# Patient Record
Sex: FEMALE | Race: WHITE | NOT HISPANIC OR LATINO
[De-identification: names, ages, dates, MRNs, and addresses within clinical notes are randomized per-mention and may not be internally consistent; named-entity substitution may affect disease eponyms.]

---
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
## 2018-01-08 LAB — SURGICAL PATHOLOGY STUDY: SIGNIFICANT CHANGE UP

## 2018-01-19 ENCOUNTER — APPOINTMENT (OUTPATIENT)
Dept: DERMATOLOGY | Facility: CLINIC | Age: 18
End: 2018-01-19

## 2018-01-26 ENCOUNTER — APPOINTMENT (OUTPATIENT)
Dept: PLASTIC SURGERY | Facility: CLINIC | Age: 18
End: 2018-01-26
Payer: COMMERCIAL

## 2018-01-26 PROCEDURE — 99024 POSTOP FOLLOW-UP VISIT: CPT

## 2018-01-29 ENCOUNTER — TRANSCRIPTION ENCOUNTER (OUTPATIENT)
Age: 18
End: 2018-01-29

## 2018-02-09 ENCOUNTER — APPOINTMENT (OUTPATIENT)
Dept: PLASTIC SURGERY | Facility: CLINIC | Age: 18
End: 2018-02-09
Payer: COMMERCIAL

## 2018-02-09 ENCOUNTER — RESULT REVIEW (OUTPATIENT)
Age: 18
End: 2018-02-09

## 2018-02-09 PROCEDURE — 13101 CMPLX RPR TRUNK 2.6-7.5 CM: CPT | Mod: 78,59

## 2018-02-09 PROCEDURE — 21935 RESECT BACK TUM < 5 CM: CPT | Mod: 78

## 2018-02-13 ENCOUNTER — OUTPATIENT (OUTPATIENT)
Dept: OUTPATIENT SERVICES | Facility: HOSPITAL | Age: 18
LOS: 1 days | End: 2018-02-13
Payer: COMMERCIAL

## 2018-02-13 DIAGNOSIS — Z78.9 OTHER SPECIFIED HEALTH STATUS: Chronic | ICD-10-CM

## 2018-02-13 DIAGNOSIS — D36.10 BENIGN NEOPLASM OF PERIPHERAL NERVES AND AUTONOMIC NERVOUS SYSTEM, UNSPECIFIED: ICD-10-CM

## 2018-02-13 PROCEDURE — 88341 IMHCHEM/IMCYTCHM EA ADD ANTB: CPT

## 2018-02-13 PROCEDURE — 88305 TISSUE EXAM BY PATHOLOGIST: CPT

## 2018-02-16 ENCOUNTER — APPOINTMENT (OUTPATIENT)
Dept: PLASTIC SURGERY | Facility: CLINIC | Age: 18
End: 2018-02-16
Payer: COMMERCIAL

## 2018-02-16 PROCEDURE — 99024 POSTOP FOLLOW-UP VISIT: CPT

## 2018-02-21 LAB
SURGICAL PATHOLOGY STUDY: SIGNIFICANT CHANGE UP
SURGICAL PATHOLOGY STUDY: SIGNIFICANT CHANGE UP

## 2018-03-16 ENCOUNTER — APPOINTMENT (OUTPATIENT)
Dept: PLASTIC SURGERY | Facility: CLINIC | Age: 18
End: 2018-03-16
Payer: COMMERCIAL

## 2018-03-16 PROCEDURE — 99213 OFFICE O/P EST LOW 20 MIN: CPT | Mod: 24

## 2018-09-07 ENCOUNTER — APPOINTMENT (OUTPATIENT)
Dept: DERMATOLOGY | Facility: CLINIC | Age: 18
End: 2018-09-07
Payer: COMMERCIAL

## 2018-09-07 VITALS — WEIGHT: 118 LBS | DIASTOLIC BLOOD PRESSURE: 66 MMHG | SYSTOLIC BLOOD PRESSURE: 104 MMHG

## 2018-09-07 DIAGNOSIS — Z92.3 PERSONAL HISTORY OF IRRADIATION: ICD-10-CM

## 2018-09-07 PROCEDURE — 99214 OFFICE O/P EST MOD 30 MIN: CPT | Mod: GC

## 2018-09-17 ENCOUNTER — APPOINTMENT (OUTPATIENT)
Dept: MRI IMAGING | Facility: IMAGING CENTER | Age: 18
End: 2018-09-17
Payer: COMMERCIAL

## 2018-09-17 ENCOUNTER — OUTPATIENT (OUTPATIENT)
Dept: OUTPATIENT SERVICES | Facility: HOSPITAL | Age: 18
LOS: 1 days | End: 2018-09-17
Payer: COMMERCIAL

## 2018-09-17 DIAGNOSIS — Z78.9 OTHER SPECIFIED HEALTH STATUS: Chronic | ICD-10-CM

## 2018-09-17 DIAGNOSIS — C72.30 MALIGNANT NEOPLASM OF UNSPECIFIED OPTIC NERVE: ICD-10-CM

## 2018-09-17 PROCEDURE — 70553 MRI BRAIN STEM W/O & W/DYE: CPT

## 2018-09-17 PROCEDURE — 70543 MRI ORBT/FAC/NCK W/O &W/DYE: CPT

## 2018-09-17 PROCEDURE — 70543 MRI ORBT/FAC/NCK W/O &W/DYE: CPT | Mod: 26

## 2018-09-17 PROCEDURE — 82565 ASSAY OF CREATININE: CPT

## 2018-09-17 PROCEDURE — 70553 MRI BRAIN STEM W/O & W/DYE: CPT | Mod: 26

## 2018-09-17 PROCEDURE — A9585: CPT

## 2018-09-25 ENCOUNTER — OUTPATIENT (OUTPATIENT)
Dept: OUTPATIENT SERVICES | Facility: HOSPITAL | Age: 18
LOS: 1 days | End: 2018-09-25
Payer: COMMERCIAL

## 2018-09-25 ENCOUNTER — APPOINTMENT (OUTPATIENT)
Dept: MRI IMAGING | Facility: IMAGING CENTER | Age: 18
End: 2018-09-25
Payer: COMMERCIAL

## 2018-09-25 DIAGNOSIS — Q85.01 NEUROFIBROMATOSIS, TYPE 1: ICD-10-CM

## 2018-09-25 DIAGNOSIS — Z78.9 OTHER SPECIFIED HEALTH STATUS: Chronic | ICD-10-CM

## 2018-09-25 PROCEDURE — 74183 MRI ABD W/O CNTR FLWD CNTR: CPT

## 2018-09-25 PROCEDURE — 82565 ASSAY OF CREATININE: CPT

## 2018-09-25 PROCEDURE — 72197 MRI PELVIS W/O & W/DYE: CPT

## 2018-09-25 PROCEDURE — 74183 MRI ABD W/O CNTR FLWD CNTR: CPT | Mod: 26

## 2018-09-25 PROCEDURE — 72197 MRI PELVIS W/O & W/DYE: CPT | Mod: 26

## 2018-09-25 PROCEDURE — A9585: CPT

## 2018-09-26 ENCOUNTER — APPOINTMENT (OUTPATIENT)
Dept: PEDIATRIC NEUROLOGY | Facility: CLINIC | Age: 18
End: 2018-09-26
Payer: COMMERCIAL

## 2018-09-26 VITALS — SYSTOLIC BLOOD PRESSURE: 89 MMHG | DIASTOLIC BLOOD PRESSURE: 62 MMHG | HEART RATE: 84 BPM | WEIGHT: 114 LBS

## 2018-09-26 DIAGNOSIS — Z86.39 PERSONAL HISTORY OF OTHER ENDOCRINE, NUTRITIONAL AND METABOLIC DISEASE: ICD-10-CM

## 2018-09-26 PROCEDURE — 99214 OFFICE O/P EST MOD 30 MIN: CPT

## 2018-10-01 ENCOUNTER — RESULT REVIEW (OUTPATIENT)
Age: 18
End: 2018-10-01

## 2018-10-03 ENCOUNTER — RESULT REVIEW (OUTPATIENT)
Age: 18
End: 2018-10-03

## 2018-11-09 ENCOUNTER — APPOINTMENT (OUTPATIENT)
Dept: MRI IMAGING | Facility: IMAGING CENTER | Age: 18
End: 2018-11-09
Payer: COMMERCIAL

## 2018-11-09 ENCOUNTER — OUTPATIENT (OUTPATIENT)
Dept: OUTPATIENT SERVICES | Facility: HOSPITAL | Age: 18
LOS: 1 days | End: 2018-11-09
Payer: COMMERCIAL

## 2018-11-09 DIAGNOSIS — Z78.9 OTHER SPECIFIED HEALTH STATUS: Chronic | ICD-10-CM

## 2018-11-09 DIAGNOSIS — Q85.00 NEUROFIBROMATOSIS, UNSPECIFIED: ICD-10-CM

## 2018-11-09 PROCEDURE — A9585: CPT

## 2018-11-09 PROCEDURE — 72157 MRI CHEST SPINE W/O & W/DYE: CPT | Mod: 26

## 2018-11-09 PROCEDURE — 82565 ASSAY OF CREATININE: CPT

## 2018-11-09 PROCEDURE — 72158 MRI LUMBAR SPINE W/O & W/DYE: CPT | Mod: 26

## 2018-11-09 PROCEDURE — 72157 MRI CHEST SPINE W/O & W/DYE: CPT

## 2018-11-09 PROCEDURE — 72158 MRI LUMBAR SPINE W/O & W/DYE: CPT

## 2018-11-20 ENCOUNTER — APPOINTMENT (OUTPATIENT)
Dept: PEDIATRIC HEMATOLOGY/ONCOLOGY | Facility: CLINIC | Age: 18
End: 2018-11-20

## 2018-12-03 ENCOUNTER — OUTPATIENT (OUTPATIENT)
Dept: OUTPATIENT SERVICES | Age: 18
LOS: 1 days | End: 2018-12-03

## 2018-12-03 ENCOUNTER — APPOINTMENT (OUTPATIENT)
Dept: MRI IMAGING | Facility: HOSPITAL | Age: 18
End: 2018-12-03
Payer: COMMERCIAL

## 2018-12-03 DIAGNOSIS — Z78.9 OTHER SPECIFIED HEALTH STATUS: Chronic | ICD-10-CM

## 2018-12-03 DIAGNOSIS — D36.10 BENIGN NEOPLASM OF PERIPHERAL NERVES AND AUTONOMIC NERVOUS SYSTEM, UNSPECIFIED: ICD-10-CM

## 2018-12-03 PROCEDURE — 72157 MRI CHEST SPINE W/O & W/DYE: CPT | Mod: 26

## 2018-12-07 ENCOUNTER — APPOINTMENT (OUTPATIENT)
Dept: DERMATOLOGY | Facility: CLINIC | Age: 18
End: 2018-12-07
Payer: COMMERCIAL

## 2018-12-07 VITALS
BODY MASS INDEX: 19.88 KG/M2 | SYSTOLIC BLOOD PRESSURE: 109 MMHG | DIASTOLIC BLOOD PRESSURE: 71 MMHG | WEIGHT: 108 LBS | HEIGHT: 62 IN

## 2018-12-07 PROCEDURE — 99213 OFFICE O/P EST LOW 20 MIN: CPT | Mod: GC

## 2018-12-17 ENCOUNTER — MOBILE ON CALL (OUTPATIENT)
Age: 18
End: 2018-12-17

## 2018-12-28 ENCOUNTER — APPOINTMENT (OUTPATIENT)
Dept: HEPATOLOGY | Facility: CLINIC | Age: 18
End: 2018-12-28
Payer: COMMERCIAL

## 2018-12-28 VITALS
HEIGHT: 62 IN | HEART RATE: 55 BPM | DIASTOLIC BLOOD PRESSURE: 42 MMHG | OXYGEN SATURATION: 97 % | RESPIRATION RATE: 14 BRPM | SYSTOLIC BLOOD PRESSURE: 96 MMHG | BODY MASS INDEX: 19.88 KG/M2 | WEIGHT: 108 LBS

## 2018-12-28 PROCEDURE — 99204 OFFICE O/P NEW MOD 45 MIN: CPT

## 2019-01-02 LAB
AFP-TM SERPL-MCNC: 1.8 NG/ML
ALBUMIN SERPL ELPH-MCNC: 5 G/DL
ALP BLD-CCNC: 112 U/L
ALT SERPL-CCNC: 23 U/L
ANION GAP SERPL CALC-SCNC: 14 MMOL/L
AST SERPL-CCNC: 21 U/L
BASOPHILS # BLD AUTO: 0.02 K/UL
BASOPHILS NFR BLD AUTO: 0.3 %
BILIRUB SERPL-MCNC: 0.8 MG/DL
BUN SERPL-MCNC: 23 MG/DL
CALCIUM SERPL-MCNC: 9.7 MG/DL
CANCER AG19-9 SERPL-ACNC: 8.2 U/ML
CERULOPLASMIN SERPL-MCNC: 22 MG/DL
CHLORIDE SERPL-SCNC: 104 MMOL/L
CO2 SERPL-SCNC: 23 MMOL/L
CREAT SERPL-MCNC: 0.95 MG/DL
DEPRECATED KAPPA LC FREE/LAMBDA SER: 1.25 RATIO
EOSINOPHIL # BLD AUTO: 0.03 K/UL
EOSINOPHIL NFR BLD AUTO: 0.5 %
FERRITIN SERPL-MCNC: 92 NG/ML
GLUCOSE SERPL-MCNC: 80 MG/DL
HBV SURFACE AB SER QL: NONREACTIVE
HBV SURFACE AG SER QL: NONREACTIVE
HCT VFR BLD CALC: 40.8 %
HCV AB SER QL: NONREACTIVE
HCV S/CO RATIO: 0.17 S/CO
HEPATITIS A IGG ANTIBODY: REACTIVE
HGB BLD-MCNC: 13.8 G/DL
IGA SER QL IEP: 252 MG/DL
IGG SER QL IEP: 1169 MG/DL
IGM SER QL IEP: 56 MG/DL
IMM GRANULOCYTES NFR BLD AUTO: 0.2 %
INR PPP: 1.13 RATIO
IRON SATN MFR SERPL: 37 %
IRON SERPL-MCNC: 106 UG/DL
KAPPA LC CSF-MCNC: 1.58 MG/DL
KAPPA LC SERPL-MCNC: 1.98 MG/DL
LYMPHOCYTES # BLD AUTO: 1.49 K/UL
LYMPHOCYTES NFR BLD AUTO: 24.8 %
MAN DIFF?: NORMAL
MCHC RBC-ENTMCNC: 32.2 PG
MCHC RBC-ENTMCNC: 33.8 GM/DL
MCV RBC AUTO: 95.3 FL
MONOCYTES # BLD AUTO: 0.45 K/UL
MONOCYTES NFR BLD AUTO: 7.5 %
NEUTROPHILS # BLD AUTO: 4 K/UL
NEUTROPHILS NFR BLD AUTO: 66.7 %
PLATELET # BLD AUTO: 263 K/UL
POTASSIUM SERPL-SCNC: 4.2 MMOL/L
PROT SERPL-MCNC: 7.7 G/DL
PT BLD: 12.7 SEC
RBC # BLD: 4.28 M/UL
RBC # FLD: 12.6 %
SMOOTH MUSCLE AB SER QL IF: NORMAL
SODIUM SERPL-SCNC: 141 MMOL/L
T4 FREE SERPL-MCNC: 1.7 NG/DL
TIBC SERPL-MCNC: 285 UG/DL
TSH SERPL-ACNC: 1.35 UIU/ML
UIBC SERPL-MCNC: 179 UG/DL
WBC # FLD AUTO: 6 K/UL

## 2019-01-03 LAB
A1AT PHENOTYP SERPL-IMP: NORMAL BANDS
A1AT SERPL-MCNC: 118 MG/DL
ANA PAT FLD IF-IMP: ABNORMAL
ANA SER IF-ACNC: ABNORMAL

## 2019-01-10 ENCOUNTER — MOBILE ON CALL (OUTPATIENT)
Age: 19
End: 2019-01-10

## 2019-01-22 ENCOUNTER — FORM ENCOUNTER (OUTPATIENT)
Age: 19
End: 2019-01-22

## 2019-01-23 ENCOUNTER — OUTPATIENT (OUTPATIENT)
Dept: OUTPATIENT SERVICES | Facility: HOSPITAL | Age: 19
LOS: 1 days | End: 2019-01-23
Payer: COMMERCIAL

## 2019-01-23 ENCOUNTER — APPOINTMENT (OUTPATIENT)
Dept: MRI IMAGING | Facility: IMAGING CENTER | Age: 19
End: 2019-01-23
Payer: COMMERCIAL

## 2019-01-23 DIAGNOSIS — Z78.9 OTHER SPECIFIED HEALTH STATUS: Chronic | ICD-10-CM

## 2019-01-23 DIAGNOSIS — K76.9 LIVER DISEASE, UNSPECIFIED: ICD-10-CM

## 2019-01-23 PROCEDURE — 74183 MRI ABD W/O CNTR FLWD CNTR: CPT

## 2019-01-23 PROCEDURE — 74183 MRI ABD W/O CNTR FLWD CNTR: CPT | Mod: 26

## 2019-02-08 ENCOUNTER — OTHER (OUTPATIENT)
Age: 19
End: 2019-02-08

## 2019-02-11 ENCOUNTER — OTHER (OUTPATIENT)
Age: 19
End: 2019-02-11

## 2019-02-12 ENCOUNTER — OTHER (OUTPATIENT)
Age: 19
End: 2019-02-12

## 2019-02-21 ENCOUNTER — APPOINTMENT (OUTPATIENT)
Dept: INTERVENTIONAL RADIOLOGY/VASCULAR | Facility: CLINIC | Age: 19
End: 2019-02-21

## 2019-02-21 ENCOUNTER — FORM ENCOUNTER (OUTPATIENT)
Age: 19
End: 2019-02-21

## 2019-02-22 ENCOUNTER — OUTPATIENT (OUTPATIENT)
Dept: OUTPATIENT SERVICES | Facility: HOSPITAL | Age: 19
LOS: 1 days | End: 2019-02-22
Payer: COMMERCIAL

## 2019-02-22 ENCOUNTER — APPOINTMENT (OUTPATIENT)
Dept: INTERVENTIONAL RADIOLOGY/VASCULAR | Facility: CLINIC | Age: 19
End: 2019-02-22
Payer: COMMERCIAL

## 2019-02-22 ENCOUNTER — APPOINTMENT (OUTPATIENT)
Dept: ULTRASOUND IMAGING | Facility: CLINIC | Age: 19
End: 2019-02-22
Payer: COMMERCIAL

## 2019-02-22 VITALS
HEIGHT: 62 IN | WEIGHT: 108 LBS | OXYGEN SATURATION: 99 % | RESPIRATION RATE: 16 BRPM | SYSTOLIC BLOOD PRESSURE: 96 MMHG | BODY MASS INDEX: 19.88 KG/M2 | HEART RATE: 97 BPM | TEMPERATURE: 98 F | DIASTOLIC BLOOD PRESSURE: 64 MMHG

## 2019-02-22 DIAGNOSIS — Z78.9 OTHER SPECIFIED HEALTH STATUS: Chronic | ICD-10-CM

## 2019-02-22 DIAGNOSIS — C47.9 MALIGNANT NEOPLASM OF PERIPHERAL NERVES AND AUTONOMIC NERVOUS SYSTEM, UNSPECIFIED: ICD-10-CM

## 2019-02-22 DIAGNOSIS — K76.9 LIVER DISEASE, UNSPECIFIED: ICD-10-CM

## 2019-02-22 DIAGNOSIS — Q85.01 NEUROFIBROMATOSIS, TYPE 1: ICD-10-CM

## 2019-02-22 DIAGNOSIS — Z78.9 OTHER SPECIFIED HEALTH STATUS: ICD-10-CM

## 2019-02-22 PROCEDURE — 76705 ECHO EXAM OF ABDOMEN: CPT | Mod: 26

## 2019-02-22 PROCEDURE — 76705 ECHO EXAM OF ABDOMEN: CPT

## 2019-02-22 PROCEDURE — 99243 OFF/OP CNSLTJ NEW/EST LOW 30: CPT

## 2019-02-22 RX ORDER — CHROMIUM 200 MCG
1000 TABLET ORAL
Refills: 0 | Status: DISCONTINUED | COMMUNITY
End: 2019-02-22

## 2019-02-22 RX ORDER — FEXOFENADINE HCL 60 MG
CAPSULE ORAL
Refills: 0 | Status: DISCONTINUED | COMMUNITY
End: 2019-02-22

## 2019-02-26 LAB
ALBUMIN SERPL ELPH-MCNC: 4.6 G/DL
ALP BLD-CCNC: 104 U/L
ALT SERPL-CCNC: 18 U/L
ANION GAP SERPL CALC-SCNC: 13 MMOL/L
AST SERPL-CCNC: 21 U/L
BASOPHILS # BLD AUTO: 0.05 K/UL
BASOPHILS NFR BLD AUTO: 0.5 %
BILIRUB SERPL-MCNC: 0.4 MG/DL
BUN SERPL-MCNC: 23 MG/DL
CALCIUM SERPL-MCNC: 9.4 MG/DL
CHLORIDE SERPL-SCNC: 105 MMOL/L
CO2 SERPL-SCNC: 23 MMOL/L
CREAT SERPL-MCNC: 0.96 MG/DL
EOSINOPHIL # BLD AUTO: 0.06 K/UL
EOSINOPHIL NFR BLD AUTO: 0.6 %
GLUCOSE SERPL-MCNC: 74 MG/DL
HCT VFR BLD CALC: 39.6 %
HGB BLD-MCNC: 13.2 G/DL
IMM GRANULOCYTES NFR BLD AUTO: 0.3 %
INR PPP: 1.15 RATIO
LYMPHOCYTES # BLD AUTO: 1.32 K/UL
LYMPHOCYTES NFR BLD AUTO: 13.4 %
MAN DIFF?: NORMAL
MCHC RBC-ENTMCNC: 32.7 PG
MCHC RBC-ENTMCNC: 33.3 GM/DL
MCV RBC AUTO: 98 FL
MONOCYTES # BLD AUTO: 0.9 K/UL
MONOCYTES NFR BLD AUTO: 9.2 %
NEUTROPHILS # BLD AUTO: 7.46 K/UL
NEUTROPHILS NFR BLD AUTO: 76 %
PLATELET # BLD AUTO: 251 K/UL
POTASSIUM SERPL-SCNC: 3.9 MMOL/L
PROT SERPL-MCNC: 7.7 G/DL
PT BLD: 13 SEC
RBC # BLD: 4.04 M/UL
RBC # FLD: 12.1 %
SODIUM SERPL-SCNC: 141 MMOL/L
WBC # FLD AUTO: 9.82 K/UL

## 2019-02-28 PROBLEM — Z78.9 DOES NOT USE ILLICIT DRUGS: Status: ACTIVE | Noted: 2019-02-28

## 2019-03-01 ENCOUNTER — TRANSCRIPTION ENCOUNTER (OUTPATIENT)
Age: 19
End: 2019-03-01

## 2019-03-01 NOTE — ASSESSMENT
[Other: _____] : [unfilled] [FreeTextEntry1] : Ms. 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 Her MRI from 1/23/19 demonstrates liver with normal morphology. There are 3 focal lesions including a 1.8 cm T1 hypointense, T2 mildly hyperintense lesion in the caudate lobe, which demonstrates arterial enhancement, equilibrates to background liver on delayed phase imaging, with suggestion of internal fat, a 1.0 cm T1 hypointense, T2 mildly hyperintense lesion in segment 5/6 demonstrates arterial enhancement, venous washout, and equilibrates to background liver on the late phase imaging, and a left hepatic dome lesion measures 1.2 x 1, previously 1.6 x 1.3. \par \par I reviewed her imaging and discussed the findings with Ms. Leung and her mother.  There are 2 indeterminate lesions that I could biopsy.  I would like to have her get an u/s of the liver to determine if the more superficial lesion is accessible to biopsy.  I have given her a rx and arranged for her to have the u/s performed today at a nearby imaging facility.  I discussed targeted liver biopsy procedure, including the risks, benefits, alternatives, and expected post procedure course.  I have given her a rx for lab work to be done today too.  She will have a T+S on the day of the procedure. \par \par I have provided the patient the opportunity to ask questions and have answered them to their satisfaction.  They are encouraged to contact our office with any further questions, concerns, or issues.  I will be in touch with her after I review her ultrasound. \par

## 2019-03-01 NOTE — HISTORY OF PRESENT ILLNESS
[FreeTextEntry1] : This is a 20 y/o female with pmhx of neurofibromatosis type 1 with multiple neurofibromas who presents today for consultation for liver biopsy.  She was diagnosed with NF1 at age 1 and was followed closely with serial imaging.  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  She has continued to follow with the pediatric team at [facility] and was referred to hepatology after MRI in 9/2018 demonstrated multiple hepatic lesions.  \par \par She was evaluated by Dr. Lucas and follow up MRI demonstrated 2 lesions of indeterminate nature. Her case was discussed at 2/4/19 Tumor Board and given her prior history of malignancy,  it was recommended she have a biopsy of the right lobe lesion.  She presents today to IR to discuss targeted liver biopsy. Reports unintentional weight loss of 18 lbs since September, despite having a good appetite.  Denies abdominal pain, fever, chills, change in skin color, or melena.

## 2019-03-01 NOTE — REVIEW OF SYSTEMS
[Recent Weight Loss (___ Lbs)] : recent [unfilled] ~Ulb weight loss [Negative] : Heme/Lymph [As Noted in HPI] : as noted in HPI [Fever] : no fever [Chills] : no chills [Feeling Poorly] : not feeling poorly [Feeling Tired] : not feeling tired

## 2019-03-01 NOTE — PHYSICAL EXAM
[Alert] : alert [No Acute Distress] : no acute distress [Well Developed] : well developed [Normal Voice/Communication] : normal voice communication [Normal Sclera/Conjunctiva] : normal sclera/conjunctiva [No Respiratory Distress] : no respiratory distress [Normal Rate and Effort] : normal respiratory rhythm and effort [No Accessory Muscle Use] : no accessory muscle use [Normal Gait] : normal gait [Oriented x3] : oriented to person, place, and time [Normal Insight/Judgement] : insight and judgment were intact [Normal Affect] : the affect was normal [Normal Mood] : the mood was normal [Recent Memory Normal] : recent memory was not impaired [Remote Memory Normal] : remote memory was not impaired [Restricted in physically strenuous activity but ambulatory and able to carry out work of a light or sedentary nature] : Restricted in physically strenuous activity but ambulatory and able to carry out work of a light or sedentary nature, e.g., light house work, office work [de-identified] : thin

## 2019-03-01 NOTE — DATA REVIEWED
[FreeTextEntry1] : \par \par EXAM: MR ABDOMEN WAW IC \par \par \par PROCEDURE DATE: 01/23/2019 \par \par \par \par INTERPRETATION: CLINICAL INFORMATION: Follow-up indeterminate hepatic \par lesions. 18-year-old female with neurofibromatosis type I. \par \par COMPARISON: MR abdomen 9/25/2018, 4/27/2016. \par \par PROCEDURE: \par MRI of the abdomen was performed with and without intravenous contrast. \par 10 cc of ProHance administered, 0 cc discarded. \par MRCP was performed. \par \par \par FINDINGS: \par \par LOWER CHEST: Within normal limits. \par \par LIVER: Normal morphology. \par \par 3 focal lesions identified as follows: \par \par A 1.8 cm T1 hypointense, T2 mildly hyperintense lesion in the caudate lobe \par (10:18) demonstrates arterial enhancement, equilibrates to background liver \par on delayed phase imaging, with suggestion of internal fat. This is not \par significantly changed from 9/25/2018. \par \par A 1.0 cm T1 hypointense, T2 mildly hyperintense lesion in segment 5/6 \par (10:23) demonstrates arterial enhancement, venous washout, and equilibrates \par to background liver on the late phase imaging. This is not significantly \par changed from 9/25/2018. \par \par Previously noted lesion in the left hepatic dome is less conspicuous than \par the other lesions but is slightly T2 hyperintense. This measures 1.2 x 1.1 \par cm (4, 8), compared with prior measurement of 1.6 x 1.3. \par \par BILE DUCTS: Normal caliber. \par GALLBLADDER: Within normal limits. \par SPLEEN: Within normal limits. \par PANCREAS: Within normal limits. \par ADRENALS: Right adrenalectomy. The left adrenal gland is within normal \par limits. \par KIDNEYS/URETERS: Right nephrectomy. The left kidney is within normal limits. \par \par VISUALIZED PORTIONS: \par \par BOWEL: No bowel obstruction. \par PERITONEUM: No ascites. \par VESSELS: Within normal limits. \par RETROPERITONEUM: Unchanged multiple neurofibromas as follows: \par A 3.5 x 3.0 cm left para-aortic neurofibroma (16:47). \par A 2.2 x 2.0 cm neurofibroma associated with the left paraspinal muscle \par (16:52). \par A 1.3 x 1.1 cm left perineural neurofibroma adjacent to the left psoas \par muscle (16:50). \par A right perineural neurofibroma measuring 2.5 x 1.1 cm (16:11) . \par \par ABDOMINAL WALL: Several neurofibromas associated with the right lateral \par abdominal wall and on the skin are also unchanged. \par \par \par IMPRESSION: \par Unchanged hepatic lesions as above, favoring adenomas. Follow-up MRI in 6 \par months is recommended. \par \par Unchanged multiple neurofibromas. \par \par \par \par \par \par \par KEHINDE SANTIAGO M.D., RADIOLOGY FELLOW \par This document has been electronically signed. \par GREYSON RICKS M.D., ATTENDING RADIOLOGIST

## 2019-03-01 NOTE — CONSULT LETTER
[Dear  ___] : Dear  [unfilled], [Courtesy Letter:] : I had the pleasure of seeing your patient, [unfilled], in my office today. [Please see my note below.] : Please see my note below. [Sincerely,] : Sincerely, [FreeTextEntry3] : Basil Skinner MD

## 2019-03-04 ENCOUNTER — APPOINTMENT (OUTPATIENT)
Dept: MRI IMAGING | Facility: HOSPITAL | Age: 19
End: 2019-03-04
Payer: COMMERCIAL

## 2019-03-07 ENCOUNTER — APPOINTMENT (OUTPATIENT)
Dept: MRI IMAGING | Facility: HOSPITAL | Age: 19
End: 2019-03-07
Payer: COMMERCIAL

## 2019-03-07 ENCOUNTER — OUTPATIENT (OUTPATIENT)
Dept: OUTPATIENT SERVICES | Age: 19
LOS: 1 days | End: 2019-03-07

## 2019-03-07 DIAGNOSIS — Z78.9 OTHER SPECIFIED HEALTH STATUS: Chronic | ICD-10-CM

## 2019-03-07 DIAGNOSIS — Q85.00 NEUROFIBROMATOSIS, UNSPECIFIED: ICD-10-CM

## 2019-03-08 PROCEDURE — 72157 MRI CHEST SPINE W/O & W/DYE: CPT | Mod: 26

## 2019-03-13 ENCOUNTER — OUTPATIENT (OUTPATIENT)
Dept: OUTPATIENT SERVICES | Facility: HOSPITAL | Age: 19
LOS: 1 days | End: 2019-03-13
Payer: COMMERCIAL

## 2019-03-13 ENCOUNTER — RESULT REVIEW (OUTPATIENT)
Age: 19
End: 2019-03-13

## 2019-03-13 ENCOUNTER — APPOINTMENT (OUTPATIENT)
Dept: CT IMAGING | Facility: HOSPITAL | Age: 19
End: 2019-03-13

## 2019-03-13 VITALS
HEART RATE: 88 BPM | WEIGHT: 108.03 LBS | TEMPERATURE: 98 F | HEIGHT: 62 IN | SYSTOLIC BLOOD PRESSURE: 118 MMHG | OXYGEN SATURATION: 100 % | RESPIRATION RATE: 14 BRPM | DIASTOLIC BLOOD PRESSURE: 76 MMHG

## 2019-03-13 VITALS — OXYGEN SATURATION: 100 %

## 2019-03-13 DIAGNOSIS — Z78.9 OTHER SPECIFIED HEALTH STATUS: Chronic | ICD-10-CM

## 2019-03-13 DIAGNOSIS — C47.9 MALIGNANT NEOPLASM OF PERIPHERAL NERVES AND AUTONOMIC NERVOUS SYSTEM, UNSPECIFIED: ICD-10-CM

## 2019-03-13 LAB
BLD GP AB SCN SERPL QL: NEGATIVE — SIGNIFICANT CHANGE UP
RH IG SCN BLD-IMP: POSITIVE — SIGNIFICANT CHANGE UP
RH IG SCN BLD-IMP: POSITIVE — SIGNIFICANT CHANGE UP

## 2019-03-13 PROCEDURE — 88313 SPECIAL STAINS GROUP 2: CPT | Mod: 26

## 2019-03-13 PROCEDURE — 77012 CT SCAN FOR NEEDLE BIOPSY: CPT | Mod: 26

## 2019-03-13 PROCEDURE — 47000 NEEDLE BIOPSY OF LIVER PERQ: CPT

## 2019-03-13 PROCEDURE — 88305 TISSUE EXAM BY PATHOLOGIST: CPT

## 2019-03-13 PROCEDURE — 86901 BLOOD TYPING SEROLOGIC RH(D): CPT

## 2019-03-13 PROCEDURE — 88313 SPECIAL STAINS GROUP 2: CPT

## 2019-03-13 PROCEDURE — 88173 CYTOPATH EVAL FNA REPORT: CPT

## 2019-03-13 PROCEDURE — 88173 CYTOPATH EVAL FNA REPORT: CPT | Mod: 26

## 2019-03-13 PROCEDURE — 88307 TISSUE EXAM BY PATHOLOGIST: CPT

## 2019-03-13 PROCEDURE — 88307 TISSUE EXAM BY PATHOLOGIST: CPT | Mod: 26

## 2019-03-13 PROCEDURE — 88305 TISSUE EXAM BY PATHOLOGIST: CPT | Mod: 26

## 2019-03-13 PROCEDURE — 88172 CYTP DX EVAL FNA 1ST EA SITE: CPT

## 2019-03-13 PROCEDURE — 86900 BLOOD TYPING SEROLOGIC ABO: CPT

## 2019-03-13 PROCEDURE — 86850 RBC ANTIBODY SCREEN: CPT

## 2019-03-13 PROCEDURE — 77012 CT SCAN FOR NEEDLE BIOPSY: CPT

## 2019-03-13 NOTE — PRE-ANESTHESIA EVALUATION ADULT - NSANTHOSAYNRD_GEN_A_CORE
No. GENIA screening performed.  STOP BANG Legend: 0-2 = LOW Risk; 3-4 = INTERMEDIATE Risk; 5-8 = HIGH Risk
No. GENIA screening performed.  STOP BANG Legend: 0-2 = LOW Risk; 3-4 = INTERMEDIATE Risk; 5-8 = HIGH Risk

## 2019-03-15 ENCOUNTER — EMERGENCY (EMERGENCY)
Age: 19
LOS: 1 days | Discharge: HOME | End: 2019-03-15
Attending: PEDIATRICS | Admitting: STUDENT IN AN ORGANIZED HEALTH CARE EDUCATION/TRAINING PROGRAM
Payer: COMMERCIAL

## 2019-03-15 ENCOUNTER — APPOINTMENT (OUTPATIENT)
Dept: DERMATOLOGY | Facility: CLINIC | Age: 19
End: 2019-03-15
Payer: COMMERCIAL

## 2019-03-15 VITALS
RESPIRATION RATE: 16 BRPM | OXYGEN SATURATION: 100 % | SYSTOLIC BLOOD PRESSURE: 104 MMHG | HEART RATE: 89 BPM | TEMPERATURE: 98 F | DIASTOLIC BLOOD PRESSURE: 57 MMHG

## 2019-03-15 VITALS — DIASTOLIC BLOOD PRESSURE: 62 MMHG | SYSTOLIC BLOOD PRESSURE: 96 MMHG | WEIGHT: 106 LBS

## 2019-03-15 DIAGNOSIS — D22.9 MELANOCYTIC NEVI, UNSPECIFIED: ICD-10-CM

## 2019-03-15 DIAGNOSIS — R51 HEADACHE: ICD-10-CM

## 2019-03-15 DIAGNOSIS — Z78.9 OTHER SPECIFIED HEALTH STATUS: Chronic | ICD-10-CM

## 2019-03-15 LAB
ALBUMIN SERPL ELPH-MCNC: 4.5 G/DL — SIGNIFICANT CHANGE UP (ref 3.3–5)
ALP SERPL-CCNC: 105 U/L — SIGNIFICANT CHANGE UP (ref 40–120)
ALT FLD-CCNC: 21 U/L — SIGNIFICANT CHANGE UP (ref 4–33)
ANION GAP SERPL CALC-SCNC: 14 MMO/L — SIGNIFICANT CHANGE UP (ref 7–14)
ANISOCYTOSIS BLD QL: SLIGHT — SIGNIFICANT CHANGE UP
AST SERPL-CCNC: 22 U/L — SIGNIFICANT CHANGE UP (ref 4–32)
BASE EXCESS BLDV CALC-SCNC: 2.5 MMOL/L — SIGNIFICANT CHANGE UP
BASOPHILS # BLD AUTO: 0.02 K/UL — SIGNIFICANT CHANGE UP (ref 0–0.2)
BASOPHILS NFR BLD AUTO: 0.4 % — SIGNIFICANT CHANGE UP (ref 0–2)
BASOPHILS NFR SPEC: 0.9 % — SIGNIFICANT CHANGE UP (ref 0–2)
BILIRUB SERPL-MCNC: 0.5 MG/DL — SIGNIFICANT CHANGE UP (ref 0.2–1.2)
BLASTS # FLD: 0 % — SIGNIFICANT CHANGE UP (ref 0–0)
BLOOD GAS VENOUS - CREATININE: 0.95 MG/DL — SIGNIFICANT CHANGE UP (ref 0.5–1.3)
BUN SERPL-MCNC: 18 MG/DL — SIGNIFICANT CHANGE UP (ref 7–23)
CALCIUM SERPL-MCNC: 9.7 MG/DL — SIGNIFICANT CHANGE UP (ref 8.4–10.5)
CHLORIDE BLDV-SCNC: 107 MMOL/L — SIGNIFICANT CHANGE UP (ref 96–108)
CHLORIDE SERPL-SCNC: 104 MMOL/L — SIGNIFICANT CHANGE UP (ref 98–107)
CO2 SERPL-SCNC: 20 MMOL/L — LOW (ref 22–31)
CREAT SERPL-MCNC: 0.95 MG/DL — SIGNIFICANT CHANGE UP (ref 0.5–1.3)
EOSINOPHIL # BLD AUTO: 0.02 K/UL — SIGNIFICANT CHANGE UP (ref 0–0.5)
EOSINOPHIL NFR BLD AUTO: 0.4 % — SIGNIFICANT CHANGE UP (ref 0–6)
EOSINOPHIL NFR FLD: 0 % — SIGNIFICANT CHANGE UP (ref 0–6)
GAS PNL BLDV: 130 MMOL/L — LOW (ref 136–146)
GLUCOSE BLDV-MCNC: 85 — SIGNIFICANT CHANGE UP (ref 70–99)
GLUCOSE SERPL-MCNC: 78 MG/DL — SIGNIFICANT CHANGE UP (ref 70–99)
HCO3 BLDV-SCNC: 25 MMOL/L — SIGNIFICANT CHANGE UP (ref 20–27)
HCT VFR BLD CALC: 41.4 % — SIGNIFICANT CHANGE UP (ref 34.5–45)
HCT VFR BLDV CALC: 43.3 % — SIGNIFICANT CHANGE UP (ref 34.5–45)
HGB BLD-MCNC: 13.5 G/DL — SIGNIFICANT CHANGE UP (ref 11.5–15.5)
HGB BLDV-MCNC: 14.1 G/DL — SIGNIFICANT CHANGE UP (ref 11.5–15.5)
IMM GRANULOCYTES NFR BLD AUTO: 0.4 % — SIGNIFICANT CHANGE UP (ref 0–1.5)
LACTATE BLDV-MCNC: 1.3 MMOL/L — SIGNIFICANT CHANGE UP (ref 0.5–2)
LYMPHOCYTES # BLD AUTO: 0.37 K/UL — LOW (ref 1–3.3)
LYMPHOCYTES # BLD AUTO: 7 % — LOW (ref 13–44)
LYMPHOCYTES NFR SPEC AUTO: 2.6 % — LOW (ref 13–44)
MACROCYTES BLD QL: SLIGHT — SIGNIFICANT CHANGE UP
MCHC RBC-ENTMCNC: 32.3 PG — SIGNIFICANT CHANGE UP (ref 27–34)
MCHC RBC-ENTMCNC: 32.6 % — SIGNIFICANT CHANGE UP (ref 32–36)
MCV RBC AUTO: 99 FL — SIGNIFICANT CHANGE UP (ref 80–100)
METAMYELOCYTES # FLD: 0 % — SIGNIFICANT CHANGE UP (ref 0–1)
MONOCYTES # BLD AUTO: 0.73 K/UL — SIGNIFICANT CHANGE UP (ref 0–0.9)
MONOCYTES NFR BLD AUTO: 13.7 % — SIGNIFICANT CHANGE UP (ref 2–14)
MONOCYTES NFR BLD: 4.3 % — SIGNIFICANT CHANGE UP (ref 2–9)
MYELOCYTES NFR BLD: 0 % — SIGNIFICANT CHANGE UP (ref 0–0)
NEUTROPHIL AB SER-ACNC: 90.5 % — HIGH (ref 43–77)
NEUTROPHILS # BLD AUTO: 4.15 K/UL — SIGNIFICANT CHANGE UP (ref 1.8–7.4)
NEUTROPHILS NFR BLD AUTO: 78.1 % — HIGH (ref 43–77)
NEUTS BAND # BLD: 0 % — SIGNIFICANT CHANGE UP (ref 0–6)
NRBC # FLD: 0 K/UL — LOW (ref 25–125)
OTHER - HEMATOLOGY %: 0 — SIGNIFICANT CHANGE UP
PCO2 BLDV: 51 MMHG — SIGNIFICANT CHANGE UP (ref 41–51)
PH BLDV: 7.35 PH — SIGNIFICANT CHANGE UP (ref 7.32–7.43)
PLATELET # BLD AUTO: 172 K/UL — SIGNIFICANT CHANGE UP (ref 150–400)
PLATELET COUNT - ESTIMATE: NORMAL — SIGNIFICANT CHANGE UP
PMV BLD: 10.2 FL — SIGNIFICANT CHANGE UP (ref 7–13)
PO2 BLDV: 26 MMHG — LOW (ref 35–40)
POTASSIUM SERPL-MCNC: 4.4 MMOL/L — SIGNIFICANT CHANGE UP (ref 3.5–5.3)
POTASSIUM SERPL-SCNC: 4.4 MMOL/L — SIGNIFICANT CHANGE UP (ref 3.5–5.3)
PROMYELOCYTES # FLD: 0 % — SIGNIFICANT CHANGE UP (ref 0–0)
PROT SERPL-MCNC: 7.2 G/DL — SIGNIFICANT CHANGE UP (ref 6–8.3)
RBC # BLD: 4.18 M/UL — SIGNIFICANT CHANGE UP (ref 3.8–5.2)
RBC # FLD: 11.6 % — SIGNIFICANT CHANGE UP (ref 10.3–14.5)
SAO2 % BLDV: 44.6 % — LOW (ref 60–85)
SODIUM SERPL-SCNC: 138 MMOL/L — SIGNIFICANT CHANGE UP (ref 135–145)
VARIANT LYMPHS # BLD: 1.7 % — SIGNIFICANT CHANGE UP
WBC # BLD: 5.31 K/UL — SIGNIFICANT CHANGE UP (ref 3.8–10.5)
WBC # FLD AUTO: 5.31 K/UL — SIGNIFICANT CHANGE UP (ref 3.8–10.5)

## 2019-03-15 PROCEDURE — 93010 ELECTROCARDIOGRAM REPORT: CPT

## 2019-03-15 PROCEDURE — 70450 CT HEAD/BRAIN W/O DYE: CPT | Mod: 26

## 2019-03-15 PROCEDURE — 71046 X-RAY EXAM CHEST 2 VIEWS: CPT | Mod: 26

## 2019-03-15 PROCEDURE — 99214 OFFICE O/P EST MOD 30 MIN: CPT

## 2019-03-15 PROCEDURE — 99285 EMERGENCY DEPT VISIT HI MDM: CPT

## 2019-03-15 PROCEDURE — 82962 GLUCOSE BLOOD TEST: CPT

## 2019-03-15 RX ORDER — SODIUM CHLORIDE 9 MG/ML
1000 INJECTION INTRAMUSCULAR; INTRAVENOUS; SUBCUTANEOUS ONCE
Qty: 0 | Refills: 0 | Status: COMPLETED | OUTPATIENT
Start: 2019-03-15 | End: 2019-03-15

## 2019-03-15 RX ORDER — ONDANSETRON 8 MG/1
4 TABLET, FILM COATED ORAL ONCE
Qty: 0 | Refills: 0 | Status: COMPLETED | OUTPATIENT
Start: 2019-03-15 | End: 2019-03-15

## 2019-03-15 RX ORDER — SODIUM CHLORIDE 9 MG/ML
1000 INJECTION, SOLUTION INTRAVENOUS
Qty: 0 | Refills: 0 | Status: DISCONTINUED | OUTPATIENT
Start: 2019-03-15 | End: 2019-03-16

## 2019-03-15 RX ADMIN — ONDANSETRON 4 MILLIGRAM(S): 8 TABLET, FILM COATED ORAL at 20:56

## 2019-03-15 RX ADMIN — SODIUM CHLORIDE 2000 MILLILITER(S): 9 INJECTION INTRAMUSCULAR; INTRAVENOUS; SUBCUTANEOUS at 19:30

## 2019-03-15 RX ADMIN — SODIUM CHLORIDE 90 MILLILITER(S): 9 INJECTION, SOLUTION INTRAVENOUS at 23:27

## 2019-03-15 RX ADMIN — SODIUM CHLORIDE 2000 MILLILITER(S): 9 INJECTION INTRAMUSCULAR; INTRAVENOUS; SUBCUTANEOUS at 18:30

## 2019-03-15 NOTE — ED PROVIDER NOTE - PROGRESS NOTE DETAILS
Charles Grullon MD: Labs reassurting, no anemia, nml lytes. CXR ___. EKG with ?flattened Ts in some leads however discussed with cards and they have low susp for cardiac syncope and will f/u as OP. Will also f/u with neuro. Return precautions discussed at length - to return to the ED for persistent or worsening signs and symptoms, will follow up with pediatrician in 1 day. Return precautions discussed at length - to return to the ED for persistent or worsening signs and symptoms, will follow up with pediatrician in 1 day. Cardiology called ; ask to call [phone] for cardiology f/u. Dr. Guillen called. [phone] Neurology on board. Not likely seizure. Can be admitted for possible EEG. Dr. Guillen called. [phone]. Mother states that pt had near syncope episode while taking Xray. Pt is to be given IVF x 2L and reassess. Charles Grullon MD: Labs reassuring, no anemia, nml lytes. D stick 67. CXR nml EKG with ?flattened Ts in some leads however discussed with cards and they have no susp for cardiac syncope and will f/u as OP if improves after IVF. Repeat D stick now. Will also f/u with neuro who we spoke with, no concern for primary seizure, likely convulsive syncope. She Remains well-appearing, VSS with Normal cardiopulmonary exam/normal work of breathing, well-perfused. Normal neuro exam. States she got dizzy when she stood up, IVF starting now. Charles Grullon MD: Labs reassuring, no anemia, nml lytes incl normal creat. D stick 67. CXR nml EKG with ?flattened Ts in some leads however discussed with cards and they have no susp for cardiac syncope and will f/u as OP if improves after IVF. Repeat D stick now. Will also f/u with neuro who we spoke with, no concern for primary seizure, likely convulsive syncope. She Remains well-appearing, VSS with Normal cardiopulmonary exam/normal work of breathing, well-perfused. Normal neuro exam. States she got dizzy when she stood up, IVF starting now. Spoke to neurology who did not want to admit to their service. Did not feel episodes seemed like seizures. Did not feel headaches fit migraine picture. - Vern Ramirez PGY3 Spoke to heme onc. Stated they will not admit to their service. Usually do not admit NF. - Vern Ramirez PGY3 Spoke to NSx. Recommended head CT. No changes. No further recommendations. - Vern Ramirez PGY3 pt received 2 NS boluses after labs normal (bicarb 20). had 2 episodes of vomiting and feeling lightheaded. pt unable to sit up because of lightheadedness. pt also reports right sided HA, worse when lying down and also reports that her lightheadedness has not improved with fluids. discussed with NS, head ct negative for bleed, stable from previous MRI. discussed with neuro resident and h/o resident as well as hospitalist who stated that pt should be admitted to adult service. discussed with MAR (Dr. Lyudmila Polk) and hospitalist in charge (Dr. Anderson). pt accepted and will be admitted to telemetry. pmd (Dr. Weldon) aware of admission. Iraj Garcia MD Attending

## 2019-03-15 NOTE — ED PEDIATRIC NURSE NOTE - NSIMPLEMENTINTERV_GEN_ALL_ED
Implemented All Fall Risk Interventions:  [place] to call system. Call bell, personal items and telephone within reach. Instruct patient to call for assistance. Room bathroom lighting operational. Non-slip footwear when patient is off stretcher. Physically safe environment: no spills, clutter or unnecessary equipment. Stretcher in lowest position, wheels locked, appropriate side rails in place. Provide visual cue, wrist band, yellow gown, etc. Monitor gait and stability. Monitor for mental status changes and reorient to person, place, and time. Review medications for side effects contributing to fall risk. Reinforce activity limits and safety measures with patient and family.

## 2019-03-15 NOTE — CONSULT NOTE PEDS - ASSESSMENT
20yo female pmhx of NF type 1, suboccipital peripheral nerve sheath tumor excision by Dr. Griffith 10/2014, presents to ED with syncopal episode.

## 2019-03-15 NOTE — ED PROVIDER NOTE - OBJECTIVE STATEMENT
19F presenting with syncope today. Pt has hx of NF type 1, peripheral nerve sheath tumor in right abdomen, s/p right nephrectomy and adrenalectomy, optic gliomas, not on any medications currently. Pt was at the dermatology office today, standing for about 2 mins while being examined, had a syncope episode, with whole body shaking 15 seconds, witnessed by mother and doctor. Pt's FS was high 80s, but found to be hypotensive to 88/60. Pt has Liver Bx done 2d ago, IR directed. Pt has RLQ pain but chronic. Denies any new pain since the procedure. Pt endorsed to lightheadedness with standing up. 19F presenting with syncope today. Pt has hx of NF type 1, peripheral nerve sheath tumor in right abdomen, s/p right nephrectomy and adrenalectomy, optic gliomas, not on any medications currently. Pt was at the dermatology office today, standing for about 2 mins while being examined, had a syncope episode, with whole body shaking 15 seconds, witnessed by mother and doctor. Pt's FS was high 80s, but found to be hypotensive to 88/60. Pt has Liver Bx done 2d ago, IR directed. Pt has RLQ pain but chronic. Denies any new pain since the procedure. Pt endorsed to lightheadedness with standing up.    No social concerns, lives with parents and no exposure to second hand smoke. Nno family history of disease or relevant past medical/surgical history other than documented in chart.

## 2019-03-15 NOTE — ED PROVIDER NOTE - PSH
Central venous catheter in place  2010, out 2011  Other Adrenal Hypofunction  s/p adrenalectomy right 2010  S/P Nephrectomy  right 2010

## 2019-03-15 NOTE — ED PROVIDER NOTE - PHYSICAL EXAMINATION
Charles Grullon MD: VERY WELL-APPEARING, WELL-HYDRATED NORMAL CARDIOPULMONARY EXAM. +axillary frecking, WELL-PERFUSED. NO HEPATOSPLENOMEGALY - NORMAL WORK OF BREATHING W CLEAR LUNGS - Benign abd with clean biopsy scar. No bleeding. Awake, alert, and oriented.  Normal ocular exam incl PERRLA, EOMI w sharp discs. Cranial nerves 2-12 intact.  5/5 strength in all muscle groups.  2+ patellar reflexes bilaterally.  Cerebellar function intact by finger-to-nose testing.  Sensation grossly intact.  Negative Rhomberg sign.  Normal gait.

## 2019-03-15 NOTE — ED CLERICAL - NS ED CLERK NOTE PRE-ARRIVAL INFORMATION; ADDITIONAL PRE-ARRIVAL INFORMATION
19Y F hx of neurofibromatosis type 1, followed by peds neuro,heme onc. Biopsy of liver done by IR few days ago. Upon standing pt felt light headed, dizzy, facial twitching without syncope. Couldn't remember not postictal, remained hypotensive at office.

## 2019-03-15 NOTE — ED PROVIDER NOTE - NSFOLLOWUPINSTRUCTIONS_ED_ALL_ED_FT
Return precautions discussed at length - to return to the ED for persistent or worsening signs and symptoms, will follow up with pediatrician in 1 day.     Syncope in Children    WHAT YOU NEED TO KNOW:    Syncope is also called fainting or passing out. Syncope is a sudden, temporary loss of consciousness, followed by a fall from a standing or sitting position. Syncope is usually not a serious problem, and children usually recover quickly after an episode. Syncope can sometimes be a sign of a medical condition that needs to be treated.    DISCHARGE INSTRUCTIONS:    Call 911 for any of the following:     Your child loses consciousness and does not wake up.    Your child has chest pain and trouble breathing.    Return to the emergency department if:     Your child has a seizure.    Your child faints, hits his or her head, and is bleeding.    Your child faints when he or she exercises.    Your child faints more than once.     Contact your child's healthcare provider if:     Your child has a headache, a fast heartbeat, or feels too dizzy to stand up.    You have questions or concerns about your child's condition or care.    Follow up with your child's healthcare provider as directed: Write down your questions so you remember to ask them during your child's visits.    Manage your child's syncope:     Keep a record of your child's syncope episodes. Include your child's symptoms and his or her activity before and after the episode. The record can help your child's healthcare provider find the cause of his or her syncope and help manage episodes.    Tell your child to sit or lie down when needed. This includes when your child feels dizzy, his or her throat is getting tight, and vision changes.    Teach your child to take slow, deep breaths if he or she starts to breathe faster with anxiety or fear. This can help decrease dizziness and the feeling that he or she might faint.     Prevent your child's syncope episodes:     Tell your child to move slowly and get used to one position before he or she moves to another position. This is very important when your child changes from a lying or sitting position to a standing position. Have your child take some deep breaths before he or she stands up from a lying position. Your child needs to stand up slowly. Sudden movements may cause a fainting spell. Have your child sit on the side of the bed or couch for a few minutes before he or she stands up. If your child is on bedrest, try to help him or her be upright for about 2 hours each day, or as directed. Your child should not lock his or her legs when standing for a long period of time. Leg movement including bending the knees will keep blood flowing.    Follow your healthcare provider's recommendations. Your provider may recommend that your child drink more liquids to prevent dehydration. Your child may also need to have more salt to keep his or her blood pressure from dropping too low and causing syncope. Your child's provider will tell you how much liquid and sodium your child should have each day. The provider will also tell you how much physical activity is safe for your child. He or she may not be able to play certain sports or do some activities. This will depend on what is causing your child's syncope.    Avoid triggers. Learn what causes syncope in your child and work with him or her to avoid them.     Watch for signs of low blood sugar. These include hunger, nervousness, sweating, and fast or fluttery heartbeats. Talk with your child's healthcare provider about ways to keep your child's blood sugar level steady.    Be careful in hot weather. Heat can cause a syncope episode. Limit your child's outdoor activity on hot days. Physical activity in hot weather can lead to dehydration. This can cause an episode. - Follow up with Cardiology in 2 days.   - Follow up with Neurology in 2 days.   - Return precautions discussed at length - to return to the ED for persistent or worsening signs and symptoms, will follow up with pediatrician in 1 day.     Syncope in Children    WHAT YOU NEED TO KNOW:    Syncope is also called fainting or passing out. Syncope is a sudden, temporary loss of consciousness, followed by a fall from a standing or sitting position. Syncope is usually not a serious problem, and children usually recover quickly after an episode. Syncope can sometimes be a sign of a medical condition that needs to be treated.    DISCHARGE INSTRUCTIONS:    Call 911 for any of the following:     Your child loses consciousness and does not wake up.    Your child has chest pain and trouble breathing.    Return to the emergency department if:     Your child has a seizure.    Your child faints, hits his or her head, and is bleeding.    Your child faints when he or she exercises.    Your child faints more than once.     Contact your child's healthcare provider if:     Your child has a headache, a fast heartbeat, or feels too dizzy to stand up.    You have questions or concerns about your child's condition or care.    Follow up with your child's healthcare provider as directed: Write down your questions so you remember to ask them during your child's visits.    Manage your child's syncope:     Keep a record of your child's syncope episodes. Include your child's symptoms and his or her activity before and after the episode. The record can help your child's healthcare provider find the cause of his or her syncope and help manage episodes.    Tell your child to sit or lie down when needed. This includes when your child feels dizzy, his or her throat is getting tight, and vision changes.    Teach your child to take slow, deep breaths if he or she starts to breathe faster with anxiety or fear. This can help decrease dizziness and the feeling that he or she might faint.     Prevent your child's syncope episodes:     Tell your child to move slowly and get used to one position before he or she moves to another position. This is very important when your child changes from a lying or sitting position to a standing position. Have your child take some deep breaths before he or she stands up from a lying position. Your child needs to stand up slowly. Sudden movements may cause a fainting spell. Have your child sit on the side of the bed or couch for a few minutes before he or she stands up. If your child is on bedrest, try to help him or her be upright for about 2 hours each day, or as directed. Your child should not lock his or her legs when standing for a long period of time. Leg movement including bending the knees will keep blood flowing.    Follow your healthcare provider's recommendations. Your provider may recommend that your child drink more liquids to prevent dehydration. Your child may also need to have more salt to keep his or her blood pressure from dropping too low and causing syncope. Your child's provider will tell you how much liquid and sodium your child should have each day. The provider will also tell you how much physical activity is safe for your child. He or she may not be able to play certain sports or do some activities. This will depend on what is causing your child's syncope.    Avoid triggers. Learn what causes syncope in your child and work with him or her to avoid them.     Watch for signs of low blood sugar. These include hunger, nervousness, sweating, and fast or fluttery heartbeats. Talk with your child's healthcare provider about ways to keep your child's blood sugar level steady.    Be careful in hot weather. Heat can cause a syncope episode. Limit your child's outdoor activity on hot days. Physical activity in hot weather can lead to dehydration. This can cause an episode.

## 2019-03-15 NOTE — ED PROVIDER NOTE - NSFOLLOWUPCLINICS_GEN_ALL_ED_FT
[facility]  Cardiology  [phone] [place] [address]  Phone: [phone]  Fax: [phone]    Pediatric Neurology  Pediatric Neurology  [address] [place] [address]  Phone: [phone]  Fax: [phone]  Follow Up Time:

## 2019-03-15 NOTE — CHART NOTE - NSCHARTNOTEFT_GEN_A_CORE
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 1 at dermatology office upon standing. Returned to baseline right away. Had another episode witnessed by ER. Nonfocal neuro exam per ER. At baseline, but c/o "feeling like going to past out." No prior history of seizure activity. Also last MRI brain Sept. 2018- left optic glioma and NF1 stigmata, but no other intracranial pathology.    Neurology consulted due to concern for seizure activity. Based on history, low suspicion for seizure activity at this time. Agree to cardiology workup. Prior abdominal imaging showing paraaortic neurofibroma that has been increasing in size (last imaging Jan. 2019). Current symptoms likely not related to lesion, but to consider re-imaging abdomen if symptoms persist. 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 1 at dermatology office upon standing. Returned to baseline right away. Had another episode witnessed by ER. Nonfocal neuro exam per ER. At baseline, but c/o "feeling like going to past out." No prior history of seizure activity. Also last MRI brain Sept. 2018- left optic glioma and NF1 stigmata, but no other intracranial pathology.    Neurology consulted due to concern for seizure activity. Based on history, low suspicion for seizure activity at this time. Agree to cardiology workup, fluids. If admit, rec. admission under hospitalist. Prior abdominal imaging showing paraaortic neurofibroma that has been increasing in size (last imaging Jan. 2019). Current symptoms likely not related to lesion, but to consider re-imaging abdomen if symptoms persist. 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 1 at dermatology office upon standing. Returned to baseline right away. Had another episode witnessed by ER. Nonfocal neuro exam per ER. At baseline, but c/o "feeling like going to pass out." No prior history of seizure activity. Also last MRI brain Sept. 2018- left optic glioma and NF1 stigmata, but no other intracranial pathology.    Neurology consulted due to concern for seizure activity. Based on history, low suspicion for seizure activity at this time. Agree to cardiology workup, fluids. If admit, rec. admission under hospitalist. Prior abdominal imaging showing paraaortic neurofibroma that has been increasing in size (last imaging Jan. 2019). Current symptoms likely not related to lesion, but to consider re-imaging abdomen if symptoms persist.

## 2019-03-15 NOTE — CONSULT NOTE PEDS - SUBJECTIVE AND OBJECTIVE BOX
NEUROSURGERY NOTE  JAYLIN PARADA / 03-15-19 @ 22:55    HPI: 19y Female pmhx of NF type 1, suboccipital peripheral nerve sheath tumor excision by Dr. Griffith 10/2014, peripheral nerve sheath tumor in right abdomen, s/p right nephrectomy and adrenalectomy, optic gliomas, not on any medications currently. Pt was at the dermatology office today, standing for about 2 mins while being examined, had a syncope episode, with whole body shaking 15 seconds, witnessed by mother and doctor. Pt's FS was high 80s, but found to be hypotensive to 88/60. Pt has Liver Bx done 2d ago, IR directed for neurofibroma on liver. Pt has RLQ pain but chronic. Denies any new pain since the procedure. Pt endorsed to lightheadedness with standing up. Pt saw Dr. Griffith for regular f/u visit on monday with no complaints. Pt has neurofibromas on the spine.     As pt was ready to be discharged, she experienced two episodes of vomiting and a 5/10 headache.  NSG consutled. CTH ordered.     PHYSICAL EXAM:     Vital Signs Last 24 Hrs  T(C): 37.4 (15 Mar 2019 22:14), Max: 37.4 (15 Mar 2019 22:14)  T(F): 99.3 (15 Mar 2019 22:14), Max: 99.3 (15 Mar 2019 22:14)  HR: 83 (15 Mar 2019 22:14) (83 - 89)  BP: 103/62 (15 Mar 2019 22:14) (95/62 - 105/68)  BP(mean): --  RR: 16 (15 Mar 2019 22:14) (16 - 19)  SpO2: 100% (15 Mar 2019 22:14) (100% - 100%)  Current Weight Gm     Awake, alert, appropriate   PERRL B/L, EOMI  Moves all extremities x4  Sensation intact   No drift   Reflexes WNL

## 2019-03-15 NOTE — ED PEDIATRIC NURSE REASSESSMENT NOTE - GENERAL PATIENT STATE
comfortable appearance/smiling/interactive/family/SO at bedside
comfortable appearance/family/SO at bedside/smiling/interactive
family/SO at bedside/comfortable appearance

## 2019-03-15 NOTE — ED PROVIDER NOTE - PMH
Adrenal Cortex Neoplasm    Cafe-au-lait spots    Nerve sheath tumor    Neurofibromatosis, Type 1    Optic glioma    Solitary kidney  s/p nephrectomy

## 2019-03-15 NOTE — ED PROVIDER NOTE - CLINICAL SUMMARY MEDICAL DECISION MAKING FREE TEXT BOX
NF1 liver bx 2d ago for nodules in liver, +vaccinated F here with ?syncope. NO prodromal symptoms. Full body shaking right after she fell x 15 sec, no post-ictal period. No head trauma. No eye deviation, tongue biting, bowel & bladder incontinence and regained MS immediately after event. No family history of seizure.  Asymptomatic from cardiac standpoint incl no SOB/CP/palpitations and no family history of sudden cardiac death. Well-isabela here, VSS with benign cardiopulmonary and neurologic exam. No indication for head imaging at this time. ? anemia from bleeding 2/2 bx vs vasovagal syncope (however no prodrome) vs seizure vs cardiac, low suspicion for cardiac syncope, intracranial pathology or seizure. Will obtain D stick, labs EKG, orthostatics, reassess. NF1 liver bx 2d ago for nodules in liver, +vaccinated F here with ?syncope. Few prodromal symptoms, she looked pale, mother asked if she was okay, she said no and then fell to ground. Full body shaking right after she fell x 15 sec, no post-ictal period. immediately regained MS. No head trauma. No eye deviation, tongue biting, bowel & bladder incontinence and regained MS immediately after event. No family history of seizure.  Asymptomatic from cardiac standpoint incl no SOB/CP/palpitations and no family history of sudden cardiac death. Well-isabela here, VSS without pallor w benign cardiopulmonary and neurologic exam. Benign abd. Clean surg sites. No indication for head imaging at this time. A.p: ? anemia from bleeding 2/2 bx vs vasovagal syncope (however no prodrome) vs seizure vs cardiac, Will obtain D stick, labs EKG, orthostatics, reassess. NF1 liver bx 2d ago for nodules in liver, +vaccinated F here with ?syncope. Also w peripheral nerve sheath tumor in right abdomen, s/p right nephrectomy (nml kidney function) and adrenalectomy, optic gliomas. No cardiac hx. No medicines. Few prodromal symptoms, she looked pale, mother asked if she was okay, she said no and then fell to ground. Full body shaking right after she fell x 15 sec, no post-ictal period. No head trauma. No eye deviation, tongue biting, bowel & bladder incontinence and regained MS immediately after event. No family history of seizure.  Asymptomatic from cardiac standpoint incl no SOB/CP/palpitations and no family history of sudden cardiac death. PE: Well-isabela here, VSS without pallor. Atraumatic scalp. Benign cardiopulmonary and neurologic exam. Benign abd. Clean surg site R flank. No indication for head imaging at this time. A/p: Likely VVagal however will r/u? anemia from bleeding 2/2 bx vs seizure vs cardiac syncope, Will obtain D stick, labs EKG, orthostatics, reassess.

## 2019-03-15 NOTE — ED PROVIDER NOTE - ATTENDING CONTRIBUTION TO CARE

## 2019-03-15 NOTE — ED PEDIATRIC NURSE REASSESSMENT NOTE - COMFORT CARE
repositioned/side rails up/po fluids offered/wait time explained/plan of care explained
wait time explained/plan of care explained/repositioned
plan of care explained/po fluids offered/repositioned/wait time explained

## 2019-03-16 VITALS
RESPIRATION RATE: 15 BRPM | SYSTOLIC BLOOD PRESSURE: 102 MMHG | DIASTOLIC BLOOD PRESSURE: 62 MMHG | TEMPERATURE: 99 F | OXYGEN SATURATION: 100 % | HEART RATE: 73 BPM

## 2019-03-16 DIAGNOSIS — R55 SYNCOPE AND COLLAPSE: ICD-10-CM

## 2019-03-16 RX ADMIN — SODIUM CHLORIDE 90 MILLILITER(S): 9 INJECTION, SOLUTION INTRAVENOUS at 04:34

## 2019-03-16 NOTE — ED PEDIATRIC NURSE REASSESSMENT NOTE - NS ED NURSE REASSESS COMMENT FT2
Patient A&Ox3. Respirations even and unlabored. Cap refill less than 2 seconds. + Pulses. Patient with emesis x2 despite Zofran. Patient sitting at a 90 degree angle in bed and states "I feel like I'm going to throw up when I lay down." Dr. Garcia notified and at bedside with patient. Awaiting further orders. Will continue to monitor and assess.
Pt brought to adult ED on cardiac monitor with parent and all belongings. Report given to Shobha COLON.
Patient A&Ox3. Respirations even and unlabored. Cap refill less than 2 seconds. + Pulses. Skin warm, dry and pink.  Patient with emesis x 1 again. MD notified. No further orders at this time. Will continue to monitor and assess.
Dr. Garcia notified. No further orders at this time. Will continue to monitor and assess.

## 2019-03-16 NOTE — ED ADULT NURSE REASSESSMENT NOTE - NS ED NURSE REASSESS COMMENT FT1
Received pt from pediatric RN, pt A&Ox4, respirations even and unlabored b/l. Pt with no complaints. Denies any pain/discomfort. NAD. Will continue to monitor.

## 2019-03-16 NOTE — DISCHARGE NOTE PROVIDER - HOSPITAL COURSE
20 yo F was seen in ED and admitted for further syncope w/u but wants to leave the hospital. Called by nurse to evaluate patient who wishes to leave the hospital against medical advice. Patient is A&O x3, has full capacity to make her own medical decisions. Pt was informed of their medical conditions, benefits, and alternatives to treatment as well as the risks of refusing treatment and the seriousness of leaving against medical advice such as the risks of heart attack, stroke, seizure, and even death were fully explained to the patient.  After expressing full understanding, patient then signs out against medical advice witnessed by the nurse.  Attending made aware.

## 2019-03-16 NOTE — ED ADULT NURSE REASSESSMENT NOTE - NS ED NURSE REASSESS COMMENT FT1
Pt and mother at bedside reports dissatisfaction d/t staying in the ED overnight. Patient and mother requesting to sign out AMA. Patient appears NAD, at baseline ambulatory without assistance. Tele PA made aware - pt awaiting AMA form. Pt and mother at bedside reports dissatisfaction d/t staying in the ED overnight. Patient and mother requesting to sign out AMA. Patient appears NAD, at baseline ambulatory without assistance. Tele PA made aware - pt awaiting AMA form.    Pt signed out AMA, IV removed. Patient informed of risks by Tele PA. Patient left facility with signing form.

## 2019-03-18 ENCOUNTER — MOBILE ON CALL (OUTPATIENT)
Age: 19
End: 2019-03-18

## 2019-03-18 DIAGNOSIS — Q85.00 NEUROFIBROMATOSIS, UNSPECIFIED: ICD-10-CM

## 2019-03-18 DIAGNOSIS — D49.0 NEOPLASM OF UNSPECIFIED BEHAVIOR OF DIGESTIVE SYSTEM: ICD-10-CM

## 2019-03-18 LAB
GLUCOSE BLDC GLUCOMTR-MCNC: 87
NON-GYNECOLOGICAL CYTOLOGY STUDY: SIGNIFICANT CHANGE UP

## 2019-03-26 ENCOUNTER — OTHER (OUTPATIENT)
Age: 19
End: 2019-03-26

## 2019-04-15 ENCOUNTER — FORM ENCOUNTER (OUTPATIENT)
Age: 19
End: 2019-04-15

## 2019-04-16 ENCOUNTER — OUTPATIENT (OUTPATIENT)
Dept: OUTPATIENT SERVICES | Facility: HOSPITAL | Age: 19
LOS: 1 days | End: 2019-04-16
Payer: COMMERCIAL

## 2019-04-16 ENCOUNTER — APPOINTMENT (OUTPATIENT)
Dept: ULTRASOUND IMAGING | Facility: IMAGING CENTER | Age: 19
End: 2019-04-16
Payer: COMMERCIAL

## 2019-04-16 DIAGNOSIS — K76.9 LIVER DISEASE, UNSPECIFIED: ICD-10-CM

## 2019-04-16 DIAGNOSIS — Z78.9 OTHER SPECIFIED HEALTH STATUS: Chronic | ICD-10-CM

## 2019-04-16 PROCEDURE — 76705 ECHO EXAM OF ABDOMEN: CPT

## 2019-04-16 PROCEDURE — 76705 ECHO EXAM OF ABDOMEN: CPT | Mod: 26

## 2019-05-09 ENCOUNTER — FORM ENCOUNTER (OUTPATIENT)
Age: 19
End: 2019-05-09

## 2019-05-10 ENCOUNTER — APPOINTMENT (OUTPATIENT)
Dept: MRI IMAGING | Facility: IMAGING CENTER | Age: 19
End: 2019-05-10
Payer: COMMERCIAL

## 2019-05-10 ENCOUNTER — OUTPATIENT (OUTPATIENT)
Dept: OUTPATIENT SERVICES | Facility: HOSPITAL | Age: 19
LOS: 1 days | End: 2019-05-10
Payer: COMMERCIAL

## 2019-05-10 DIAGNOSIS — K76.9 LIVER DISEASE, UNSPECIFIED: ICD-10-CM

## 2019-05-10 DIAGNOSIS — Z78.9 OTHER SPECIFIED HEALTH STATUS: Chronic | ICD-10-CM

## 2019-05-10 PROCEDURE — 74183 MRI ABD W/O CNTR FLWD CNTR: CPT | Mod: 26

## 2019-05-10 PROCEDURE — 74183 MRI ABD W/O CNTR FLWD CNTR: CPT

## 2019-05-10 PROCEDURE — A9581: CPT

## 2019-05-10 PROCEDURE — 82565 ASSAY OF CREATININE: CPT

## 2019-07-08 ENCOUNTER — OUTPATIENT (OUTPATIENT)
Dept: OUTPATIENT SERVICES | Age: 19
LOS: 1 days | End: 2019-07-08

## 2019-07-08 ENCOUNTER — APPOINTMENT (OUTPATIENT)
Dept: MRI IMAGING | Facility: HOSPITAL | Age: 19
End: 2019-07-08
Payer: COMMERCIAL

## 2019-07-08 DIAGNOSIS — D49.7 NEOPLASM OF UNSPECIFIED BEHAVIOR OF ENDOCRINE GLANDS AND OTHER PARTS OF NERVOUS SYSTEM: ICD-10-CM

## 2019-07-08 DIAGNOSIS — Z78.9 OTHER SPECIFIED HEALTH STATUS: Chronic | ICD-10-CM

## 2019-07-08 PROCEDURE — 72157 MRI CHEST SPINE W/O & W/DYE: CPT | Mod: 26

## 2019-08-27 ENCOUNTER — APPOINTMENT (OUTPATIENT)
Dept: PEDIATRIC NEUROLOGY | Facility: CLINIC | Age: 19
End: 2019-08-27
Payer: COMMERCIAL

## 2019-08-27 VITALS
WEIGHT: 106 LBS | SYSTOLIC BLOOD PRESSURE: 96 MMHG | HEART RATE: 90 BPM | DIASTOLIC BLOOD PRESSURE: 61 MMHG | BODY MASS INDEX: 18.55 KG/M2 | HEIGHT: 63.39 IN

## 2019-08-27 DIAGNOSIS — K76.9 LIVER DISEASE, UNSPECIFIED: ICD-10-CM

## 2019-08-27 PROCEDURE — 99215 OFFICE O/P EST HI 40 MIN: CPT

## 2019-10-29 ENCOUNTER — APPOINTMENT (OUTPATIENT)
Dept: MRI IMAGING | Facility: HOSPITAL | Age: 19
End: 2019-10-29
Payer: COMMERCIAL

## 2019-10-29 ENCOUNTER — OUTPATIENT (OUTPATIENT)
Dept: OUTPATIENT SERVICES | Age: 19
LOS: 1 days | End: 2019-10-29

## 2019-10-29 DIAGNOSIS — Z78.9 OTHER SPECIFIED HEALTH STATUS: Chronic | ICD-10-CM

## 2019-10-29 DIAGNOSIS — D49.7 NEOPLASM OF UNSPECIFIED BEHAVIOR OF ENDOCRINE GLANDS AND OTHER PARTS OF NERVOUS SYSTEM: ICD-10-CM

## 2019-10-29 PROCEDURE — 72157 MRI CHEST SPINE W/O & W/DYE: CPT | Mod: 26

## 2019-10-29 PROCEDURE — 72158 MRI LUMBAR SPINE W/O & W/DYE: CPT | Mod: 26

## 2019-12-09 ENCOUNTER — FORM ENCOUNTER (OUTPATIENT)
Age: 19
End: 2019-12-09

## 2019-12-10 ENCOUNTER — APPOINTMENT (OUTPATIENT)
Dept: MRI IMAGING | Facility: IMAGING CENTER | Age: 19
End: 2019-12-10
Payer: COMMERCIAL

## 2019-12-10 ENCOUNTER — OUTPATIENT (OUTPATIENT)
Dept: OUTPATIENT SERVICES | Facility: HOSPITAL | Age: 19
LOS: 1 days | End: 2019-12-10
Payer: COMMERCIAL

## 2019-12-10 DIAGNOSIS — Z78.9 OTHER SPECIFIED HEALTH STATUS: Chronic | ICD-10-CM

## 2019-12-10 DIAGNOSIS — K76.9 LIVER DISEASE, UNSPECIFIED: ICD-10-CM

## 2019-12-10 PROCEDURE — 74183 MRI ABD W/O CNTR FLWD CNTR: CPT | Mod: 26

## 2019-12-10 PROCEDURE — A9581: CPT

## 2019-12-10 PROCEDURE — 74183 MRI ABD W/O CNTR FLWD CNTR: CPT

## 2020-01-09 ENCOUNTER — APPOINTMENT (OUTPATIENT)
Dept: PEDIATRIC SURGERY | Facility: CLINIC | Age: 20
End: 2020-01-09
Payer: COMMERCIAL

## 2020-01-09 VITALS
WEIGHT: 106.48 LBS | SYSTOLIC BLOOD PRESSURE: 81 MMHG | HEART RATE: 83 BPM | BODY MASS INDEX: 18.63 KG/M2 | HEIGHT: 63.39 IN | DIASTOLIC BLOOD PRESSURE: 44 MMHG

## 2020-01-09 PROCEDURE — 99242 OFF/OP CONSLTJ NEW/EST SF 20: CPT

## 2020-01-09 NOTE — REASON FOR VISIT
[Patient] : patient [Initial - Scheduled] : an initial, scheduled visit with concerns of [FreeTextEntry3] : NF 1\par Back pain\par Requested assistance from neurosurgery for resection of thoracic mass [FreeTextEntry4] :  DR. KAMALA RASMUSSEN, dr jude pascual

## 2020-01-09 NOTE — ASSESSMENT
[FreeTextEntry1] : Cate is a 19 year old girl with a neurofibromatosis 1. She is scheduled to have the neurofibroma on the right side of her back removed in February. I will assist Dr. Patel and Dr. Faust in the procedure. I discussed the possibility that she may need a chest tube after surgery due to the proximity of the procedure to the lungs. They understand and agree with the plan to repair.

## 2020-01-09 NOTE — CONSULT LETTER
[Dear  ___] : Dear  [unfilled], [FreeTextEntry2] :  DR. KAMALA RASMUSSEN\par  [address] [facility] [address]  [place],\[place]  [place] [address] [FreeTextEntry3] : Annemarie Cassidy MD, MSC, [facility]\par Associate Trauma Medical Director\par Division of Pediatric General, Thoracic and Endoscopic Surgery\par [facility]\par \par \par \par

## 2020-01-09 NOTE — HISTORY OF PRESENT ILLNESS
[FreeTextEntry1] : Jaylin Is a 19 year old female who presents here today to be evaluated for\par She has a history of NF1 and optic glioma. She has h/o malignant nerve sheath tumor (2011) for which she had a right adrenalectomy and a right nephrectomy, s/p treatment. She has been seen in the survivorship clinic. She also had surgery to remove a neurofibroma from the scalp. Follows with,  Dr Coleman, ophtho, seen Feb 2017; has Lisch nodules; F/U 1 year. Dr. Beck, Derm; last seen March 2019, nephro Feb 2017; F/U advised 6-12 months but not done,Last seen here 9/26/18.\par \par JAYLIN reports that the mass on the right side of the flank is bothering her and is painful, also pain lower back to the right of the midline. She reports she gets sharp pain at night in the regions, almost every night; she get some pain during the day but less often. She has been having pain for a year. It has gotten larger. She is on Gabapentin, which is not working. Neurosurgery plans on removing the neurofibroma in the right posterior thoracic spine region and would like to meet with Dr. Cassidy before the procedure, as he is scheduled to assist in the operation. \par

## 2020-01-09 NOTE — ADDENDUM
[FreeTextEntry1] : Documented by Emily Walls acting as a scribe for Dr. Cassidy on 01/09/2020 .\par \par All medical record entries made by the Scribe were at my, Dr. Cassidy, direction and personally dictated by me on 01/09/2020 . I have reviewed the chart and agree that the record accurately reflects my personal performance of the history, physical exam, assessment and plan. I have also personally directed, reviewed, and agree with the discharge instructions.

## 2020-01-14 ENCOUNTER — APPOINTMENT (OUTPATIENT)
Dept: PEDIATRIC HEMATOLOGY/ONCOLOGY | Facility: CLINIC | Age: 20
End: 2020-01-14
Payer: COMMERCIAL

## 2020-01-14 ENCOUNTER — APPOINTMENT (OUTPATIENT)
Dept: PEDIATRIC NEUROLOGY | Facility: CLINIC | Age: 20
End: 2020-01-14
Payer: COMMERCIAL

## 2020-01-14 VITALS
DIASTOLIC BLOOD PRESSURE: 62 MMHG | BODY MASS INDEX: 18.76 KG/M2 | SYSTOLIC BLOOD PRESSURE: 97 MMHG | HEIGHT: 63.39 IN | WEIGHT: 107.21 LBS | HEART RATE: 106 BPM

## 2020-01-14 VITALS
HEIGHT: 63.39 IN | WEIGHT: 107.21 LBS | HEART RATE: 106 BPM | SYSTOLIC BLOOD PRESSURE: 97 MMHG | DIASTOLIC BLOOD PRESSURE: 62 MMHG | BODY MASS INDEX: 18.76 KG/M2

## 2020-01-14 DIAGNOSIS — R22.9 LOCALIZED SWELLING, MASS AND LUMP, UNSPECIFIED: ICD-10-CM

## 2020-01-14 DIAGNOSIS — R81 GLYCOSURIA: ICD-10-CM

## 2020-01-14 DIAGNOSIS — R31.29 OTHER MICROSCOPIC HEMATURIA: ICD-10-CM

## 2020-01-14 DIAGNOSIS — R07.81 PLEURODYNIA: ICD-10-CM

## 2020-01-14 DIAGNOSIS — Z09 ENCOUNTER FOR FOLLOW-UP EXAMINATION AFTER COMPLETED TREATMENT FOR CONDITIONS OTHER THAN MALIGNANT NEOPLASM: ICD-10-CM

## 2020-01-14 PROCEDURE — 99215 OFFICE O/P EST HI 40 MIN: CPT

## 2020-01-14 PROCEDURE — 99214 OFFICE O/P EST MOD 30 MIN: CPT

## 2020-01-14 NOTE — REVIEW OF SYSTEMS
[Patient Intake Form Reviewed] : patient intake form reviewed [Easy Bleeding] : a tendency for easy bleeding [Easy Bruising] : a tendency for easy bruising [Normal] : Hematologic/Lymphatic

## 2020-01-16 RX ORDER — GABAPENTIN 300 MG/1
300 CAPSULE ORAL
Qty: 90 | Refills: 0 | Status: DISCONTINUED | COMMUNITY
Start: 2018-12-07

## 2020-01-21 PROBLEM — R07.81 RIB PAIN ON RIGHT SIDE: Status: RESOLVED | Noted: 2017-02-28 | Resolved: 2020-01-21

## 2020-01-21 PROBLEM — R22.9 SKIN NODULE: Status: RESOLVED | Noted: 2019-03-15 | Resolved: 2020-01-21

## 2020-01-21 NOTE — RESULTS/DATA
[FreeTextEntry1] : \par EXAM: MR ABDOMEN WAW IC \par \par \par PROCEDURE DATE: 12/10/2019 \par \par \par \par INTERPRETATION: CLINICAL INFORMATION: Neurofibromatosis. Hepatic lesions \par consistent with focal nodular hyperplasia. Follow-up. \par \par COMPARISON: MRI 5/10/2019, 1/23/2019 \par \par PROCEDURE: \par MRI of the abdomen was performed with and without intravenous contrast. \par IV Contrast: Eovist. 10 cc administered, 0 cc discarded. \par MRCP was performed. \par \par FINDINGS: \par \par LOWER CHEST: Several neurofibromas in the paraspinal region with interval \par growth of a lesion in the right lower hemithorax (6:8) 2.6 x 2.2 cm, \par previously 2.5 x 1.2 cm 1/23/2019. \par \par LIVER: Normal morphology. No steatosis. Three hepatic lesions demonstrating \par signal similar to background liver with arterial phase enhancement and \par uptake of hepatobiliary agent at hepatobiliary phase imaging consistent with \par focal nodular hyperplasia are without change. Lesions are as follows: \par \par Caudate lobe (16:13) disease 2.5 x 1.6 cm. \par \par Segment 6 (16:21) 1.2 x 1.1 cm. \par \par Left hepatic lobe lateral segment (16:7) 1.7 x 1.6 cm. \par \par BILE DUCTS: Normal caliber. \par GALLBLADDER: Within normal limits. \par SPLEEN: Within normal limits. \par PANCREAS: Within normal limits. \par ADRENALS: Within normal limits. \par KIDNEYS/URETERS: Right nephrectomy. \par \par VISUALIZED PORTIONS: \par \par BOWEL: Within normal limits. \par PERITONEUM: No ascites. \par VESSELS: Hepatic and portal veins are patent. \par RETROPERITONEUM/LYMPH NODES: Several retroperitoneal neurofibromas with the \par largest in the left para-aortic region (11:25) 7.3 x 4.5 cm, previously 6.7 \par x 3.9 cm at prior imaging 1/23/2019. \par ABDOMINAL WALL: Several neurofibromas along the right abdominal wall without \par significant change. \par BONES: Within normal limits. \par \par IMPRESSION: \par \par Three hepatic focal nodular hyperplasia without significant change. \par \par Neurofibromatosis with several neurofibromas demonstrating interval growth \par compared with prior imaging as above. \par \par \par \par LOU SCHULTZ M.D., ATTENDING RADIOLOGIST \par This document has been electronically signed. Dec 11 2019 8:40AM \par \par

## 2020-01-21 NOTE — CONSULT LETTER
[Please see my note below.] : Please see my note below. [Courtesy Letter:] : I had the pleasure of seeing your patient, [unfilled], in my office today. [Sincerely,] : Sincerely, [Consult Closing:] : Thank you very much for allowing me to participate in the care of this patient.  If you have any questions, please do not hesitate to contact me. [Dear  ___] : Dear ~DIANN, [FreeTextEntry3] : Wan [FreeTextEntry2] : Wan Griffith MD  \par Pediatric Neurosurgery  \par [address], [address]  \[place]  [address]\par

## 2020-01-21 NOTE — PHYSICAL EXAM
[PERRLA] : SUKHDEEP [No Dysmetria] : no dysmetria  [Motor Exam nomal] : motor exam normal [EOMI] : EOMI  [Normal] : affect appropriate [Normal gait] : normal gait  [No Ataxia on Tandem Gait] : no ataxia on tandem gait [100: Normal, no complaints, no evidence of disease.] : 100: Normal, no complaints, no evidence of disease. [de-identified] : tenderness along right lower back [de-identified] : Large healed post surgical scar on right abdomen  [de-identified] : many cafe au lait spots scattered diffusely

## 2020-01-21 NOTE — REASON FOR VISIT
[Follow-Up Visit] : a follow-up visit for [Patient] : patient [Mother] : mother [FreeTextEntry2] : NF1

## 2020-01-21 NOTE — HISTORY OF PRESENT ILLNESS
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  6 years ago she underwent right adrenalectomy and nephrectomy.  Her last visit at [facility] was a little over one year ago.  She follows with opthalmology, nephrology and cardiology regularly. [de-identified] : JAYLIN continues to complain of R lower back pain that is at worst 7/10, described as shooting and throbbing, and wakes her up every night, after which it takes her 1.5-2 hours to fall back asleep. During the day her pain is 4-5. It is not relieved by Gabapentin 300 mg q am and 600 mg QHS. She is scheduled to have surgery with Dr. Griffith, likely in February, to remove the plexiform neurofibroma that is amenable to surgery and  that may be causing her pain.\par No side effects from Neurontin\par She has no other complaint.

## 2020-01-27 NOTE — HISTORY OF PRESENT ILLNESS
[FreeTextEntry1] : JAYLIN has NF1. 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 She has been seen in the survivorship clinic for that. S/P surgery to remove a neurofibroma from the scalp.  \par Last fainted in March 2019\par Follows with:\par - Dr Coleman ophtho, seen Feb 2017; has Lisch nodules; F/U 1 year requested but not seen\par - Dr. Beck, Derm; last seen March 2019\par - nephro Feb 2017; F/U advised 6-12 months but not done\par - Dr. Lucas, adult hepatology; s/p liver Bx; June 2019, liver mass consistent with focal nodular hyperplasia (FNH)\par - Dr. Griffith NSx for intramedullary tumors at T 10-T11\par Last seen here 08/27/2019, she continued to have lower back pain and sharp pain at night. She was taking Neurontin prn. She was advised to titrate Neurontin dose up to 300 mg t.i.d. \par \par \par 01/14/2020 Interval history \par \par T-L SPINE MRI 10/29/19:\par 1. The intramedullary tumor at T10 and T11 levels is increased in size with the 2 previous examinations, as described. \par 2. The nonenhancing, nonexpansile lesion within the spinal cord at T7 level is unchanged. \par 3. The extraforaminal component of the nerve sheath tumor related to the right T10-T11 neural foramen is larger compared to previously compared with the 2 previous examinations. \par 4. The largest retroperitoneal nerve sheath tumor anterior to the left psoas muscle has increased in size. \par \par Seen by Dr. Griffith 11/21/19 and sx of the lesion was discussed (consult scanned in EMR). \par \par ABDOMINAL MRI 12/10/19: \par 1. Three hepatic focal nodular hyperplasia without significant change. \par 2. Neurofibromatosis with several neurofibromas demonstrating interval growth compared with prior imaging. \par \par Seen by karoline Pulidox 1/9/2020; he will assist with Dr. Griffith and Dr. Larry, NSx in the resection of the thoracic lesion. According to his note, possibility of chest tube was discussed.  \par \par \par JAYLIN continues to complain of R back> R flank pain that is at worst 10/10, described as shooting and throbbing, and wakes her up every night. During the day her pain is 4-5. It is not relieved by Neurontin 300mg qam and 600mg Gabapentin QHS. She is scheduled to have surgery with Dr. Griffith, likely in February, to remove neurofibromas that may be causing her pain.\par No side effects from Neurontin\par She has no other complaints.

## 2020-01-27 NOTE — REASON FOR VISIT
[Follow-Up Evaluation] : a follow-up evaluation for [Patient] : patient [Mother] : mother [Medical Records] : medical records [Other: ____] : [unfilled] [FreeTextEntry2] : In [facility]

## 2020-01-27 NOTE — CONSULT LETTER
[Dear  ___] : Dear  [unfilled], [Consult Letter:] : I had the pleasure of evaluating your patient, [unfilled]. [Please see my note below.] : Please see my note below. [Consult Closing:] : Thank you very much for allowing me to participate in the care of this patient.  If you have any questions, please do not hesitate to contact me. [Sincerely,] : Sincerely, [FreeTextEntry3] : Jane Huitron  \par Neurology resident\par \par Ina MORELAND\par Pediatric neurology attending\par Neurofibromatosis clinic Co-director\par [facility]\par [facility]\par Tel: [phone]\par Fax: [phone]\par \par

## 2020-01-27 NOTE — PHYSICAL EXAM
[Cranial Nerves Oculomotor (III)] : extraocular motion intact [Cranial Nerves Optic (II)] : visual acuity intact bilaterally,  visual fields full to confrontation, pupils equal round and reactive to light [Cranial Nerves Accessory (XI - Cranial And Spinal)] : head turning and shoulder shrug symmetric [Cranial Nerves Glossopharyngeal (IX)] : tongue and palate midline [Cranial Nerves Facial (VII)] : face symmetrical [Cranial Nerves Hypoglossal (XII)] : there was no tongue deviation with protrusion [Normal] : patient has a normal gait including toe-walking, heel-walking and tandem walking. Romberg sign is negative. [de-identified] : throat clear [de-identified] : fulness right flank just above the old surgical scar;  nontender to light touch / allodynia from lower back, midline, circumferential to the right flank to right lower abdomen.  [de-identified] : no palpable masses over the buttock [de-identified] : multiple JEANNE macules consistent with NF1; telangectatic patch right back; cutaneous neurofibromas; [de-identified] : no spino-skeletal anomalies [de-identified] : no dysmetria [Well-appearing] : well-appearing [No deformities] : no deformities [Alert] : alert [Well related, good eye contact] : well related, good eye contact [Conversant] : conversant [Normal speech and language] : normal speech and language [Follows instructions well] : follows instructions well [Full extraocular movements] : full extraocular movements [No nystagmus] : no nystagmus [No papilledema] : no papilledema [No facial asymmetry or weakness] : no facial asymmetry or weakness [Equal palate elevation] : equal palate elevation [Good shoulder shrug] : good shoulder shrug [Normal tongue movement] : normal tongue movement [Gets up on table without difficulty] : gets up on table without difficulty [No pronator drift] : no pronator drift [Normal finger tapping and fine finger movements] : normal finger tapping and fine finger movements [No abnormal involuntary movements] : no abnormal involuntary movements [5/5 strength in proximal and distal muscles of arms and legs] : 5/5 strength in proximal and distal muscles of arms and legs [Walks and runs well] : walks and runs well [Able to walk on heels] : able to walk on heels [Able to walk on toes] : able to walk on toes [2+ biceps] : 2+ biceps [Knee jerks] : knee jerks [No ankle clonus] : no ankle clonus [Localizes LT and temperature] : localizes LT and temperature [No dysmetria on FTNT] : no dysmetria on FTNT [Normal gait] : normal gait [Able to tandem well] : able to tandem well [de-identified] : awake, alert, in NAD; pleasant and cooperative [de-identified] : throat clear [de-identified] : fulness right flank just above the old surgical scar; tenderness to touch circumferential to the right flank to right lower abdomen.  [de-identified] : multiple JEANNE macules; telangectatic patch right back; cutaneous neurofibromas;

## 2020-01-27 NOTE — DATA REVIEWED
[FreeTextEntry1] : EXAM: MR SPINE THORACIC WAW IC \par EXAM: MR SPINE LUMBAR WAW IC \par PROCEDURE DATE: Oct 29 2019 \par INTERPRETATION: History: Neurofibromatosis type I. \par Comparison: MRI of the thoracic spine from 7/8/2019 and 3/7/2018; and MRI of \par the thoracic and lumbar spine from 11/9/2018. \par Technique: Sagittal T2-weighted, sagittal T1-weighted, sagittal STIR, axial \par T2-weighted, axial T1-weighted, gadolinium enhanced fat-suppressed sagittal \par T1-weighted and axial T1-weighted images of the thoracic spine were \par obtained. The contrast material was intravenously administered Gadavist (5 \par mL, 2.5 mL discarded). \par \par Findings: The heights and signal intensities of the thoracic vertebral \par bodies are normal. The normal alignment of the cervical spine is maintained. \par \par The eccentric, intramedullary lesion of the spinal cord at T10-T11 level is \par has increased compared with 3/7/2019 measuring approximately 4.5 cm versus \par 3.5 cm (series #16, image #11 versus series #10, image #9). On the axial \par images, the lesion is larger as well with effacement of the subarachnoidal \par fluid spaces (series #24, image #15 versus series #12, image #24). The \par lesion does not show appreciable enhancement. \par \par The nonenhancing focus of nonexpansile T2 prolongation within the spinal \par cord at mid T7 level is stable. \par \par The partially enhancing right T10-T11 neural foraminal nerve sheath tumor is \par larger compared with 3/7/2019 measuring 2.1 cm x 4.1 cm versus 1.5 cm x 3.4 \par cm (series #23, image #13 versus series #12, image #7). There is greater \par extension of the tumor into the spinal canal compared with the previous \par examination from 3/7/2019. The tumor does not abut the spinal cord, however \par is in close proximity of the intramedullary tumor at this level. \par \par There is stable nonvisualization of the right kidney. The left kidney is \par normal in position without hydronephrosis. There are multiple \par retroperitoneal nerve sheath tumors, the largest of which is anterior to the \par left psoas muscle, measuring approximately 4.3 cm x 4.0 cm versus 3.6 cm x \par 3.4 cm (series #24, image #28 versus series #12, image #1 on 3/7/2019). \par \par Impression: \par \par 1. The intramedullary tumor at T10 and T11 levels is increased in size with \par the 2 previous examinations, as described. \par \par 2. The nonenhancing, nonexpansile lesion within the spinal cord at T7 level \par is unchanged. \par \par 3. The extraforaminal component of the nerve sheath tumor related to the \par right T10-T11 neural foramen is larger compared to previously compared with \par the 2 previous examinations. \par \par 4. The largest retroperitoneal nerve sheath tumor anterior to the left psoas \par muscle has increased in size. \par \par A secure text was sent to Angelica Abernathy NP on 10/30/2019 at 1410 hours. \par \par ASHLI DENNISON M.D., ATTENDING RADIOLOGIST \par This document has been electronically signed. Oct 30 2019 2:15PM \par \par \par \par EXAM: MR SPINE THORACIC WAW IC \par PROCEDURE DATE: Jul 8 2019 \par IMPRESSION: \par Stable size and configuration of the spinal cord tumor at T10-T11 with \par interval resolution of previously noted enhancement. \par Nonenhancing nonexpansile lesion within the spinal cord at T7 is less \par conspicuous on current examination. \par The extra foraminal component of the nerve sheath tumor related to the right \par T10-T11 neural foramen is minimally increased in size since prior. \par \par ADRIANE GREEN M.D., ATTENDING RADIOLOGIST \par This document has been electronically signed. Jul 9 2019 3:16PM \par \par ______________________________________________________________________________\par \par \par EXAM: MR ABDOMEN WAW IC \par PROCEDURE DATE: 12/10/2019 \par INTERPRETATION: CLINICAL INFORMATION: Neurofibromatosis. Hepatic lesions \par consistent with focal nodular hyperplasia. Follow-up. \par COMPARISON: MRI 5/10/2019, 1/23/2019 \par PROCEDURE: \par MRI of the abdomen was performed with and without intravenous contrast. \par IV Contrast: Eovist. 10 cc administered, 0 cc discarded. \par MRCP was performed. \par \par FINDINGS: \par \par LOWER CHEST: Several neurofibromas in the paraspinal region with interval \par growth of a lesion in the right lower hemithorax (6:8) 2.6 x 2.2 cm, \par previously 2.5 x 1.2 cm 1/23/2019. \par \par LIVER: Normal morphology. No steatosis. Three hepatic lesions demonstrating \par signal similar to background liver with arterial phase enhancement and \par uptake of hepatobiliary agent at hepatobiliary phase imaging consistent with \par focal nodular hyperplasia are without change. Lesions are as follows: \par \par Caudate lobe (16:13) disease 2.5 x 1.6 cm. \par \par Segment 6 (16:21) 1.2 x 1.1 cm. \par \par Left hepatic lobe lateral segment (16:7) 1.7 x 1.6 cm. \par \par BILE DUCTS: Normal caliber. \par GALLBLADDER: Within normal limits. \par SPLEEN: Within normal limits. \par PANCREAS: Within normal limits. \par ADRENALS: Within normal limits. \par KIDNEYS/URETERS: Right nephrectomy. \par \par VISUALIZED PORTIONS: \par \par BOWEL: Within normal limits. \par PERITONEUM: No ascites. \par VESSELS: Hepatic and portal veins are patent. \par RETROPERITONEUM/LYMPH NODES: Several retroperitoneal neurofibromas with the \par largest in the left para-aortic region (11:25) 7.3 x 4.5 cm, previously 6.7 \par x 3.9 cm at prior imaging 1/23/2019. \par ABDOMINAL WALL: Several neurofibromas along the right abdominal wall without \par significant change. \par BONES: Within normal limits. \par \par IMPRESSION: \par \par Three hepatic focal nodular hyperplasia without significant change. \par \par Neurofibromatosis with several neurofibromas demonstrating interval growth \par compared with prior imaging as above. \par \par LOU SCHULTZ M.D., ATTENDING RADIOLOGIST \par This document has been electronically signed. Dec 11 2019 8:40AM \par \par \par EXAM: MR PELVIS WAW IC \par EXAM: MR ABDOMEN WAW IC \par PROCEDURE DATE: 09/25/2018 \par IMPRESSION: \par Indeterminate hepatic lesions for which a follow-up MRI in 3 months is advised. \par Multiple other neurofibromas which have increased in size in the interim, \par including three in the inner right lateral abdominal wall. \par ______________________________________________________________________________\par \par EXAM: CT BRAIN \par PROCEDURE DATE: Mar 15 2019 \par INTERPRETATION: CLINICAL INFORMATION: History of NF1. Headaches. \par IMPRESSION: \par No acute intracranial hemorrhage or mass effect. \par Previously described stigmata of neurofibromatosis type I including left \par optic nerve enlargement and a tubular nodular structure along the frontal \par vertex appear unchanged. \par _____________________________________________________________________________\par \par EXAM: MR BRAIN WAW IC \par EXAM: MR ORBIT FACE AND ORNECK WAWIC \par PROCEDURE DATE: 09/17/2018 \par IMPRESSION: \par Stable exam. \par Stable intracranial stigmata associated with known neurofibromatosis type I. 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 \par \par VENANCIO AHN M.D., ATTENDING RADIOLOGIST Phone #: \par This document has been electronically signed. Sep 18 2018 3:35PM

## 2020-01-27 NOTE — ASSESSMENT
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 S/P right adrenalectomy and right nephrectomy and CTx for malignant nerve sheath tumor. S/P removal of scalp neurofibroma. S/P excisional biopsy of right flank neurofibroma Dec 2017. MR abdomen 12/2019 and MR spine 10/2019 with interval growth of NF tumors but no cord compression. She has FNH of the liver, and is under c/o hepatology, and sees Dr. Griffith regularly for spine lesion and R flank pain, for which surgery is planned.  On exam she has NF1 stigmata, tenderness to touch over right back and flank, otherwise non focal exam.\par \par Plan:\par - Titrate Neurontin by 300mg / day every 5 days to a max of 900 mg t.i.d as needed; get BMP\par - F/U Brain MRI Sept 2020 (2 years interval); sooner as needed\par - F/U 2 months\par Mother and JAYLIN report understanding and agree with plan

## 2020-01-31 ENCOUNTER — OUTPATIENT (OUTPATIENT)
Dept: OUTPATIENT SERVICES | Age: 20
LOS: 1 days | End: 2020-01-31

## 2020-01-31 VITALS
DIASTOLIC BLOOD PRESSURE: 62 MMHG | TEMPERATURE: 98 F | HEART RATE: 77 BPM | RESPIRATION RATE: 16 BRPM | WEIGHT: 105.38 LBS | OXYGEN SATURATION: 99 % | HEIGHT: 62.64 IN | SYSTOLIC BLOOD PRESSURE: 102 MMHG

## 2020-01-31 DIAGNOSIS — Z78.9 OTHER SPECIFIED HEALTH STATUS: Chronic | ICD-10-CM

## 2020-01-31 DIAGNOSIS — Q85.01 NEUROFIBROMATOSIS, TYPE 1: ICD-10-CM

## 2020-01-31 DIAGNOSIS — D36.10 BENIGN NEOPLASM OF PERIPHERAL NERVES AND AUTONOMIC NERVOUS SYSTEM, UNSPECIFIED: Chronic | ICD-10-CM

## 2020-01-31 DIAGNOSIS — Z01.818 ENCOUNTER FOR OTHER PREPROCEDURAL EXAMINATION: ICD-10-CM

## 2020-01-31 DIAGNOSIS — M54.9 DORSALGIA, UNSPECIFIED: ICD-10-CM

## 2020-01-31 LAB
ANION GAP SERPL CALC-SCNC: 12 MMO/L — SIGNIFICANT CHANGE UP (ref 7–14)
APTT BLD: 29.1 SEC — SIGNIFICANT CHANGE UP (ref 27.5–36.3)
BLD GP AB SCN SERPL QL: NEGATIVE — SIGNIFICANT CHANGE UP
BUN SERPL-MCNC: 15 MG/DL — SIGNIFICANT CHANGE UP (ref 7–23)
CALCIUM SERPL-MCNC: 9.4 MG/DL — SIGNIFICANT CHANGE UP (ref 8.4–10.5)
CHLORIDE SERPL-SCNC: 104 MMOL/L — SIGNIFICANT CHANGE UP (ref 98–107)
CO2 SERPL-SCNC: 22 MMOL/L — SIGNIFICANT CHANGE UP (ref 22–31)
CREAT SERPL-MCNC: 0.96 MG/DL — SIGNIFICANT CHANGE UP (ref 0.5–1.3)
FACT II CIRC INHIB PPP QL: 12.1 SEC — SIGNIFICANT CHANGE UP (ref 9.8–13.1)
FACT II CIRC INHIB PPP QL: SIGNIFICANT CHANGE UP SEC (ref 27.5–37.4)
GLUCOSE SERPL-MCNC: 83 MG/DL — SIGNIFICANT CHANGE UP (ref 70–99)
HCG SERPL-ACNC: < 5 MIU/ML — SIGNIFICANT CHANGE UP
HCT VFR BLD CALC: 39 % — SIGNIFICANT CHANGE UP (ref 34.5–45)
HGB BLD-MCNC: 13.5 G/DL — SIGNIFICANT CHANGE UP (ref 11.5–15.5)
INR BLD: 1.21 — HIGH (ref 0.88–1.17)
MAGNESIUM SERPL-MCNC: 2.1 MG/DL — SIGNIFICANT CHANGE UP (ref 1.6–2.6)
MCHC RBC-ENTMCNC: 32.8 PG — SIGNIFICANT CHANGE UP (ref 27–34)
MCHC RBC-ENTMCNC: 34.6 % — SIGNIFICANT CHANGE UP (ref 32–36)
MCV RBC AUTO: 94.9 FL — SIGNIFICANT CHANGE UP (ref 80–100)
NRBC # FLD: 0 K/UL — SIGNIFICANT CHANGE UP (ref 0–0)
PHOSPHATE SERPL-MCNC: 2.6 MG/DL — SIGNIFICANT CHANGE UP (ref 2.5–4.5)
PLATELET # BLD AUTO: 240 K/UL — SIGNIFICANT CHANGE UP (ref 150–400)
PMV BLD: 10.2 FL — SIGNIFICANT CHANGE UP (ref 7–13)
POTASSIUM SERPL-MCNC: 4.1 MMOL/L — SIGNIFICANT CHANGE UP (ref 3.5–5.3)
POTASSIUM SERPL-SCNC: 4.1 MMOL/L — SIGNIFICANT CHANGE UP (ref 3.5–5.3)
PROTHROM AB SERPL-ACNC: 13.5 SEC — HIGH (ref 9.8–13.1)
PROTHROMBIN TIME/NOMAL: 11.7 SEC — SIGNIFICANT CHANGE UP (ref 9.8–13.1)
PROTHROMBIN TIME/NOMAL: 32.2 SEC — SIGNIFICANT CHANGE UP (ref 27.5–37.4)
PT INHIB SC 2 HR: 12.5 SEC — SIGNIFICANT CHANGE UP (ref 9.8–13.1)
RBC # BLD: 4.11 M/UL — SIGNIFICANT CHANGE UP (ref 3.8–5.2)
RBC # FLD: 11.8 % — SIGNIFICANT CHANGE UP (ref 10.3–14.5)
RH IG SCN BLD-IMP: POSITIVE — SIGNIFICANT CHANGE UP
SODIUM SERPL-SCNC: 138 MMOL/L — SIGNIFICANT CHANGE UP (ref 135–145)
WBC # BLD: 5.74 K/UL — SIGNIFICANT CHANGE UP (ref 3.8–10.5)
WBC # FLD AUTO: 5.74 K/UL — SIGNIFICANT CHANGE UP (ref 3.8–10.5)

## 2020-01-31 NOTE — H&P PST ADULT - MUSCULOSKELETAL COMMENTS
large neurofibroma to right side of back. reports constant pain of 8/10 that is unchanged despite gabapentin. Denies any bowel or bladder function changes.

## 2020-01-31 NOTE — H&P PST ADULT - GASTROINTESTINAL COMMENTS
healed surgical scar, +pierced naval. healed surgical scar (right flank extending to abdomen), +pierced naval.

## 2020-01-31 NOTE — H&P PST ADULT - NSICDXPROBLEM_GEN_ALL_CORE_FT
PROBLEM DIAGNOSES  Problem: Neurofibromatosis, type 1  Assessment and Plan: scheduled for right T9 laminectomy for resection of neurofibroma with Dr. Larry, resection of right back mass possible chest tube with Dr. Cassidy/ on 2/5/20. *Dr. Cedeno to add codes.

## 2020-01-31 NOTE — H&P PST ADULT - ASSESSMENT
19yo F with complex PMH.     No evidence of acute illness or infection.     No known family h/o adverse reactions to anesthesia or excessive bleeding.     Aware to notify surgeon's office if child develops any s/s of acute illness prior to DOS.     *Chlorhexidine wipes given. States understanding of use.     Healthcare Proxy form provided. 21yo F with complex PMH.     No evidence of acute illness or infection.     No known family h/o adverse reactions to anesthesia or excessive bleeding.     Aware to notify surgeon's office if Cate develops any s/s of acute illness prior to DOS.     *Chlorhexidine wipes given. States understanding of use.     Healthcare Proxy form provided.     *Cate is past due for cardiac f/u. Cardiac MRI from 2015 noted "multiple small soft tissue nodules abutting about the pericardium". One nodule had increased in size from her prior study.   She was advised to f/u in 2018 to assess her ejection fraction.   Pre-op f/u appt made for 2/4/20 with Dr. Martinez for ECHO and cardiac clearance. Mother made aware appt must be kept prior to DOS. Dr. Larry, Dr. Griffith, Dr. Cassidy and Dr. Cedeno updated via email.

## 2020-01-31 NOTE — H&P PST ADULT - OTHER CARE PROVIDERS
Dr. Tellez (dermatology) Neurologist: Dr. Guillen; Hem/Onc: Cata Anders NP & Wan Cerda MD; Cardiologist: So Doshi MD; Nephrologist: Martina Garcia

## 2020-01-31 NOTE — H&P PST ADULT - LAB RESULTS AND INTERPRETATION
CBC, BMP, T&S, HCG and PT/PTT w/mixing studies ordered as indicated.  *Urine specimen container provided for DOS.

## 2020-01-31 NOTE — H&P PST ADULT - GENITOURINARY COMMENTS
s/p right nephrectomy and adrenalectomy, see HPI. Most recent consult with Dr. Garcia from 2017 attached. Past due for f/u.

## 2020-01-31 NOTE — H&P PST ADULT - DOES THIS PATIENT HAVE A HISTORY OF OR HAS BEEN DX WITH HEART FAILURE?
Chief Complaint   Patient presents with   • Wrist Injury     Left wrist pain, fell on Fri   • Other     Mom with patient       SUBJECTIVE:  Cindy is a 14 year old female who is seen for left wrist injury on Friday she tripped and fell in track.  She fell forward and landed on outstretched hand.  Still hurts and maybe a bit better.  She is a pole vaulter.      Past Medical History:   Diagnosis Date   • Foot fracture    • Pain in limb 5/24/2014       Family History   Problem Relation Age of Onset   • Thyroid Mother      hypothyroid   • NEGATIVE FAMILY HX OF Father    • Allergies Brother    • Thyroid Maternal Grandmother      hypothyroid    • NEGATIVE FAMILY HX OF Maternal Grandfather    • High blood pressure Paternal Grandmother    • Heart disease Paternal Grandfather        History   Smoking Status   • Never Smoker   Smokeless Tobacco   • Never Used       ALLERGIES:  No Known Allergies    Current Outpatient Prescriptions   Medication   • DOXYCYCLINE PO   • glycopyrrolate (ROBINUL) 1 MG tablet   • ibuprofen (MOTRIN) 200 MG tablet   • albuterol 108 (90 BASE) MCG/ACT inhaler   • Multiple Vitamins-Minerals (MULTIVITAMIN PO)     No current facility-administered medications for this visit.        REVIEW OF SYSTEMS:  In addition to that noted above, review of systems is remarkable for:  Left wrist swelling and discomfort worsening since she fell on Friday. She points to her lateral wrist near her distal ulna  Rest of the 14-point review of systems reviewed with patient and are negative.    OBJECTIVE:   Visit Vitals   • /64   • Pulse 68   • Temp 98.4 °F (36.9 °C) (Oral)   • Resp 16   • Ht 5' 3\" (1.6 m)   • Wt 57 kg   • LMP 04/24/2017   • BMI 22.26 kg/m2     VITALS:     GENERAL: Appears stated age.  PSYCHIATRIC:  Affect is good, mood good, alert and oriented x3, cranial nerves 2-12 grossly intact.  Minimal swelling around the distal ulna no specific bony tenderness but she is generally sore in that area  Rest of exam  deferred.    Xray of wrist no obvious fracture     ASSESSMENT/ PLAN:  1. Left wrist sprain she was put in a splint for comfort radiology agrees no fracture nurse will call mother with the official read. Icing and rest and anti-inflammatories discussed   no

## 2020-01-31 NOTE — H&P PST ADULT - CONSTITUTIONAL COMMENTS
Thin, acyanotic, cooperative, pleasant, in NAD. Thin, acyanotic, cooperative, pleasant, in NAD. healed surgical scar to right side of occiput.

## 2020-01-31 NOTE — H&P PST ADULT - SKIN COMMENTS
healed surgical scar to right side of head. healed surgical scar to right upper chest (prior central line placement).

## 2020-01-31 NOTE — H&P PST ADULT - NSICDXPASTSURGICALHX_GEN_ALL_CORE_FT
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

## 2020-01-31 NOTE — H&P PST ADULT - EXTREMITIES COMMENTS
+large neurofibroma to right outer thigh. +large neurofibroma to right outer thigh.  multiple cafe au lait spots over entire body.

## 2020-01-31 NOTE — H&P PST ADULT - CARDIOVASCULAR COMMENTS
Most recent Cardiac evaluation 2017, f/u recommended in one year (past due). Denies any s/s of cardiac origin.

## 2020-01-31 NOTE — H&P PST ADULT - HEMATOLOGY/LYMPHATICS COMMENTS
Hem/Onc PST form completed and attached. Hem/Onc PST form completed and attached. Survivorship program.

## 2020-01-31 NOTE — H&P PST ADULT - NOTES
working: [employer]. currently working at [employer], attended [employer], interested in 3D animation.

## 2020-01-31 NOTE — H&P PST ADULT - REASON FOR ADMISSION
PST evaluation in preparation for right T9 Laminectomy for resection of neurofibroma with Dr. Larry; Resection of right back mass possible chest tube with Dr. Cassidy/ on 2/5/20.   Dr. Cedeno to add codes.

## 2020-01-31 NOTE — H&P PST ADULT - VTE RISK COMMENTS
Maternal great grandfather: no other family members with thrombus. Maternal great grandfather h/o thrombus (DVT), no other family members affected.  Consider use of Venodyne boots.

## 2020-01-31 NOTE — H&P PST ADULT - RESPIRATORY AND THORAX COMMENTS
albuterol nebs not used in 5 years. albuterol nebs not used in 5 years after she developed hives with use.

## 2020-01-31 NOTE — H&P PST ADULT - EKG AND INTERPRETATION
2/3/2020  ECHO Conclusions:  1. Normal mitral valve. Minimal mitral regurgitation.  2. Normal trileaflet aortic valve. No aortic valve  regurgitation seen.  3. Normal left ventricular systolic function. No segmental  wall motion abnormalities.  4. Normal diastolic function  5. Normal right ventricular size and function.  *** Compared with echocardiogram of 3/3/2017, no  significant changes noted.  ------------------------------------------------------------------------  Confirmed on  2/3/2020 - 13:58:06 by Manny Lilly M.D.  ---------------------------------------------------------------  EKG 2/3/2020- WNL per Luc Martinez's note.

## 2020-01-31 NOTE — H&P PST ADULT - ATTENDING COMMENTS
I explained all the r/b/a of paraspinal and intraspinal tumor resection with T10/11 and possible T9-12 rib resection and possible T9-12 hemilaminectomy.  She and her mother understand and wish to proceed with surgery

## 2020-01-31 NOTE — H&P PST ADULT - HISTORY OF PRESENT ILLNESS
19yo F with PMH significant for 21yo F with PMH significant for     vit d supplement. 21yo F with PMH significant for Neurofibromatosis Type 1, optic glioma, solitary kidney and albuterol allergy.    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   Cate is now scheduled for combined surgical intervention for resection of a neurofibroma to her back which has been causing pain for the past year.     No h/o anesthetic or surgical complications with prior procedures.     Denies any recent acute illness in the past two weeks.

## 2020-01-31 NOTE — H&P PST ADULT - NSICDXPASTMEDICALHX_GEN_ALL_CORE_FT
PAST MEDICAL HISTORY:  Adrenal Cortex Neoplasm     Cafe-au-lait spots     Nerve sheath tumor     Neurofibromatosis, Type 1     Optic glioma     Solitary kidney s/p nephrectomy

## 2020-01-31 NOTE — H&P PST ADULT - PRIMARY CARE PROVIDER
Dr. Juancarlos Bo Follows with Neurologist: Dr. Guillen No longer follows with pediatrician, Dr. Bo; currently does not have an internist.

## 2020-02-03 ENCOUNTER — OUTPATIENT (OUTPATIENT)
Dept: OUTPATIENT SERVICES | Facility: HOSPITAL | Age: 20
LOS: 1 days | End: 2020-02-03
Payer: COMMERCIAL

## 2020-02-03 ENCOUNTER — APPOINTMENT (OUTPATIENT)
Dept: CV DIAGNOSITCS | Facility: HOSPITAL | Age: 20
End: 2020-02-03

## 2020-02-03 DIAGNOSIS — Z78.9 OTHER SPECIFIED HEALTH STATUS: Chronic | ICD-10-CM

## 2020-02-03 DIAGNOSIS — I10 ESSENTIAL (PRIMARY) HYPERTENSION: ICD-10-CM

## 2020-02-03 DIAGNOSIS — D36.10 BENIGN NEOPLASM OF PERIPHERAL NERVES AND AUTONOMIC NERVOUS SYSTEM, UNSPECIFIED: Chronic | ICD-10-CM

## 2020-02-03 LAB
FACT II INHIB PPP-ACNC: 70.4 % — LOW (ref 75–135)
FACT V ACT/NOR PPP: 80.8 % — SIGNIFICANT CHANGE UP (ref 75–150)
FACT VII ACT/NOR PPP: 53.2 % — LOW (ref 70–165)
FACT X ACT/NOR PPP: 56.8 % — LOW (ref 70–150)

## 2020-02-03 PROCEDURE — 93306 TTE W/DOPPLER COMPLETE: CPT

## 2020-02-03 PROCEDURE — 93306 TTE W/DOPPLER COMPLETE: CPT | Mod: 26

## 2020-02-04 ENCOUNTER — NON-APPOINTMENT (OUTPATIENT)
Age: 20
End: 2020-02-04

## 2020-02-04 ENCOUNTER — APPOINTMENT (OUTPATIENT)
Dept: CARDIOLOGY | Facility: CLINIC | Age: 20
End: 2020-02-04
Payer: COMMERCIAL

## 2020-02-04 VITALS
DIASTOLIC BLOOD PRESSURE: 62 MMHG | WEIGHT: 106 LBS | BODY MASS INDEX: 18.55 KG/M2 | OXYGEN SATURATION: 100 % | HEIGHT: 63.39 IN | HEART RATE: 79 BPM | SYSTOLIC BLOOD PRESSURE: 98 MMHG

## 2020-02-04 PROCEDURE — 93000 ELECTROCARDIOGRAM COMPLETE: CPT

## 2020-02-04 PROCEDURE — 99245 OFF/OP CONSLTJ NEW/EST HI 55: CPT

## 2020-02-04 RX ORDER — GABAPENTIN 300 MG/1
300 CAPSULE ORAL
Qty: 180 | Refills: 1 | Status: DISCONTINUED | COMMUNITY
Start: 2020-01-16 | End: 2020-02-04

## 2020-02-04 RX ORDER — GABAPENTIN 300 MG/1
300 CAPSULE ORAL TWICE DAILY
Refills: 0 | Status: DISCONTINUED | COMMUNITY
Start: 2019-08-27 | End: 2020-02-04

## 2020-02-04 NOTE — DISCUSSION/SUMMARY
[Procedure Intermediate Risk] : the procedure risk is intermediate [Patient Low Risk] : the patient is a low surgical risk [Optimized for Surgery] : the patient is optimized for surgery [FreeTextEntry1] : The patient is a 20-year-old female Neurofibromatosis Type I, anthracycline in the past awaiting laminectomy tomorrow for neurofibroma. She is asymptomatic. ECHO with normal LV and RV function. No significant valve disease. There are no cardiac contraindications to proceeding with surgery as planned.

## 2020-02-04 NOTE — HISTORY OF PRESENT ILLNESS
[Preoperative Visit] : for a medical evaluation prior to surgery [Date of Surgery ___] : on [unfilled] [Scheduled Procedure ___] : a [unfilled] [Surgeon Name ___] : surgeon: [unfilled] [FreeTextEntry1] : Cate is a 20-year-old female Neurofibromatosis Type I with neurofibromatosis of the spine awaiting surgery tomorrow. She has received chemo with anthracycline in the past and has been screened for cardiomyopathy. Denies any chest pain, palpitations or shortness of breath.

## 2020-02-05 ENCOUNTER — RESULT REVIEW (OUTPATIENT)
Age: 20
End: 2020-02-05

## 2020-02-05 ENCOUNTER — INPATIENT (INPATIENT)
Age: 20
LOS: 1 days | Discharge: HOME | End: 2020-02-07
Attending: NEUROLOGICAL SURGERY | Admitting: NEUROLOGICAL SURGERY
Payer: COMMERCIAL

## 2020-02-05 VITALS
SYSTOLIC BLOOD PRESSURE: 98 MMHG | TEMPERATURE: 98 F | HEIGHT: 62.64 IN | RESPIRATION RATE: 18 BRPM | DIASTOLIC BLOOD PRESSURE: 53 MMHG | WEIGHT: 105.38 LBS | OXYGEN SATURATION: 99 % | HEART RATE: 73 BPM

## 2020-02-05 DIAGNOSIS — Z78.9 OTHER SPECIFIED HEALTH STATUS: Chronic | ICD-10-CM

## 2020-02-05 DIAGNOSIS — D36.10 BENIGN NEOPLASM OF PERIPHERAL NERVES AND AUTONOMIC NERVOUS SYSTEM, UNSPECIFIED: Chronic | ICD-10-CM

## 2020-02-05 DIAGNOSIS — M54.9 DORSALGIA, UNSPECIFIED: ICD-10-CM

## 2020-02-05 LAB
BASE EXCESS BLDA CALC-SCNC: -2.6 MMOL/L — SIGNIFICANT CHANGE UP
CA-I BLDA-SCNC: 1.18 MMOL/L — SIGNIFICANT CHANGE UP (ref 1.15–1.29)
GLUCOSE BLDA-MCNC: 84 MG/DL — SIGNIFICANT CHANGE UP (ref 70–99)
HCG UR QL: NEGATIVE — SIGNIFICANT CHANGE UP
HCO3 BLDA-SCNC: 22 MMOL/L — SIGNIFICANT CHANGE UP (ref 22–26)
HCT VFR BLDA CALC: 30.1 % — LOW (ref 34.5–46.5)
HGB BLDA-MCNC: 9.7 G/DL — LOW (ref 11.5–15.5)
PCO2 BLDA: 36 MMHG — SIGNIFICANT CHANGE UP (ref 32–48)
PH BLDA: 7.39 PH — SIGNIFICANT CHANGE UP (ref 7.35–7.45)
PO2 BLDA: 459 MMHG — HIGH (ref 83–108)
POTASSIUM BLDA-SCNC: 3.2 MMOL/L — LOW (ref 3.4–4.5)
SAO2 % BLDA: 100 % — HIGH (ref 95–99)
SODIUM BLDA-SCNC: 138 MMOL/L — SIGNIFICANT CHANGE UP (ref 136–146)

## 2020-02-05 PROCEDURE — 88307 TISSUE EXAM BY PATHOLOGIST: CPT | Mod: 26

## 2020-02-05 PROCEDURE — 88360 TUMOR IMMUNOHISTOCHEM/MANUAL: CPT | Mod: 26

## 2020-02-05 PROCEDURE — 88341 IMHCHEM/IMCYTCHM EA ADD ANTB: CPT | Mod: 26,59

## 2020-02-05 PROCEDURE — 88342 IMHCHEM/IMCYTCHM 1ST ANTB: CPT | Mod: 26,59

## 2020-02-05 RX ORDER — IBUPROFEN 200 MG
400 TABLET ORAL EVERY 6 HOURS
Refills: 0 | Status: DISCONTINUED | OUTPATIENT
Start: 2020-02-05 | End: 2020-02-06

## 2020-02-05 RX ORDER — MORPHINE SULFATE 50 MG/1
2 CAPSULE, EXTENDED RELEASE ORAL
Refills: 0 | Status: DISCONTINUED | OUTPATIENT
Start: 2020-02-05 | End: 2020-02-05

## 2020-02-05 RX ORDER — DEXAMETHASONE 0.5 MG/5ML
3 ELIXIR ORAL EVERY 8 HOURS
Refills: 0 | Status: DISCONTINUED | OUTPATIENT
Start: 2020-02-06 | End: 2020-02-06

## 2020-02-05 RX ORDER — HYDROMORPHONE HYDROCHLORIDE 2 MG/ML
0.25 INJECTION INTRAMUSCULAR; INTRAVENOUS; SUBCUTANEOUS
Refills: 0 | Status: DISCONTINUED | OUTPATIENT
Start: 2020-02-05 | End: 2020-02-05

## 2020-02-05 RX ORDER — CEFAZOLIN SODIUM 1 G
1590 VIAL (EA) INJECTION EVERY 8 HOURS
Refills: 0 | Status: DISCONTINUED | OUTPATIENT
Start: 2020-02-05 | End: 2020-02-07

## 2020-02-05 RX ORDER — ONDANSETRON 8 MG/1
4 TABLET, FILM COATED ORAL ONCE
Refills: 0 | Status: DISCONTINUED | OUTPATIENT
Start: 2020-02-05 | End: 2020-02-05

## 2020-02-05 RX ORDER — SODIUM CHLORIDE 9 MG/ML
1000 INJECTION, SOLUTION INTRAVENOUS
Refills: 0 | Status: DISCONTINUED | OUTPATIENT
Start: 2020-02-05 | End: 2020-02-05

## 2020-02-05 RX ORDER — DIAZEPAM 5 MG
1.9 TABLET ORAL EVERY 4 HOURS
Refills: 0 | Status: DISCONTINUED | OUTPATIENT
Start: 2020-02-05 | End: 2020-02-06

## 2020-02-05 RX ORDER — HYDROMORPHONE HYDROCHLORIDE 2 MG/ML
0.5 INJECTION INTRAMUSCULAR; INTRAVENOUS; SUBCUTANEOUS
Refills: 0 | Status: DISCONTINUED | OUTPATIENT
Start: 2020-02-05 | End: 2020-02-05

## 2020-02-05 RX ORDER — OXYCODONE HYDROCHLORIDE 5 MG/1
2.4 TABLET ORAL EVERY 4 HOURS
Refills: 0 | Status: DISCONTINUED | OUTPATIENT
Start: 2020-02-05 | End: 2020-02-06

## 2020-02-05 RX ORDER — DEXAMETHASONE 0.5 MG/5ML
4 ELIXIR ORAL EVERY 6 HOURS
Refills: 0 | Status: DISCONTINUED | OUTPATIENT
Start: 2020-02-05 | End: 2020-02-06

## 2020-02-05 RX ORDER — SODIUM CHLORIDE 9 MG/ML
3 INJECTION INTRAMUSCULAR; INTRAVENOUS; SUBCUTANEOUS EVERY 8 HOURS
Refills: 0 | Status: DISCONTINUED | OUTPATIENT
Start: 2020-02-05 | End: 2020-02-07

## 2020-02-05 RX ORDER — DEXAMETHASONE 0.5 MG/5ML
4 ELIXIR ORAL EVERY 6 HOURS
Refills: 0 | Status: DISCONTINUED | OUTPATIENT
Start: 2020-02-05 | End: 2020-02-05

## 2020-02-05 RX ORDER — ACETAMINOPHEN 500 MG
480 TABLET ORAL EVERY 6 HOURS
Refills: 0 | Status: DISCONTINUED | OUTPATIENT
Start: 2020-02-05 | End: 2020-02-07

## 2020-02-05 RX ORDER — ENOXAPARIN SODIUM 100 MG/ML
30 INJECTION SUBCUTANEOUS AT BEDTIME
Refills: 0 | Status: DISCONTINUED | OUTPATIENT
Start: 2020-02-06 | End: 2020-02-07

## 2020-02-05 RX ADMIN — Medication 4 MILLIGRAM(S): at 15:00

## 2020-02-05 RX ADMIN — Medication 480 MILLIGRAM(S): at 20:41

## 2020-02-05 RX ADMIN — OXYCODONE HYDROCHLORIDE 2.4 MILLIGRAM(S): 5 TABLET ORAL at 18:45

## 2020-02-05 RX ADMIN — OXYCODONE HYDROCHLORIDE 2.4 MILLIGRAM(S): 5 TABLET ORAL at 19:00

## 2020-02-05 RX ADMIN — Medication 480 MILLIGRAM(S): at 21:55

## 2020-02-05 RX ADMIN — Medication 1.9 MILLIGRAM(S): at 18:09

## 2020-02-05 RX ADMIN — Medication 159 MILLIGRAM(S): at 18:22

## 2020-02-05 RX ADMIN — SODIUM CHLORIDE 3 MILLILITER(S): 9 INJECTION INTRAMUSCULAR; INTRAVENOUS; SUBCUTANEOUS at 21:55

## 2020-02-05 RX ADMIN — OXYCODONE HYDROCHLORIDE 2.4 MILLIGRAM(S): 5 TABLET ORAL at 22:32

## 2020-02-05 RX ADMIN — Medication 4 MILLIGRAM(S): at 21:55

## 2020-02-05 RX ADMIN — SODIUM CHLORIDE 75 MILLILITER(S): 9 INJECTION, SOLUTION INTRAVENOUS at 14:57

## 2020-02-05 RX ADMIN — OXYCODONE HYDROCHLORIDE 2.4 MILLIGRAM(S): 5 TABLET ORAL at 21:55

## 2020-02-05 NOTE — PROGRESS NOTE PEDS - SUBJECTIVE AND OBJECTIVE BOX
NEUROSURGERY POST OP CHECK 02-05-20 @ 14:35    Dx: 20y Female s/p Right lamiforaminotomy costotransversectomy for resection of thoracic nerve sheath tumor. Patient stable in pacu, not complaining of pain, tolerating water PO, MAEx4     MEDICATIONS  (STANDING):  ceFAZolin  IV Intermittent - Peds 1590 milliGRAM(s) IV Intermittent every 8 hours  dexAMETHasone IV Intermittent - Pediatric 4 milliGRAM(s) IV Intermittent every 6 hours  oxyCODONE   Oral Liquid - Peds 2.4 milliGRAM(s) Oral every 4 hours  sodium chloride 0.9%. - Pediatric 1000 milliLiter(s) (75 mL/Hr) IV Continuous <Continuous>    MEDICATIONS  (PRN):  acetaminophen   Oral Liquid - Peds. 480 milliGRAM(s) Oral every 6 hours PRN Temp greater or equal to 38 C (100.4 F), Mild Pain (1 - 3)  diazepam IntraVenous Injection - Peds 1.9 milliGRAM(s) IV Push every 4 hours PRN muscle spasm  HYDROmorphone IV Intermittent - Peds 0.25 milliGRAM(s) IV Intermittent every 15 minutes PRN Moderate Pain (4 - 6)  HYDROmorphone IV Intermittent - Peds 0.5 milliGRAM(s) IV Intermittent every 15 minutes PRN Severe Pain (7 -10)  ibuprofen  Oral Liquid - Peds. 400 milliGRAM(s) Oral every 6 hours PRN Mild Pain (1 - 3)  ondansetron IV Intermittent - Peds 4 milliGRAM(s) IV Intermittent once PRN Nausea and/or Vomiting    HR: 57 (02-05-20 @ 13:45) (57 - 68)  BP: 103/64 (02-05-20 @ 13:30) (94/56 - 103/64)  RR: 12 (02-05-20 @ 13:45) (11 - 14)  SpO2: 100% (02-05-20 @ 13:45) (99% - 100%)    awake, alert, affect appropriate   SMALL x4 with good strength  Incision C/D/I    PLAN:   - floor once pacu criteria met   - pain control   - SQL   - no motrin for 3-5 days   - dex taper

## 2020-02-05 NOTE — ASU PATIENT PROFILE, PEDIATRIC - REASON FOR ADMISSION, PROFILE
Right T9 lanunectomy for resection of neurofibroma, resection of right back mass. possible chest tube.

## 2020-02-05 NOTE — BRIEF OPERATIVE NOTE - NSICDXBRIEFPROCEDURE_GEN_ALL_CORE_FT
PROCEDURES:  Laminectomy for bx and excise of combined extradur-intradur intraspinal tumor 05-Feb-2020 11:55:13  Pedrito Doe

## 2020-02-06 ENCOUNTER — TRANSCRIPTION ENCOUNTER (OUTPATIENT)
Age: 20
End: 2020-02-06

## 2020-02-06 DIAGNOSIS — D49.2 NEOPLASM OF UNSPECIFIED BEHAVIOR OF BONE, SOFT TISSUE, AND SKIN: ICD-10-CM

## 2020-02-06 PROCEDURE — 99233 SBSQ HOSP IP/OBS HIGH 50: CPT

## 2020-02-06 RX ORDER — OXYCODONE HYDROCHLORIDE 5 MG/1
2 TABLET ORAL EVERY 6 HOURS
Refills: 0 | Status: DISCONTINUED | OUTPATIENT
Start: 2020-02-06 | End: 2020-02-06

## 2020-02-06 RX ORDER — DEXAMETHASONE 0.5 MG/5ML
ELIXIR ORAL
Refills: 0 | Status: DISCONTINUED | OUTPATIENT
Start: 2020-02-06 | End: 2020-02-07

## 2020-02-06 RX ORDER — DEXAMETHASONE 0.5 MG/5ML
3 ELIXIR ORAL EVERY 8 HOURS
Refills: 0 | Status: DISCONTINUED | OUTPATIENT
Start: 2020-02-06 | End: 2020-02-07

## 2020-02-06 RX ORDER — DEXAMETHASONE 0.5 MG/5ML
3 ELIXIR ORAL EVERY 8 HOURS
Refills: 0 | Status: DISCONTINUED | OUTPATIENT
Start: 2020-02-06 | End: 2020-02-06

## 2020-02-06 RX ORDER — DEXAMETHASONE 0.5 MG/5ML
1 ELIXIR ORAL EVERY 12 HOURS
Refills: 0 | Status: CANCELLED | OUTPATIENT
Start: 2020-02-10 | End: 2020-02-07

## 2020-02-06 RX ORDER — DEXAMETHASONE 0.5 MG/5ML
1 ELIXIR ORAL EVERY 24 HOURS
Refills: 0 | Status: CANCELLED | OUTPATIENT
Start: 2020-02-13 | End: 2020-02-07

## 2020-02-06 RX ORDER — DIAZEPAM 5 MG
2.4 TABLET ORAL EVERY 8 HOURS
Refills: 0 | Status: DISCONTINUED | OUTPATIENT
Start: 2020-02-06 | End: 2020-02-07

## 2020-02-06 RX ORDER — OXYCODONE HYDROCHLORIDE 5 MG/1
2.4 TABLET ORAL EVERY 4 HOURS
Refills: 0 | Status: DISCONTINUED | OUTPATIENT
Start: 2020-02-06 | End: 2020-02-07

## 2020-02-06 RX ORDER — DEXAMETHASONE 0.5 MG/5ML
2 ELIXIR ORAL EVERY 12 HOURS
Refills: 0 | Status: CANCELLED | OUTPATIENT
Start: 2020-02-08 | End: 2020-02-07

## 2020-02-06 RX ORDER — DEXAMETHASONE 0.5 MG/5ML
3 ELIXIR ORAL
Refills: 0 | Status: DISCONTINUED | OUTPATIENT
Start: 2020-02-06 | End: 2020-02-06

## 2020-02-06 RX ADMIN — OXYCODONE HYDROCHLORIDE 2.4 MILLIGRAM(S): 5 TABLET ORAL at 06:05

## 2020-02-06 RX ADMIN — SODIUM CHLORIDE 3 MILLILITER(S): 9 INJECTION INTRAMUSCULAR; INTRAVENOUS; SUBCUTANEOUS at 14:00

## 2020-02-06 RX ADMIN — SODIUM CHLORIDE 3 MILLILITER(S): 9 INJECTION INTRAMUSCULAR; INTRAVENOUS; SUBCUTANEOUS at 06:05

## 2020-02-06 RX ADMIN — OXYCODONE HYDROCHLORIDE 2.4 MILLIGRAM(S): 5 TABLET ORAL at 01:51

## 2020-02-06 RX ADMIN — OXYCODONE HYDROCHLORIDE 2.4 MILLIGRAM(S): 5 TABLET ORAL at 22:54

## 2020-02-06 RX ADMIN — OXYCODONE HYDROCHLORIDE 2.4 MILLIGRAM(S): 5 TABLET ORAL at 17:30

## 2020-02-06 RX ADMIN — OXYCODONE HYDROCHLORIDE 2.4 MILLIGRAM(S): 5 TABLET ORAL at 16:50

## 2020-02-06 RX ADMIN — Medication 159 MILLIGRAM(S): at 01:07

## 2020-02-06 RX ADMIN — ENOXAPARIN SODIUM 30 MILLIGRAM(S): 100 INJECTION SUBCUTANEOUS at 22:37

## 2020-02-06 RX ADMIN — Medication 3 MILLIGRAM(S): at 23:00

## 2020-02-06 RX ADMIN — Medication 159 MILLIGRAM(S): at 17:03

## 2020-02-06 RX ADMIN — Medication 159 MILLIGRAM(S): at 09:02

## 2020-02-06 RX ADMIN — SODIUM CHLORIDE 3 MILLILITER(S): 9 INJECTION INTRAMUSCULAR; INTRAVENOUS; SUBCUTANEOUS at 22:39

## 2020-02-06 RX ADMIN — Medication 1.9 MILLIGRAM(S): at 07:32

## 2020-02-06 RX ADMIN — Medication 4 MILLIGRAM(S): at 03:05

## 2020-02-06 RX ADMIN — OXYCODONE HYDROCHLORIDE 2.4 MILLIGRAM(S): 5 TABLET ORAL at 06:57

## 2020-02-06 RX ADMIN — Medication 3 MILLIGRAM(S): at 15:00

## 2020-02-06 RX ADMIN — OXYCODONE HYDROCHLORIDE 2.4 MILLIGRAM(S): 5 TABLET ORAL at 02:56

## 2020-02-06 NOTE — PROGRESS NOTE PEDS - ASSESSMENT
20y Female s/p Right lamiforaminotomy costotransversectomy for resection of thoracic nerve sheath tumor POD #1    PLAN:   - dex taper   - pain control   - OOB today  - home tomorrow

## 2020-02-06 NOTE — DISCHARGE NOTE NURSING/CASE MANAGEMENT/SOCIAL WORK - NSDCPNINST_GEN_ALL_CORE
Notify MD if any fever above 100.4F, bleeding, redness, swelling, increased warmth foul odor or drainage at chest wall incision site, pain unrelieved by prescribed medications, decreased appetite, vomiting, skin rash or other complaints.

## 2020-02-06 NOTE — PROGRESS NOTE PEDS - SUBJECTIVE AND OBJECTIVE BOX
ANESTHESIA POSTOP CHECK    20y Female POSTOP DAY 1 S/P resection of R posterior chest wall mass. Pt states doing well, pain is still there but more controlled than before, and denies any N/V. Tolerating food without any issues. Pt denies any additional complaints.     Vital Signs Last 24 Hrs  T(C): 36.7 (06 Feb 2020 15:12), Max: 37 (05 Feb 2020 19:50)  T(F): 98 (06 Feb 2020 15:12), Max: 98.6 (05 Feb 2020 19:50)  HR: 68 (06 Feb 2020 15:12) (55 - 75)  BP: 102/62 (06 Feb 2020 15:12) (91/50 - 110/69)  BP(mean): 76 (06 Feb 2020 15:12) (67 - 79)  RR: 20 (06 Feb 2020 15:12) (16 - 27)  SpO2: 99% (06 Feb 2020 15:12) (96% - 100%)  I&O's Summary    05 Feb 2020 07:01  -  06 Feb 2020 07:00  --------------------------------------------------------  IN: 1804 mL / OUT: 0 mL / NET: 1804 mL        [X ] NO APPARENT ANESTHESIA COMPLICATIONS      Comments:

## 2020-02-06 NOTE — PROGRESS NOTE PEDS - SUBJECTIVE AND OBJECTIVE BOX
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     Patient doing well; no overnight events reported. She states she has some pain in her back but that the pain tolerable with her current pain regimen. She has been ambulating with the assistance of her mother to the bathroom and has been able to get out of bed without too much difficulty. She has voided but has not yet had a bowel movement. She is drinking fluids without difficulty. Denies any fevers, chills, headache, vision changes, chest pain, palpitations, SOB, abdominal pain, N/V, or numbness/tingling in her extremities.     [x ] History per: patient       MEDICATIONS  (STANDING):  ceFAZolin  IV Intermittent - Peds 1590 milliGRAM(s) IV Intermittent every 8 hours  dexAMETHasone  Oral Tab/Cap - Peds   Oral   dexAMETHasone  Oral Tab/Cap - Peds 3 milliGRAM(s) Oral every 8 hours  enoxaparin SubCutaneous Injection - Peds 30 milliGRAM(s) SubCutaneous at bedtime  sodium chloride 0.9% lock flush - Peds 3 milliLiter(s) IV Push every 8 hours    MEDICATIONS  (PRN):  acetaminophen   Oral Liquid - Peds. 480 milliGRAM(s) Oral every 6 hours PRN Temp greater or equal to 38 C (100.4 F), Mild Pain (1 - 3)  diazepam IntraVenous Injection - Peds 1.9 milliGRAM(s) IV Push every 4 hours PRN muscle spasm  oxyCODONE   IR Oral Tab/Cap - Peds 2 milliGRAM(s) Oral every 6 hours PRN Moderate Pain (4 - 6)    Allergies    albuterol (Rash)    Intolerances      Diet: Regular     [x ] There are no updates to the medical, surgical, social or family history unless described:    PATIENT CARE ACCESS DEVICES  [x ] Peripheral IV  [ ] Central Venous Line, Date Placed:		Site/Device:  [ ] PICC, Date Placed:  [ ] Urinary Catheter, Date Placed:  [ ] Necessity of urinary, arterial, and venous catheters discussed    Review of Systems: If not negative (Neg) please elaborate. History Per:   General: [x ] Neg  Pulmonary: [ x] Neg  Cardiac: [x ] Neg  Gastrointestinal: [x ] Neg  Ears, Nose, Throat: [x ] Neg  Renal/Urologic: [x ] Neg  Musculoskeletal: + right upper back pain   Endocrine: [ x] Neg  Hematologic: [x ] Neg  Neurologic: [x ] Neg  Allergy/Immunologic: [x ] Neg  All other systems reviewed and negative [x ]     Vital Signs Last 24 Hrs  T(C): 36.6 (06 Feb 2020 05:45), Max: 37 (05 Feb 2020 19:50)  T(F): 97.8 (06 Feb 2020 05:45), Max: 98.6 (05 Feb 2020 19:50)  HR: 59 (06 Feb 2020 05:45) (55 - 75)  BP: 91/50 (06 Feb 2020 05:45) (91/50 - 110/69)  RR: 20 (06 Feb 2020 05:45) (10 - 27)  SpO2: 99% (06 Feb 2020 05:45) (96% - 100%)  I&O's Summary    05 Feb 2020 07:01  -  06 Feb 2020 07:00  --------------------------------------------------------  IN: 1804 mL / OUT: 0 mL / NET: 1804 mL      Pain Score:  Daily Weight in Gm: 30015 (05 Feb 2020 19:50)  BMI (kg/m2): 18.9 (02-05 @ 19:50), 18.9 (01-31 @ 14:43)    I examined the patient at approximately 8AM  during Family Centered rounds with mother/father present at bedside  VS reviewed, stable.  Gen: patient is awake and alert, smiling, interactive, well appearing, no acute distress  HEENT: NC/AT, pupils equal, responsive, reactive to light and accomodation, no conjunctivitis or scleral icterus; no nasal discharge or congestion  Neck: FROM, supple, no cervical LAD  Chest: CTA b/l, no crackles/wheezes, good air entry, no tachypnea or retractions  CV: regular rate and rhythm, no murmurs   Abd: soft, nontender, nondistended, no HSM appreciated, +BS  : deferred   Back: incision c/d/i   Extrem: No joint effusion or tenderness; FROM of all joints; no deformities or erythema noted. 2+ peripheral pulses, WWP.   Neuro:  Strength in B/L UEs and LEs 5/5; sensation intact and equal in b/l LEs and b/l UEs.       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     1) Nerve Sheath Tumor   -Patient doing well, c/o some pain that is tolerable with current pain regimen.   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     Patient doing well; no overnight events reported. She states she has some pain in her back but that the pain tolerable with her current pain regimen. She has been ambulating with the assistance of her mother to the bathroom and has been able to get out of bed without too much difficulty. She has voided but has not yet had a bowel movement. She is drinking fluids without difficulty. Denies any fevers, chills, headache, vision changes, chest pain, palpitations, SOB, abdominal pain, N/V, or numbness/tingling in her extremities.     [x ] History per: patient       MEDICATIONS  (STANDING):  ceFAZolin  IV Intermittent - Peds 1590 milliGRAM(s) IV Intermittent every 8 hours  dexAMETHasone  Oral Tab/Cap - Peds   Oral   dexAMETHasone  Oral Tab/Cap - Peds 3 milliGRAM(s) Oral every 8 hours  enoxaparin SubCutaneous Injection - Peds 30 milliGRAM(s) SubCutaneous at bedtime  sodium chloride 0.9% lock flush - Peds 3 milliLiter(s) IV Push every 8 hours    MEDICATIONS  (PRN):  acetaminophen   Oral Liquid - Peds. 480 milliGRAM(s) Oral every 6 hours PRN Temp greater or equal to 38 C (100.4 F), Mild Pain (1 - 3)  diazepam IntraVenous Injection - Peds 1.9 milliGRAM(s) IV Push every 4 hours PRN muscle spasm  oxyCODONE   IR Oral Tab/Cap - Peds 2 milliGRAM(s) Oral every 6 hours PRN Moderate Pain (4 - 6)    Allergies    albuterol (Rash)    Intolerances      Diet: Regular     [x ] There are no updates to the medical, surgical, social or family history unless described:    PATIENT CARE ACCESS DEVICES  [x ] Peripheral IV  [ ] Central Venous Line, Date Placed:		Site/Device:  [ ] PICC, Date Placed:  [ ] Urinary Catheter, Date Placed:  [ ] Necessity of urinary, arterial, and venous catheters discussed    Review of Systems: If not negative (Neg) please elaborate. History Per:   General: [x ] Neg  Pulmonary: [ x] Neg  Cardiac: [x ]   Gastrointestinal: [x ] Neg  Ears, Nose, Throat: [x ] Neg  Renal/Urologic: [x ] Neg  Musculoskeletal: + right upper back pain   Endocrine: [ x] Neg  Hematologic: [x ] Neg  Neurologic: [x ] Neg  Allergy/Immunologic: [x ] Neg  All other systems reviewed and negative [x ]     Vital Signs Last 24 Hrs  T(C): 36.6 (06 Feb 2020 05:45), Max: 37 (05 Feb 2020 19:50)  T(F): 97.8 (06 Feb 2020 05:45), Max: 98.6 (05 Feb 2020 19:50)  HR: 59 (06 Feb 2020 05:45) (55 - 75)  BP: 91/50 (06 Feb 2020 05:45) (91/50 - 110/69)  RR: 20 (06 Feb 2020 05:45) (10 - 27)  SpO2: 99% (06 Feb 2020 05:45) (96% - 100%)  I&O's Summary    05 Feb 2020 07:01  -  06 Feb 2020 07:00  --------------------------------------------------------  IN: 1804 mL / OUT: 0 mL / NET: 1804 mL      Pain Score:  Daily Weight in Gm: 12004 (05 Feb 2020 19:50)  BMI (kg/m2): 18.9 (02-05 @ 19:50), 18.9 (01-31 @ 14:43)    I examined the patient at approximately 8AM  during Family Centered rounds with mother/father present at bedside  VS reviewed, stable.  Gen: patient is awake and alert, smiling, interactive, well appearing, no acute distress  HEENT: NC/AT, pupils equal, responsive, reactive to light and accomodation, no conjunctivitis or scleral icterus; no nasal discharge or congestion  Neck: FROM, supple, no cervical LAD  Chest: CTA b/l, no crackles/wheezes, good air entry, no tachypnea or retractions  CV: regular rate and rhythm, no murmurs   Abd: soft, nontender, nondistended, no HSM appreciated, +BS  : deferred   Back: incision c/d/i   Extrem: No joint effusion or tenderness; FROM of all joints; no deformities or erythema noted. 2+ peripheral pulses, WWP.   Neuro:  Strength in B/L UEs and LEs 5/5; sensation intact and equal in b/l LEs and b/l UEs.       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     1) Nerve Sheath Tumor   -Patient doing well, c/o some pain that is tolerable with current pain regimen.   -Continue Dexa taper   -Continue pain control with Tylenol PRN, Ibuprofen PRN, and Oxycodone q 4h   -Continue Valium PRN for muscle spasms   -Encourage ambulation   -Encourage incentive spirometry   -Possible d/c home tomorrow as per neurosurgery team     2) DVT prophylaxis   -Continue Lovenox 30 mg subQ at bedtime   -SCDS   -Encourage ambulation

## 2020-02-06 NOTE — PROGRESS NOTE PEDS - SUBJECTIVE AND OBJECTIVE BOX
NEUROSURGERY NOTE   JAYLIN PARADA / [id number] / 02-06-20 @ 08:16    PAST 24hr EVENTS: 20y Female s/p Right lamiforaminotomy costotransversectomy for resection of thoracic nerve sheath tumor POD #1. No issues overnight    PHYSICAL EXAM:   Vital Signs Last 24 Hrs  T(C): 36.6 (06 Feb 2020 05:45), Max: 37 (05 Feb 2020 19:50)  T(F): 97.8 (06 Feb 2020 05:45), Max: 98.6 (05 Feb 2020 19:50)  HR: 59 (06 Feb 2020 05:45) (55 - 75)  BP: 91/50 (06 Feb 2020 05:45) (91/50 - 110/69)  BP(mean): 79 (05 Feb 2020 19:00) (65 - 79)  RR: 20 (06 Feb 2020 05:45) (10 - 27)  SpO2: 99% (06 Feb 2020 05:45) (96% - 100%)    Awake, Alert, Affect appropriate  PERRL, EOMI  MAEx4 w/ good strength  No drift  Incision/wound C/D/I    I&O's Summary    05 Feb 2020 07:01  -  06 Feb 2020 07:00  --------------------------------------------------------  IN: 1804 mL / OUT: 0 mL / NET: 1804 mL    MEDICATIONS  (STANDING):  ceFAZolin  IV Intermittent - Peds 1590 milliGRAM(s) IV Intermittent every 8 hours  dexAMETHasone  Oral Tab/Cap - Peds   Oral   dexAMETHasone  Oral Tab/Cap - Peds 3 milliGRAM(s) Oral every 8 hours  dexAMETHasone IV Intermittent - Pediatric 4 milliGRAM(s) IV Intermittent every 6 hours  enoxaparin SubCutaneous Injection - Peds 30 milliGRAM(s) SubCutaneous at bedtime  oxyCODONE   Oral Liquid - Peds 2.4 milliGRAM(s) Oral every 4 hours  sodium chloride 0.9% lock flush - Peds 3 milliLiter(s) IV Push every 8 hours    MEDICATIONS  (PRN):  acetaminophen   Oral Liquid - Peds. 480 milliGRAM(s) Oral every 6 hours PRN Temp greater or equal to 38 C (100.4 F), Mild Pain (1 - 3)  diazepam IntraVenous Injection - Peds 1.9 milliGRAM(s) IV Push every 4 hours PRN muscle spasm  ibuprofen  Oral Liquid - Peds. 400 milliGRAM(s) Oral every 6 hours PRN Mild Pain (1 - 3)

## 2020-02-06 NOTE — DISCHARGE NOTE NURSING/CASE MANAGEMENT/SOCIAL WORK - PATIENT PORTAL LINK FT
You can access the FollowMyHealth Patient Portal offered by [facility] by registering at the following website: [email or web address]. By joining [facility]’s FollowMyHealth portal, you will also be able to view your health information using other applications (apps) compatible with our system.

## 2020-02-06 NOTE — PROGRESS NOTE PEDS - ATTENDING COMMENTS
ATTENDING STATEMENT:  Family Centered Rounds completed with parents and nursing.   I have read and agree with this Progress Note.  I examined the patient this morning and agree with above resident physical exam, with edits made where appropriate.  I was physically present for the evaluation and management services provided.   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   Continue decadron taper, pain control, cefazolin. Per Nsx will be started on lovenox for DVT ppx (discussed with h/o, no contraindication despite mildly decreased factor levels).  Creatinine stable (pt has a solitary kidney) from baseline.  Anticipated Discharge Date:  [ ] Social Work needs:  [ ] Case management needs:  [ ] Other discharge needs:        [ ] Reviewed lab results  [ ] Reviewed Radiology  [ ] Spoke with parents/guardian  [ ] Spoke with consultant    [ ] 35 minutes or more was spent on the total encounter with more than 50% of the visit spent on counseling and / or coordination of care    Karuna Scott MD  #[id number] ATTENDING STATEMENT:  Family Centered Rounds completed with parents and nursing.   I have read and agree with this Progress Note.  I examined the patient this morning and agree with above resident physical exam, with edits made where appropriate.  I was physically present for the evaluation and management services provided.   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   Continue decadron taper, pain control, cefazolin. Per Nsx will be started on lovenox for DVT ppx (discussed with h/o, no contraindication despite mildly decreased factor levels).  Creatinine stable (pt has a solitary kidney) from baseline.  Will avoid nephrotoxic meds (all current medications ok, GFR approx 77).     Anticipated Discharge Date:  [ ] Social Work needs:  [ ] Case management needs:  [ ] Other discharge needs:        [ x] Reviewed lab results  [ ] Reviewed Radiology  [x] Spoke with parents/guardian  [x ] Spoke with consultant    [ ] 35 minutes or more was spent on the total encounter with more than 50% of the visit spent on counseling and / or coordination of care    Karuna Scott MD  #[id number] ATTENDING STATEMENT:  Family Centered Rounds completed with parents and nursing.   I have read and agree with this Progress Note.  I examined the patient this morning and agree with above resident physical exam, with edits made where appropriate.  I was physically present for the evaluation and management services provided.   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   Continue decadron taper, pain control, cefazolin. Per Nsx will be started on lovenox for DVT ppx (discussed with h/o, no contraindication despite mildly decreased factor levels).  Creatinine stable (pt has a solitary kidney) from baseline.  Will avoid nephrotoxic meds (all current medications ok, GFR approx 77).     Anticipated Discharge Date:  [ ] Social Work needs:  [ ] Case management needs:  [ ] Other discharge needs:        [ x] Reviewed lab results  [ ] Reviewed Radiology  [x] Spoke with parents/guardian  [x ] Spoke with consultant    [ ] 35 minutes or more was spent on the total encounter with more than 50% of the visit spent on counseling and / or coordination of care    Karuna Scott MD  #[id number]

## 2020-02-07 ENCOUNTER — TRANSCRIPTION ENCOUNTER (OUTPATIENT)
Age: 20
End: 2020-02-07

## 2020-02-07 VITALS
DIASTOLIC BLOOD PRESSURE: 59 MMHG | OXYGEN SATURATION: 98 % | RESPIRATION RATE: 20 BRPM | HEART RATE: 69 BPM | SYSTOLIC BLOOD PRESSURE: 100 MMHG | TEMPERATURE: 98 F

## 2020-02-07 RX ORDER — OXYCODONE HYDROCHLORIDE 5 MG/1
2.4 TABLET ORAL
Qty: 44 | Refills: 0
Start: 2020-02-07 | End: 2020-02-09

## 2020-02-07 RX ORDER — DIAZEPAM 5 MG
2.4 TABLET ORAL
Qty: 36 | Refills: 0
Start: 2020-02-07 | End: 2020-02-11

## 2020-02-07 RX ORDER — ACETAMINOPHEN 500 MG
15 TABLET ORAL
Qty: 0 | Refills: 0 | DISCHARGE
Start: 2020-02-07

## 2020-02-07 RX ORDER — DEXAMETHASONE 0.5 MG/5ML
3 ELIXIR ORAL
Qty: 22 | Refills: 0
Start: 2020-02-07

## 2020-02-07 RX ADMIN — Medication 2.4 MILLIGRAM(S): at 11:10

## 2020-02-07 RX ADMIN — Medication 3 MILLIGRAM(S): at 07:00

## 2020-02-07 RX ADMIN — OXYCODONE HYDROCHLORIDE 2.4 MILLIGRAM(S): 5 TABLET ORAL at 00:00

## 2020-02-07 RX ADMIN — SODIUM CHLORIDE 3 MILLILITER(S): 9 INJECTION INTRAMUSCULAR; INTRAVENOUS; SUBCUTANEOUS at 06:15

## 2020-02-07 RX ADMIN — Medication 480 MILLIGRAM(S): at 11:10

## 2020-02-07 RX ADMIN — Medication 159 MILLIGRAM(S): at 01:00

## 2020-02-07 NOTE — DISCHARGE NOTE PROVIDER - NSDCFUADDINST_GEN_ALL_CORE_FT
1. Remove top surgical dressing on post operative day 3 unless it was removed by the surgical team prior to your discharge. Incision should be left uncovered after day 3.   2. Begin showering with shampoo on post operative day 4. Avoid long soaks and do not submerge incision in bathtub. Regular shower only and allow soap and water to run over the incision. Pat incision area dry with clean towel- do not scrub. Please shower regularly to ensure incision stays clean to avoid post operative infections.   3. Notify your surgeon if you notice increased redness, drainage or you notice incision area opening.   4. Return to ER immediately for high fevers, severe headache, vomiting, lethargy or  weakness  5. Please call your neurosurgeon following discharge to make follow up appointment in 1 week after discharge unless otherwise specified. See Contact information below.   6. Prescription Post operative medication, if applicable, are sent to [facility] (unless another pharmacy specified)- [facility] is located in [facility] [employer]. All post operative prescriptions should be picked up before departing the hospital.  7. Ambulate as tolerate. Continue with all "activities of daily living." Avoid strenuous activity or lifting more than 10 pounds until cleared for additional activity at your follow up appointment.  8. Do not return to work or school until cleared by your neurosurgeon at your follow up visit unless specified to you during your hospital stay

## 2020-02-07 NOTE — DISCHARGE NOTE PROVIDER - NSDCMRMEDTOKEN_GEN_ALL_CORE_FT
acetaminophen 160 mg/5 mL oral suspension: 15 milliliter(s) orally every 6 hours, As needed, Temp greater or equal to 38 C (100.4 F), Mild Pain (1 - 3)  dexamethasone 1 mg oral tablet: 3 PO q8 hrs x 1D, 2 PO q 8 hrs x 1D, 2 PO q12 hrs x 1 D, 1 PO q 12 hrs x 1D, 1 PO daily x 1 D then stop  diazePAM 5 mg/5 mL oral solution: 2.4 milliliter(s) orally every 8 hours, As needed, spasm MDD:7.2mL  gabapentin: 700mg Qpm  300mg Qam.   oxyCODONE 5 mg/5 mL oral solution: 2.4 milliliter(s) orally every 4 hours, As needed, Moderate Pain (4 - 6) MDD:14.4 mL

## 2020-02-07 NOTE — DISCHARGE NOTE PROVIDER - NSDCCPCAREPLAN_GEN_ALL_CORE_FT
PRINCIPAL DISCHARGE DIAGNOSIS  Diagnosis: Nerve sheath tumor  Assessment and Plan of Treatment: Nerve sheath tumor

## 2020-02-07 NOTE — DISCHARGE NOTE PROVIDER - HOSPITAL COURSE
HPI:    19yo F with PMH significant for Neurofibromatosis Type 1, optic glioma, solitary kidney and albuterol allergy.      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     Cate is now scheduled for combined surgical intervention for resection of a neurofibroma to her back which has been causing pain for the past year.         No h/o anesthetic or surgical complications with prior procedures.         Denies any recent acute illness in the past two weeks. (31 Jan 2020 11:23)    Patient was taken to the OR On 2/5 for a R T9 foraminotomy, resection of rib and resection of neurofibroma. She tolerated procedure well and post op pain was well controlled. She has been OOB, tolerating diet. Stable for d/c home today.

## 2020-02-07 NOTE — DISCHARGE NOTE PROVIDER - CARE PROVIDER_API CALL
Paco Larry)  Pediatrics Neurosurgery  [address] [place] [address], [address]  [place] [address]  Phone: [phone]  Fax: [phone]  Follow Up Time: 1 week

## 2020-02-07 NOTE — PROGRESS NOTE PEDS - SUBJECTIVE AND OBJECTIVE BOX
HPI:  19yo F with PMH significant for Neurofibromatosis Type 1, optic glioma, solitary kidney and albuterol allergy.    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   Cate is now scheduled for combined surgical intervention for resection of a neurofibroma to her back which has been causing pain for the past year.     No h/o anesthetic or surgical complications with prior procedures.     Denies any recent acute illness in the past two weeks. (31 Jan 2020 11:23)      OVERNIGHT EVENTS:  Doing well. No overnight events.     Vital Signs Last 24 Hrs  T(C): 36.9 (07 Feb 2020 05:15), Max: 37.1 (06 Feb 2020 18:10)  T(F): 98.4 (07 Feb 2020 05:15), Max: 98.7 (06 Feb 2020 18:10)  HR: 69 (07 Feb 2020 05:15) (60 - 72)  BP: 100/59 (07 Feb 2020 05:15) (93/54 - 110/65)  BP(mean): 66 (06 Feb 2020 21:50) (66 - 76)  RR: 20 (07 Feb 2020 05:15) (18 - 20)  SpO2: 98% (07 Feb 2020 05:15) (98% - 100%)    I&O's Summary      PHYSICAL EXAM:  Mental Status: Awake, Alert, Affect appropriate  PERRL, EOMI  Motor:  MAEx4 w/ good strength  No drift  Incision/Wound: c/d/i    TUBES/LINES:      DIET:    [ ] Regular    LABS:        Allergies    albuterol (Rash)        MEDICATIONS:  Antibiotics:  ceFAZolin  IV Intermittent - Peds 1590 milliGRAM(s) IV Intermittent every 8 hours    Neuro:  acetaminophen   Oral Liquid - Peds. 480 milliGRAM(s) Oral every 6 hours PRN  diazepam  Oral Liquid - Peds 2.4 milliGRAM(s) Oral every 8 hours PRN  oxyCODONE   Oral Liquid - Peds 2.4 milliGRAM(s) Oral every 4 hours PRN    Anticoagulation  enoxaparin SubCutaneous Injection - Peds 30 milliGRAM(s) SubCutaneous at bedtime    OTHER:  dexAMETHasone  Oral Tab/Cap - Peds   Oral   dexAMETHasone  Oral Tab/Cap - Peds 3 milliGRAM(s) Oral every 8 hours    IVF:  sodium chloride 0.9% lock flush - Peds 3 milliLiter(s) IV Push every 8 hours          RADIOLOGY & ADDITIONAL TESTS:

## 2020-02-07 NOTE — DISCHARGE NOTE PROVIDER - NSDCCPTREATMENT_GEN_ALL_CORE_FT
PRINCIPAL PROCEDURE  Procedure: Laminectomy for bx and excise of combined extradur-intradur intraspinal tumor  Findings and Treatment:

## 2020-09-16 ENCOUNTER — APPOINTMENT (OUTPATIENT)
Dept: MRI IMAGING | Facility: IMAGING CENTER | Age: 20
End: 2020-09-16
Payer: COMMERCIAL

## 2020-09-16 ENCOUNTER — OUTPATIENT (OUTPATIENT)
Dept: OUTPATIENT SERVICES | Facility: HOSPITAL | Age: 20
LOS: 1 days | End: 2020-09-16
Payer: COMMERCIAL

## 2020-09-16 DIAGNOSIS — D36.10 BENIGN NEOPLASM OF PERIPHERAL NERVES AND AUTONOMIC NERVOUS SYSTEM, UNSPECIFIED: ICD-10-CM

## 2020-09-16 DIAGNOSIS — D36.10 BENIGN NEOPLASM OF PERIPHERAL NERVES AND AUTONOMIC NERVOUS SYSTEM, UNSPECIFIED: Chronic | ICD-10-CM

## 2020-09-16 DIAGNOSIS — Z78.9 OTHER SPECIFIED HEALTH STATUS: Chronic | ICD-10-CM

## 2020-09-16 PROCEDURE — 72157 MRI CHEST SPINE W/O & W/DYE: CPT | Mod: 26

## 2020-09-16 PROCEDURE — 72158 MRI LUMBAR SPINE W/O & W/DYE: CPT | Mod: 26

## 2020-09-16 PROCEDURE — 72158 MRI LUMBAR SPINE W/O & W/DYE: CPT

## 2020-09-16 PROCEDURE — 72157 MRI CHEST SPINE W/O & W/DYE: CPT

## 2020-09-16 PROCEDURE — A9585: CPT

## 2020-09-29 ENCOUNTER — NON-APPOINTMENT (OUTPATIENT)
Age: 20
End: 2020-09-29

## 2020-11-05 ENCOUNTER — OUTPATIENT (OUTPATIENT)
Dept: OUTPATIENT SERVICES | Age: 20
LOS: 1 days | End: 2020-11-05

## 2020-11-05 VITALS
HEIGHT: 62.36 IN | HEART RATE: 83 BPM | SYSTOLIC BLOOD PRESSURE: 99 MMHG | DIASTOLIC BLOOD PRESSURE: 63 MMHG | WEIGHT: 102.74 LBS | RESPIRATION RATE: 17 BRPM | TEMPERATURE: 97 F | OXYGEN SATURATION: 99 %

## 2020-11-05 DIAGNOSIS — Z78.9 OTHER SPECIFIED HEALTH STATUS: Chronic | ICD-10-CM

## 2020-11-05 DIAGNOSIS — D36.10 BENIGN NEOPLASM OF PERIPHERAL NERVES AND AUTONOMIC NERVOUS SYSTEM, UNSPECIFIED: Chronic | ICD-10-CM

## 2020-11-05 DIAGNOSIS — Z86.69 PERSONAL HISTORY OF OTHER DISEASES OF THE NERVOUS SYSTEM AND SENSE ORGANS: ICD-10-CM

## 2020-11-05 DIAGNOSIS — D49.7 NEOPLASM OF UNSPECIFIED BEHAVIOR OF ENDOCRINE GLANDS AND OTHER PARTS OF NERVOUS SYSTEM: ICD-10-CM

## 2020-11-05 LAB
ANION GAP SERPL CALC-SCNC: 9 MMO/L — SIGNIFICANT CHANGE UP (ref 7–14)
BLD GP AB SCN SERPL QL: NEGATIVE — SIGNIFICANT CHANGE UP
BUN SERPL-MCNC: 23 MG/DL — SIGNIFICANT CHANGE UP (ref 7–23)
CALCIUM SERPL-MCNC: 9.2 MG/DL — SIGNIFICANT CHANGE UP (ref 8.4–10.5)
CHLORIDE SERPL-SCNC: 104 MMOL/L — SIGNIFICANT CHANGE UP (ref 98–107)
CO2 SERPL-SCNC: 26 MMOL/L — SIGNIFICANT CHANGE UP (ref 22–31)
CREAT SERPL-MCNC: 1.06 MG/DL — SIGNIFICANT CHANGE UP (ref 0.5–1.3)
GLUCOSE SERPL-MCNC: 85 MG/DL — SIGNIFICANT CHANGE UP (ref 70–99)
HCG SERPL-ACNC: < 5 MIU/ML — SIGNIFICANT CHANGE UP
HCT VFR BLD CALC: 37.1 % — SIGNIFICANT CHANGE UP (ref 34.5–45)
HGB BLD-MCNC: 12.7 G/DL — SIGNIFICANT CHANGE UP (ref 11.5–15.5)
MCHC RBC-ENTMCNC: 32.6 PG — SIGNIFICANT CHANGE UP (ref 27–34)
MCHC RBC-ENTMCNC: 34.2 % — SIGNIFICANT CHANGE UP (ref 32–36)
MCV RBC AUTO: 95.4 FL — SIGNIFICANT CHANGE UP (ref 80–100)
NRBC # FLD: 0 K/UL — SIGNIFICANT CHANGE UP (ref 0–0)
PLATELET # BLD AUTO: 217 K/UL — SIGNIFICANT CHANGE UP (ref 150–400)
PMV BLD: 9.6 FL — SIGNIFICANT CHANGE UP (ref 7–13)
POTASSIUM SERPL-MCNC: 3.7 MMOL/L — SIGNIFICANT CHANGE UP (ref 3.5–5.3)
POTASSIUM SERPL-SCNC: 3.7 MMOL/L — SIGNIFICANT CHANGE UP (ref 3.5–5.3)
RBC # BLD: 3.89 M/UL — SIGNIFICANT CHANGE UP (ref 3.8–5.2)
RBC # FLD: 11.5 % — SIGNIFICANT CHANGE UP (ref 10.3–14.5)
RH IG SCN BLD-IMP: POSITIVE — SIGNIFICANT CHANGE UP
SODIUM SERPL-SCNC: 139 MMOL/L — SIGNIFICANT CHANGE UP (ref 135–145)
WBC # BLD: 7.74 K/UL — SIGNIFICANT CHANGE UP (ref 3.8–10.5)
WBC # FLD AUTO: 7.74 K/UL — SIGNIFICANT CHANGE UP (ref 3.8–10.5)

## 2020-11-05 RX ORDER — GABAPENTIN 400 MG/1
0 CAPSULE ORAL
Qty: 0 | Refills: 0 | DISCHARGE

## 2020-11-05 NOTE — H&P PST ADULT - MUSCULOSKELETAL COMMENTS
large neurofibroma to right side of back. reports constant pain of 8/10 that is unchanged despite gabapentin. Denies any bowel or bladder function changes. s/p excision of large neurofibroma to right side of back in Feb 2020. No c/o recurrent neurofibroma to lumbar region which she reports intermittent pain. Denies any bowel or bladder function changes. s/p excision of large neurofibroma to right upper region of back in Feb 2020. Now c/o recurrent neurofibroma to thoracic region which has increased in size, she reports intermittent pain. See MRI results below from 9-16-20.  Denies any bowel or bladder function changes.

## 2020-11-05 NOTE — H&P PST ADULT - NSANTHOSAYNRD_GEN_A_CORE
No. GENIA screening performed.  STOP BANG Legend: 0-2 = LOW Risk; 3-4 = INTERMEDIATE Risk; 5-8 = HIGH Risk

## 2020-11-05 NOTE — H&P PST ADULT - GENITOURINARY COMMENTS
s/p right nephrectomy and adrenalectomy, see HPI. s/p right nephrectomy and adrenalectomy, currently does not follow with nephrology.  See HPI.

## 2020-11-05 NOTE — H&P PST ADULT - NEUROLOGICAL COMMENTS
follows with Dr. Guillen. Most recent consult attached. 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 follows with Dr. Guillen, annually. Most recent consult attached. 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

## 2020-11-05 NOTE — H&P PST ADULT - REASON FOR ADMISSION
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 Larry at [facility] [place].

## 2020-11-05 NOTE — H&P PST ADULT - HEMATOLOGY/LYMPHATICS COMMENTS
Hem/Onc PST form completed and attached. Survivorship program. Hem/Onc PST form completed and attached. Survivorship program x 10 years

## 2020-11-05 NOTE — H&P PST ADULT - RADIOLOGY RESULTS AND INTERPRETATION
Echocardiogram 2-3-20  Conclusions:  1. Normal mitral valve. Minimal mitral regurgitation.  2. Normal trileaflet aortic valve. No aortic valve  regurgitation seen.  3. Normal left ventricular systolic function. No segmental  wall motion abnormalities.  4. Normal diastolic function  5. Normal right ventricular size and function.  *** Compared with echocardiogram of 3/3/2017, no  significant changes noted. MRI spine 9-16-20    Echocardiogram 2-3-20  Conclusions:  1. Normal mitral valve. Minimal mitral regurgitation.  2. Normal trileaflet aortic valve. No aortic valve  regurgitation seen.  3. Normal left ventricular systolic function. No segmental  wall motion abnormalities.  4. Normal diastolic function  5. Normal right ventricular size and function.  *** Compared with echocardiogram of 3/3/2017, no  significant changes noted.

## 2020-11-05 NOTE — H&P PST ADULT - OTHER CARE PROVIDERS
Neurologist: Dr. Guillen; Hem/Onc: Cata Anders NP & Wan Cerda MD; Cardiologist: So Doshi MD; Nephrologist: Martina Garcia Neurologist: Dr. Guillen; Hem/Onc: Cata Anders NP & Wan Cerda MD; Cardiologist: So Doshi MD

## 2020-11-05 NOTE — H&P PST ADULT - OPHTHALMOLOGIC COMMENTS
h/o optic glioma, followed by MRI imaging q6months. no visual impairment. h/o optic glioma, followed by MRI imaging q6months, most recent 9-16-20 no visual impairment.

## 2020-11-05 NOTE — H&P PST ADULT - ATTENDING COMMENTS
I explained the r/b/a of thoracic laminectomy resection of spinal cord tumor.  risk include but not limited to bleeding infection stroke paralyis death, numbness tingling, inablility to walk, bowel or bladder dysfunction, need for further surgery or adjuvant treatment.

## 2020-11-05 NOTE — H&P PST ADULT - EKG AND INTERPRETATION
Echocardiogram 2-3-20  Conclusions:  1. Normal mitral valve. Minimal mitral regurgitation.  2. Normal trileaflet aortic valve. No aortic valve  regurgitation seen.  3. Normal left ventricular systolic function. No segmental  wall motion abnormalities.  4. Normal diastolic function  5. Normal right ventricular size and function.  *** Compared with echocardiogram of 3/3/2017, no  significant changes noted.

## 2020-11-05 NOTE — H&P PST ADULT - ASSESSMENT
Pt appears well.  No evidence of acute illness or infection.  CBC, BMP, HCG, T&S sent as indicated   CHG wipes provided with verbal and written instruction  COVID testing scheduled for 11/15/20  Child life prep during our visit.  Instructed to notify PCP and surgeon if s/s of infection develop prior to procedure.

## 2020-11-05 NOTE — H&P PST ADULT - BACK COMMENTS
+neurofibroma to right side of back. 3-4 inch well healed surgical scar noted to right mid back region s/p excision

## 2020-11-05 NOTE — H&P PST ADULT - NSICDXPASTSURGICALHX_GEN_ALL_CORE_FT
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

## 2020-11-05 NOTE — H&P PST ADULT - NS PRO REFERRAL CMGT
Pt. verbalized developmentally appropriate understanding of surgery. Pt. appeared to be coping appropriately. Parental support and preparation was provided. Psychological preparation for procedure was provided through pictures and medical materials.

## 2020-11-05 NOTE — H&P PST ADULT - GASTROINTESTINAL COMMENTS
healed surgical scar (right flank extending to abdomen), +pierced naval. Hx of liver biopsy in 2018 d/t possibility of neurofibromas on liver which was found to be negative.  Denies any concerns or follow up since. Hx of liver biopsy in 2019 d/t multiple lesions found on liver which were consistent with focal nodular hyperplasia.  Denies any concerns or follow up since.

## 2020-11-05 NOTE — H&P PST ADULT - HISTORY OF PRESENT ILLNESS
21yo F with PMH significant for Neurofibromatosis Type 1, optic glioma, solitary kidney and albuterol allergy.    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   Cate is now scheduled for combined surgical intervention for resection of a neurofibroma to her back which has been causing pain for the past year.     No h/o anesthetic or surgical complications with prior procedures.     Denies any recent acute illness in the past two weeks. 19yo F with PMH significant for Neurofibromatosis Type 1, optic glioma, solitary kidney and albuterol allergy.    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   Pt s/p resection of a neurofibroma to her back in Feb 2020, now scheduled for subsequent resection due to new neurofibroma which has grown in lumbar region on 11/18/20.     No h/o anesthetic or surgical complications with prior procedures.     Denies any recent acute illness in the past two weeks. 19yo F with PMH significant for Neurofibromatosis Type 1, optic glioma, solitary kidney and albuterol allergy.    She is s/p right adrenalectomy and nephrectomy due to nerve sheath tumor - completed chemotherapy and radiation treatment in March 2011, s/p excision of right occipital plexiform neurofibroma in 2014 and s/p resection of a neurofibroma to her back in Feb 2020, now scheduled for subsequent resection due to new neurofibroma which has grown in lumbar region on 11/18/20.     No h/o anesthetic or surgical complications with prior procedures.     Denies any recent acute illness in the past two weeks.

## 2020-11-05 NOTE — H&P PST ADULT - VTE RISK COMMENTS
Maternal great grandfather h/o thrombus (DVT), no other family members affected.  Consider use of Venodyne boots.

## 2020-11-05 NOTE — H&P PST ADULT - CARDIOVASCULAR COMMENTS
Most recent Cardiac evaluation Feb 2020.  Echocardiogram was completed with no significant changes noted from 2017.  Denies any s/s of cardiac origin.

## 2020-11-05 NOTE — H&P PST ADULT - NSICDXPROBLEM_GEN_ALL_CORE_FT
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 [facility] [place]

## 2020-11-05 NOTE — H&P PST ADULT - LAB RESULTS AND INTERPRETATION
MRI Spine 9-16-20  IMPRESSION:  1. Post operative changes at the site of resection of the peripheral nerve sheath tumor identified at the right neural foraminal T10-11 level when compared with the prior 10/29/2019 without definitive evidence of recurrence though close interval follow-up recommended  2. Increased size to the intramedullary lesion at the T10-11 level with the measurements given above.  3. Multiple retroperitoneal lesions as described previously the largest of which is anterior to the left psoas and appears to measure slightly larger compared with the prior  4. Foraminal nerve sheath tumor on the left at L2-3 stable in size. No encroachment upon the thecal sac appreciated

## 2020-11-15 ENCOUNTER — APPOINTMENT (OUTPATIENT)
Dept: DISASTER EMERGENCY | Facility: CLINIC | Age: 20
End: 2020-11-15

## 2020-11-16 LAB — SARS-COV-2 N GENE NPH QL NAA+PROBE: NOT DETECTED

## 2020-11-17 ENCOUNTER — TRANSCRIPTION ENCOUNTER (OUTPATIENT)
Age: 20
End: 2020-11-17

## 2020-11-18 ENCOUNTER — INPATIENT (INPATIENT)
Age: 20
LOS: 5 days | Discharge: NURSING, REHAB OR LONG-TERM CARE | End: 2020-11-24
Attending: NEUROLOGICAL SURGERY | Admitting: NEUROLOGICAL SURGERY
Payer: COMMERCIAL

## 2020-11-18 ENCOUNTER — TRANSCRIPTION ENCOUNTER (OUTPATIENT)
Age: 20
End: 2020-11-18

## 2020-11-18 ENCOUNTER — RESULT REVIEW (OUTPATIENT)
Age: 20
End: 2020-11-18

## 2020-11-18 VITALS
DIASTOLIC BLOOD PRESSURE: 60 MMHG | SYSTOLIC BLOOD PRESSURE: 94 MMHG | WEIGHT: 102.74 LBS | HEART RATE: 88 BPM | TEMPERATURE: 98 F | RESPIRATION RATE: 16 BRPM | OXYGEN SATURATION: 100 % | HEIGHT: 62.36 IN

## 2020-11-18 DIAGNOSIS — G95.9 DISEASE OF SPINAL CORD, UNSPECIFIED: ICD-10-CM

## 2020-11-18 DIAGNOSIS — Z78.9 OTHER SPECIFIED HEALTH STATUS: Chronic | ICD-10-CM

## 2020-11-18 DIAGNOSIS — D49.7 NEOPLASM OF UNSPECIFIED BEHAVIOR OF ENDOCRINE GLANDS AND OTHER PARTS OF NERVOUS SYSTEM: ICD-10-CM

## 2020-11-18 DIAGNOSIS — D36.10 BENIGN NEOPLASM OF PERIPHERAL NERVES AND AUTONOMIC NERVOUS SYSTEM, UNSPECIFIED: Chronic | ICD-10-CM

## 2020-11-18 LAB
BASE EXCESS BLDA CALC-SCNC: -4.7 MMOL/L — SIGNIFICANT CHANGE UP
BASE EXCESS BLDA CALC-SCNC: -5.1 MMOL/L — SIGNIFICANT CHANGE UP
CA-I BLDA-SCNC: 1.21 MMOL/L — SIGNIFICANT CHANGE UP (ref 1.15–1.29)
CA-I BLDA-SCNC: 1.24 MMOL/L — SIGNIFICANT CHANGE UP (ref 1.15–1.29)
GLUCOSE BLDA-MCNC: 87 MG/DL — SIGNIFICANT CHANGE UP (ref 70–99)
GLUCOSE BLDA-MCNC: 95 MG/DL — SIGNIFICANT CHANGE UP (ref 70–99)
HCG UR QL: NEGATIVE — SIGNIFICANT CHANGE UP
HCO3 BLDA-SCNC: 20 MMOL/L — LOW (ref 22–26)
HCO3 BLDA-SCNC: 21 MMOL/L — LOW (ref 22–26)
HCT VFR BLDA CALC: 33 % — LOW (ref 34.5–46.5)
HCT VFR BLDA CALC: 34.1 % — LOW (ref 34.5–46.5)
HGB BLDA-MCNC: 10.7 G/DL — LOW (ref 11.5–15.5)
HGB BLDA-MCNC: 11.1 G/DL — LOW (ref 11.5–15.5)
PCO2 BLDA: 35 MMHG — SIGNIFICANT CHANGE UP (ref 32–48)
PCO2 BLDA: 36 MMHG — SIGNIFICANT CHANGE UP (ref 32–48)
PH BLDA: 7.3 PH — LOW (ref 7.35–7.45)
PH BLDA: 7.36 PH — SIGNIFICANT CHANGE UP (ref 7.35–7.45)
PO2 BLDA: 290 MMHG — HIGH (ref 83–108)
PO2 BLDA: 336 MMHG — HIGH (ref 83–108)
POTASSIUM BLDA-SCNC: 3.4 MMOL/L — SIGNIFICANT CHANGE UP (ref 3.4–4.5)
POTASSIUM BLDA-SCNC: 3.9 MMOL/L — SIGNIFICANT CHANGE UP (ref 3.4–4.5)
SAO2 % BLDA: 99.8 % — HIGH (ref 95–99)
SAO2 % BLDA: 99.9 % — HIGH (ref 95–99)
SODIUM BLDA-SCNC: 137 MMOL/L — SIGNIFICANT CHANGE UP (ref 136–146)
SODIUM BLDA-SCNC: 138 MMOL/L — SIGNIFICANT CHANGE UP (ref 136–146)

## 2020-11-18 PROCEDURE — 88305 TISSUE EXAM BY PATHOLOGIST: CPT | Mod: 26

## 2020-11-18 PROCEDURE — 88342 IMHCHEM/IMCYTCHM 1ST ANTB: CPT | Mod: 26,59

## 2020-11-18 PROCEDURE — 99291 CRITICAL CARE FIRST HOUR: CPT

## 2020-11-18 PROCEDURE — 88307 TISSUE EXAM BY PATHOLOGIST: CPT | Mod: 26

## 2020-11-18 PROCEDURE — 88334 PATH CONSLTJ SURG CYTO XM EA: CPT | Mod: 26

## 2020-11-18 PROCEDURE — 88360 TUMOR IMMUNOHISTOCHEM/MANUAL: CPT | Mod: 26

## 2020-11-18 PROCEDURE — 88341 IMHCHEM/IMCYTCHM EA ADD ANTB: CPT | Mod: 26,59

## 2020-11-18 PROCEDURE — 88333 PATH CONSLTJ SURG CYTO XM 1: CPT | Mod: 26

## 2020-11-18 RX ORDER — GABAPENTIN 400 MG/1
700 CAPSULE ORAL
Qty: 0 | Refills: 0 | DISCHARGE

## 2020-11-18 RX ORDER — NOREPINEPHRINE BITARTRATE/D5W 8 MG/250ML
0.03 PLASTIC BAG, INJECTION (ML) INTRAVENOUS
Qty: 1 | Refills: 0 | Status: DISCONTINUED | OUTPATIENT
Start: 2020-11-19 | End: 2020-11-19

## 2020-11-18 RX ORDER — GABAPENTIN 400 MG/1
900 CAPSULE ORAL AT BEDTIME
Refills: 0 | Status: DISCONTINUED | OUTPATIENT
Start: 2020-11-18 | End: 2020-11-24

## 2020-11-18 RX ORDER — GABAPENTIN 400 MG/1
300 CAPSULE ORAL DAILY
Refills: 0 | Status: DISCONTINUED | OUTPATIENT
Start: 2020-11-18 | End: 2020-11-18

## 2020-11-18 RX ORDER — GABAPENTIN 400 MG/1
700 CAPSULE ORAL AT BEDTIME
Refills: 0 | Status: DISCONTINUED | OUTPATIENT
Start: 2020-11-18 | End: 2020-11-18

## 2020-11-18 RX ORDER — DEXTROSE MONOHYDRATE, SODIUM CHLORIDE, AND POTASSIUM CHLORIDE 50; .745; 4.5 G/1000ML; G/1000ML; G/1000ML
1000 INJECTION, SOLUTION INTRAVENOUS
Refills: 0 | Status: DISCONTINUED | OUTPATIENT
Start: 2020-11-18 | End: 2020-11-19

## 2020-11-18 RX ORDER — CEFAZOLIN SODIUM 1 G
1550 VIAL (EA) INJECTION EVERY 8 HOURS
Refills: 0 | Status: DISCONTINUED | OUTPATIENT
Start: 2020-11-18 | End: 2020-11-19

## 2020-11-18 RX ORDER — NOREPINEPHRINE BITARTRATE/D5W 8 MG/250ML
0.02 PLASTIC BAG, INJECTION (ML) INTRAVENOUS
Qty: 1 | Refills: 0 | Status: DISCONTINUED | OUTPATIENT
Start: 2020-11-18 | End: 2020-11-18

## 2020-11-18 RX ORDER — OXYCODONE HYDROCHLORIDE 5 MG/1
5 TABLET ORAL EVERY 4 HOURS
Refills: 0 | Status: DISCONTINUED | OUTPATIENT
Start: 2020-11-18 | End: 2020-11-23

## 2020-11-18 RX ORDER — GABAPENTIN 400 MG/1
900 CAPSULE ORAL
Qty: 0 | Refills: 0 | DISCHARGE

## 2020-11-18 RX ORDER — DEXAMETHASONE 0.5 MG/5ML
10 ELIXIR ORAL EVERY 6 HOURS
Refills: 0 | Status: DISCONTINUED | OUTPATIENT
Start: 2020-11-18 | End: 2020-11-18

## 2020-11-18 RX ORDER — NOREPINEPHRINE BITARTRATE/D5W 8 MG/250ML
0.02 PLASTIC BAG, INJECTION (ML) INTRAVENOUS
Qty: 0.5 | Refills: 0 | Status: DISCONTINUED | OUTPATIENT
Start: 2020-11-18 | End: 2020-11-18

## 2020-11-18 RX ORDER — ACETAMINOPHEN 500 MG
480 TABLET ORAL EVERY 6 HOURS
Refills: 0 | Status: DISCONTINUED | OUTPATIENT
Start: 2020-11-18 | End: 2020-11-18

## 2020-11-18 RX ORDER — GABAPENTIN 400 MG/1
300 CAPSULE ORAL
Qty: 0 | Refills: 0 | DISCHARGE

## 2020-11-18 RX ORDER — DEXAMETHASONE 0.5 MG/5ML
10 ELIXIR ORAL EVERY 6 HOURS
Refills: 0 | Status: DISCONTINUED | OUTPATIENT
Start: 2020-11-18 | End: 2020-11-20

## 2020-11-18 RX ORDER — DIAZEPAM 5 MG
4.7 TABLET ORAL EVERY 6 HOURS
Refills: 0 | Status: DISCONTINUED | OUTPATIENT
Start: 2020-11-18 | End: 2020-11-19

## 2020-11-18 RX ORDER — ACETAMINOPHEN 500 MG
650 TABLET ORAL EVERY 6 HOURS
Refills: 0 | Status: DISCONTINUED | OUTPATIENT
Start: 2020-11-18 | End: 2020-11-20

## 2020-11-18 RX ORDER — CHOLECALCIFEROL (VITAMIN D3) 125 MCG
1000 CAPSULE ORAL DAILY
Refills: 0 | Status: DISCONTINUED | OUTPATIENT
Start: 2020-11-18 | End: 2020-11-24

## 2020-11-18 RX ADMIN — Medication 4.7 MILLIGRAM(S): at 15:51

## 2020-11-18 RX ADMIN — OXYCODONE HYDROCHLORIDE 5 MILLIGRAM(S): 5 TABLET ORAL at 22:04

## 2020-11-18 RX ADMIN — Medication 3 UNIT(S)/KG/HR: at 19:12

## 2020-11-18 RX ADMIN — Medication 3 UNIT(S)/KG/HR: at 18:24

## 2020-11-18 RX ADMIN — Medication 650 MILLIGRAM(S): at 20:12

## 2020-11-18 RX ADMIN — Medication 155 MILLIGRAM(S): at 18:38

## 2020-11-18 RX ADMIN — Medication 4.7 MILLIGRAM(S): at 21:09

## 2020-11-18 RX ADMIN — GABAPENTIN 900 MILLIGRAM(S): 400 CAPSULE ORAL at 20:12

## 2020-11-18 RX ADMIN — OXYCODONE HYDROCHLORIDE 5 MILLIGRAM(S): 5 TABLET ORAL at 22:30

## 2020-11-18 RX ADMIN — Medication 10 MILLIGRAM(S): at 16:58

## 2020-11-18 RX ADMIN — Medication 10 MILLIGRAM(S): at 22:11

## 2020-11-18 NOTE — TRANSFER ACCEPTANCE NOTE - ATTENDING COMMENTS
20 year old with NF I and intramedullary tumor POD 0 s/p tumor resection and laminectomy T9-T12. Intraoperative course complicated by loss of potentials on right side. No win PICU for hemodynamic assessment s/p extensive neuromuscular surgery.   Perioperative antibiotics  Keep MAPS >60  Decadron q 6  home meds  pain control

## 2020-11-18 NOTE — PROGRESS NOTE PEDS - SUBJECTIVE AND OBJECTIVE BOX
NEUROSURGERY POST OP CHECK   11-18-20 @ 17:32    Dx: 20y Female s/p T9-12 laminectomy with laminoplasty and resection of intramedullary spinal cord tumor, lost RLE motors during the case, RLE now 0/5 post op. Patient otherwise stable.     MEDICATIONS  (STANDING):  acetaminophen   Oral Liquid - Peds. 650 milliGRAM(s) Oral every 6 hours  ceFAZolin  IV Intermittent - Peds 1550 milliGRAM(s) IV Intermittent every 8 hours  cholecalciferol Oral Tab/Cap - Peds 1000 Unit(s) Oral daily  dexAMETHasone IV Intermittent - Pediatric 10 milliGRAM(s) IV Intermittent every 6 hours  dextrose 5% + sodium chloride 0.9% with potassium chloride 20 mEq/L. - Pediatric 1000 milliLiter(s) (87 mL/Hr) IV Continuous <Continuous>  diazepam IV Push - Peds 4.7 milliGRAM(s) IV Push every 6 hours  gabapentin Oral Tab/Cap - Peds 900 milliGRAM(s) Oral at bedtime  heparin   Infusion - Pediatric 0.064 Unit(s)/kG/Hr (3 mL/Hr) IV Continuous <Continuous>    MEDICATIONS  (PRN):  oxyCODONE   IR Oral Tab/Cap - Peds 5 milliGRAM(s) Oral every 4 hours PRN Severe Pain (7 - 10)    I&O's Summary    18 Nov 2020 07:01  -  18 Nov 2020 17:32  --------------------------------------------------------  IN: 261 mL / OUT: 0 mL / NET: 261 mL    PHYSICAL EXAM:  T(C): 36.7 (11-18-20 @ 17:00), Max: 36.8 (11-18-20 @ 15:11)  HR: 81 (11-18-20 @ 17:00) (68 - 81)  BP: 101/61 (11-18-20 @ 15:11) (101/61 - 101/61)  RR: 20 (11-18-20 @ 17:00) (10 - 22)  SpO2: 100% (11-18-20 @ 17:00) (100% - 100%)      AOx3, appropriate, follows commands  PERRL, EOMI, face symmetrical   SMALL x 3, RLE 0/5, other extremities strong   Sensation intact to light touch   No pronator drift   incision C/D/I

## 2020-11-18 NOTE — DISCHARGE NOTE PROVIDER - PROVIDER TOKENS
PROVIDER:[TOKEN:[[id number]],FOLLOWUP:[Routine],ESTABLISHEDPATIENT:[T]] PROVIDER:[TOKEN:[[id number]],FOLLOWUP:[1 week]] PROVIDER:[TOKEN:[[id number]],FOLLOWUP:[1 week]],PROVIDER:[TOKEN:[[id number]],FOLLOWUP:[1 week]] PROVIDER:[TOKEN:[[id number]],FOLLOWUP:[1 week]],PROVIDER:[TOKEN:[[id number]],FOLLOWUP:[1 week]]

## 2020-11-18 NOTE — BRIEF OPERATIVE NOTE - NSICDXBRIEFPOSTOP_GEN_ALL_CORE_FT
POST-OP DIAGNOSIS:  Malignant neoplasm of intramedullary spinal cord 18-Nov-2020 15:31:36  Dipika Manzano

## 2020-11-18 NOTE — TRANSFER ACCEPTANCE NOTE - HISTORY OF PRESENT ILLNESS
19yo with NF1 now POD #0 from T9-12 laminectomy with intramedullary tumor resection. Patient awake upon arrival to ICU.     PSH: right nephrectomy and right adrenalectomy   PMH: NF1, Optic glioma, hx of malignant nerve sheath tumor (2011) 21yo F with PMH significant for Neurofibromatosis Type 1, optic glioma, solitary kidney and albuterol allergy now POD #0 from T9-12 laminectomy with intramedullar tumor resection. Lost RLE potentials during procedure. Patient in and out of sedation on arrival to PICU.    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  2020- resection of neurofibroma   PMH: NF1, Optic glioma, focal nodular hyperplasia   Home meds: gabapentin 600 mg BID 19yo F with PMH significant for Neurofibromatosis Type 1, optic glioma, solitary kidney and albuterol allergy now POD #0 from T9-12 laminectomy with intramedullar tumor resection. Lost RLE potentials during procedure. Patient in and out of sedation on arrival to PICU.    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  2020- resection of neurofibroma   PMH: NF1, Optic glioma, focal nodular hyperplasia   Home meds: gabapentin 300 mg in am, 700mg in pm

## 2020-11-18 NOTE — DISCHARGE NOTE PROVIDER - CARE PROVIDER_API CALL
Wan Griffith  Neurological Surgery  [address] [place] [address], [address]  [place] [address]  Phone: [phone]  Fax: [phone]  Established Patient  Follow Up Time: Routine   Paco Larry  PEDIATRICS NEUROSURGERY  [address] [facility] [facility] [place] [address]  [place] [address]  Phone: [phone]  Fax: [phone]  Follow Up Time: 1 week   Paco Larry  PEDIATRICS NEUROSURGERY  [address] [facility], [address]  [place] [address]  Phone: [phone]  Fax: [phone]  Follow Up Time: 1 week    Dipika Banks  PEDIATRIC HEMATOLOGY/ONCOLOGY  [address] [place] [address]  Phone: [phone]  Fax: [phone]  Follow Up Time: 1 week   Paco Larry  PEDIATRICS NEUROSURGERY  [address] [facility], [address]  [place] [address]  Phone: [phone]  Fax: [phone]  Follow Up Time: 1 week    Wan Cerda  PEDIATRIC HEMATOLOGY/ONCOLOGY  [id number] [place] [address]  Phone: [phone]  Fax: [phone]  Follow Up Time: 1 week

## 2020-11-18 NOTE — DISCHARGE NOTE PROVIDER - NSDCCPCAREPLAN_GEN_ALL_CORE_FT
PRINCIPAL DISCHARGE DIAGNOSIS  Diagnosis: Plexiform neurofibroma  Assessment and Plan of Treatment:        PRINCIPAL DISCHARGE DIAGNOSIS  Diagnosis: Plexiform neurofibroma  Assessment and Plan of Treatment: Follow up with Dr. Cerda and Dr. Griffith as instructed.  Take 4 tabs dexamethasone every 6 hours on Nov 21. Take 3 tabs dexamethasone every 8 hours Nov 22-23. Take 2 tabs dexamethasone every 12 hours on Nov 24. Take 1  tab every 12 hours on Nov 25. Take 1 tab on Nov 26. Stop taking dexamethasone after that.  Take 1 tab Valium up to every 6 hours and/or 1 tab oxycodone up to every 4 hours for severe pain not responsive to Tylenol.   Take 1 tab lansoprazole every day.       PRINCIPAL DISCHARGE DIAGNOSIS  Diagnosis: Plexiform neurofibroma  Assessment and Plan of Treatment: Follow up with Dr. Cerda and Dr. Griffith as instructed.  Take 4 tabs dexamethasone every 6 hours on Nov 21. Take 3 tabs dexamethasone every 8 hours Nov 22-23. Take 2 tabs dexamethasone every 12 hours on Nov 24. Take 1  tab every 12 hours on Nov 25. Take 1 tab on Nov 26. Stop taking dexamethasone after that.  Take 1 tab Valium up to every 6 hours and/or 1 tab oxycodone up to every 4 hours for severe pain not responsive to Tylenol.   Take 1 tab omeprazole every day.       PRINCIPAL DISCHARGE DIAGNOSIS  Diagnosis: Grade I astrocytoma  Assessment and Plan of Treatment: spine grade 1 astrocytoma       PRINCIPAL DISCHARGE DIAGNOSIS  Diagnosis: Grade I astrocytoma  Assessment and Plan of Treatment: Grade 1 pylocytic astrocytoma of spine

## 2020-11-18 NOTE — DISCHARGE NOTE PROVIDER - NSDCFUADDAPPT_GEN_ALL_CORE_FT
Please call office to schedule appointment with Dr. Larry for 1 week. Please call office to schedule appointment with Dr. Larry for 1 week.  [facility] PT/OT appt ian guadalupe 11/27 8:40AM

## 2020-11-18 NOTE — DISCHARGE NOTE PROVIDER - NSDCCPTREATMENT_GEN_ALL_CORE_FT
PRINCIPAL PROCEDURE  Procedure: Laminectomy, spine, thoracolumbar, for excision of intradural intramedullary neoplasm  Findings and Treatment:        PRINCIPAL PROCEDURE  Procedure: Laminectomy, spine, thoracolumbar, for excision of intradural intramedullary neoplasm  Findings and Treatment: Final pathology: Grade 1 spine astrocytoma

## 2020-11-18 NOTE — ASU PATIENT PROFILE, PEDIATRIC - LOW RISK FALLS INTERVENTIONS (SCORE 7-11)
Side rails x 2 or 4 up, assess large gaps, such that a patient could get extremity or other body part entrapped, use additional safety procedures/Orientation to room/Bed in low position, brakes on/Use of non-skid footwear for ambulating patients, use of appropriate size clothing to prevent risk of tripping Use of non-skid footwear for ambulating patients, use of appropriate size clothing to prevent risk of tripping/Side rails x 2 or 4 up, assess large gaps, such that a patient could get extremity or other body part entrapped, use additional safety procedures/Bed in low position, brakes on/Patient and family education available to parents and patient/Document fall prevention teaching and include in plan of care/Orientation to room

## 2020-11-18 NOTE — DISCHARGE NOTE PROVIDER - HOSPITAL COURSE
21yo F with PMH significant for Neurofibromatosis Type 1, optic glioma, solitary kidney and albuterol allergy admitted to PICU for post op monitoring for T9-12 laminectomy with intramedullar tumor resection. Lost RLE potentials during procedure. Patient in and out of sedation on arrival to PICU.    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   ID: Patient was put on Ancef for 48 hours.   Neuro: RLE weakness noted on arrival. Patient received Decadron post op until __. Pain was managed with tylenol, valium, and oxycodone. Home gabapentin was continued during hospital stay.   FEN/GI: IVF were discontinued on __. Patient tolerated a regular diet. Home vitamin D was continued. 21yo F with PMH significant for Neurofibromatosis Type 1, optic glioma, solitary kidney and albuterol allergy admitted to PICU for post op monitoring for T9-12 laminectomy with intramedullar tumor resection. Lost RLE potentials during procedure. Patient in and out of sedation on arrival to PICU.    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   ID: Patient was put on Ancef for 48 hours.   Neuro: RLE weakness noted on arrival. Patient received Decadron and was placed on a taper schedule by neurosurgery. Pain was managed with tylenol, valium, and oxycodone. Home gabapentin was continued during hospital stay.   FEN/GI: IVF were discontinued on POD1. Patient tolerated a regular diet. Home vitamin D was continued.     Discharge Physical Exam  Gen- well-appearing  HEENT- PERRL, neck supple  CV- regular rate and rhythm, no murmur  Pulm- good air entry b/l, no wheezing or crackles  Abd- soft, nontender, nondistended  Ext- 5/5 strength in UE. 3/5 strength in right leg. 5/5 strength in left leg. Absent right patellar reflex. Present left patellar reflex. Sensation intact to light touch b/l 21yo F with PMH significant for Neurofibromatosis Type 1, optic glioma, solitary kidney and albuterol allergy admitted to PICU for post op monitoring for T9-12 laminectomy with intramedullar tumor resection. Lost RLE potentials during procedure. Patient in and out of sedation on arrival to PICU.    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   CV: MAPs were maintained above 70 with Norepi in the first 48 hours post-op, then d/c'd on 11/20. Patient otherwise HDS.   ID: Patient was put on Ancef for 24 hours.   Neuro: RLE weakness noted on arrival. Patient received Decadron and was placed on a taper schedule by neurosurgery. Pain was managed with tylenol, valium, and oxycodone. Home gabapentin was continued during hospital stay. Patient was seen by PT/OT during stay and was provided ___ on d/c.   FEN/GI: IVF were discontinued on POD1. Patient tolerated a regular diet. Home vitamin D was continued.     Discharge Physical Exam  Gen- well-appearing  HEENT- PERRL, neck supple  CV- regular rate and rhythm, no murmur  Pulm- good air entry b/l, no wheezing or crackles  Abd- soft, nontender, nondistended  Ext- 5/5 strength in UE. 3/5 strength in right leg. 5/5 strength in left leg. Absent right patellar reflex. Present left patellar reflex. Sensation intact to light touch b/l 21yo F with PMH significant for Neurofibromatosis Type 1, optic glioma, solitary kidney and albuterol allergy admitted to PICU for post op monitoring for T9-12 laminectomy with intramedullar tumor resection. Lost RLE potentials during procedure. Patient in and out of sedation on arrival to PICU.    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   CV: MAPs were maintained above 70 with Norepi in the first 48 hours post-op, then d/c'd on 11/20. Patient otherwise HDS.   ID: Patient was put on Ancef for 24 hours.   Neuro: RLE weakness noted on arrival. Patient received Decadron and was placed on a taper schedule by neurosurgery. Pain was managed with tylenol, valium, and oxycodone. Home gabapentin was continued during hospital stay. Patient was seen by PT/OT during stay to help with mobilization; patient continued to have inability to move right leg from knee down, but FROM at hip.  Patient was provided with a brace for the ankle as well as a walker to facilitate standing. Plans were made with the PICU team, Neurosurgical team, PT/OT and social work to send this patient to *** in order to provide intensive therapy to improve function of right lower extremity.   FEN/GI: IVF were discontinued on POD1. Patient tolerated a regular diet. Home vitamin D was continued.     Discharge Physical Exam  Gen- well-appearing  HEENT- PERRL, neck supple  CV- regular rate and rhythm, no murmur  Pulm- good air entry b/l, no wheezing or crackles  Abd- soft, nontender, nondistended  Ext- 5/5 strength in UE. 3/5 strength in right leg. 5/5 strength in left leg. Absent right patellar reflex. Present left patellar reflex. Unable to actively range ankle or knee on left. Sensation intact to light touch b/l 21yo F with PMH significant for Neurofibromatosis Type 1, optic glioma, solitary kidney and albuterol allergy admitted to PICU for post op monitoring for T9-12 laminectomy with intramedullar tumor resection. Lost RLE potentials during procedure. Patient in and out of sedation on arrival to PICU.    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   CV: MAPs were maintained above 70 with Norepi in the first 48 hours post-op, then d/c'd on 11/20. Patient noted to be bradycardic to high 30s and 40s while asleep and 40s-60s while awake. Given good perfusion and normal blood pressures, deemed secondary to Valium. Unlikely secondary to steroid taper as patient has been on steroids for short period of time.   ID: Patient was put on Ancef for 24 hours.   Neuro: RLE weakness noted on arrival. Patient received Decadron and was placed on a taper schedule by neurosurgery. Pain was managed with tylenol, valium, and oxycodone. Home gabapentin was continued during hospital stay. Patient was seen by PT/OT during stay to help with mobilization; patient continued to have inability to move right leg from knee down, but FROM at hip.  Patient was provided with a brace for the ankle as well as a walker to facilitate standing. Plans were made with the PICU team, Neurosurgical team, PT/OT and social work to send this patient to ____ in order to provide intensive therapy to improve function of right lower extremity.   FEN/GI: IVF were discontinued on POD1. Patient tolerated a regular diet. Home vitamin D was continued.     Floor (11/22-)     Discharge Physical Exam  Gen- well-appearing  HEENT- PERRL, neck supple  CV- regular rate and rhythm, no murmur  Pulm- good air entry b/l, no wheezing or crackles  Abd- soft, nontender, nondistended  Ext- 5/5 strength in UE. 3/5 strength in right leg. 5/5 strength in left leg. Absent right patellar reflex. Present left patellar reflex. Unable to actively range ankle or knee on left. Sensation intact to light touch b/l 21yo F with PMH significant for Neurofibromatosis Type 1, optic glioma, solitary kidney and albuterol allergy admitted to PICU for post op monitoring for T9-12 laminectomy with intramedullar tumor resection. Lost RLE potentials during procedure. Patient in and out of sedation on arrival to PICU.    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   CV: MAPs were maintained above 70 with Norepi in the first 48 hours post-op, then d/c'd on 11/20. Patient noted to be bradycardic to high 30s and 40s while asleep and 40s-60s while awake. Given good perfusion and normal blood pressures, deemed secondary to Valium. Unlikely secondary to steroid taper as patient has been on steroids for short period of time.   ID: Patient was put on Ancef for 24 hours.   Neuro: RLE weakness noted on arrival. Patient received Decadron and was placed on a taper schedule by neurosurgery. Pain was managed with tylenol, valium, and oxycodone. Home gabapentin was continued during hospital stay. Patient was seen by PT/OT during stay to help with mobilization; patient continued to have inability to move right leg from knee down, but FROM at hip.  Patient was provided with a brace for the ankle as well as a walker to facilitate standing. Plans were made with the PICU team, Neurosurgical team, PT/OT and social work to send this patient discharged home in order to provide intensive therapy to improve function of right lower extremity.   FEN/GI: IVF were discontinued on POD1. Patient tolerated a regular diet. Home vitamin D was continued.     Floor (11/22-)     Discharge Physical Exam  Gen- well-appearing  HEENT- PERRL, neck supple  CV- regular rate and rhythm, no murmur  Pulm- good air entry b/l, no wheezing or crackles  Abd- soft, nontender, nondistended  Ext- 5/5 strength in UE. 3/5 strength in right leg. 5/5 strength in left leg. Absent right patellar reflex. Present left patellar reflex. Unable to actively range ankle or knee on left. Sensation intact to light touch b/l  Incision - clean, dry, intact    Patient has been tolerating pain, PO diet, with regular BM / urination. Patient was seen by PT and recommended follow up outpatient for physical therapy and occupational therapy. Prescriptions were provided for PT/OT.  Prescriptions were also provided for rolling walker, comode, and shower chair. Patient is stable for discharge, and is to follow up with Dr. Larry in the office in 1 week. 21yo F with PMH significant for Neurofibromatosis Type 1, optic glioma, solitary kidney and albuterol allergy admitted to PICU for post op monitoring for T9-12 laminectomy with intramedullar tumor resection. Lost RLE potentials during procedure. Patient in and out of sedation on arrival to PICU.    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   CV: MAPs were maintained above 70 with Norepi in the first 48 hours post-op, then d/c'd on 11/20. Patient noted to be bradycardic to high 30s and 40s while asleep and 40s-60s while awake. Given good perfusion and normal blood pressures, deemed secondary to Valium. Unlikely secondary to steroid taper as patient has been on steroids for short period of time.   ID: Patient was put on Ancef for 24 hours.   Neuro: RLE weakness noted on arrival. Patient received Decadron and was placed on a taper schedule by neurosurgery. Pain was managed with tylenol, valium, and oxycodone. Home gabapentin was continued during hospital stay. Patient was seen by PT/OT during stay to help with mobilization; patient continued to have inability to move right leg from knee down, but FROM at hip and improving strength at knee (quadriceps).  Patient was provided with a brace for the ankle as well as a walker to facilitate standing. Plans were made with the PICU team, Neurosurgical team, PT/OT and social work to send this patient discharged home in order to provide intensive therapy to improve function of right lower extremity.   FEN/GI: IVF were discontinued on POD1. Patient tolerated a regular diet. Home vitamin D was continued.       Discharge Physical Exam  Vital Signs Last 24 Hrs  T(C): 36.6 (24 Nov 2020 08:00), Max: 36.8 (23 Nov 2020 16:45)  T(F): 97.8 (24 Nov 2020 08:00), Max: 98.2 (23 Nov 2020 16:45)  HR: 47 (24 Nov 2020 08:00) (43 - 78)  BP: 105/61 (24 Nov 2020 08:00) (93/48 - 105/61)  BP(mean): 71 (24 Nov 2020 08:00) (58 - 71)  RR: 15 (24 Nov 2020 08:00) (14 - 16)  SpO2: 98% (24 Nov 2020 08:00) (97% - 100%)    Gen- well-appearing  HEENT- PERRL, neck supple  CV- regular rate and rhythm, no murmur  Pulm- good air entry b/l, no wheezing or crackles  Abd- soft, nontender, nondistended  Ext- 5/5 strength in UE. 3/5 strength in right leg. 5/5 strength in left leg. Absent right patellar reflex. Present left patellar reflex. Unable to actively range ankle or knee on left. Sensation intact to light touch b/l  Incision - clean, dry, intact    Patient has been tolerating pain, PO diet, with regular BM / urination. Patient was seen by PT and recommended follow up outpatient for physical therapy and occupational therapy. Prescriptions were provided for PT/OT.  Prescriptions were also provided for rolling walker, comode, and shower chair. Patient is stable for discharge, and is to follow up with Dr. Larry in the office in 1 week. 20y Female PMhx neurofibramatosis type 1 s/p SOC 2014, optic glioma, solitary kidney s/p nephr/adrenelectomy, T10-11 lami rxn tumor 2/2020, now s/p T9-12 laminectomy with laminoplasty and resection of intramedullary spinal cord tumor on 11/18/2020, lost RLE motors during the case.     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   CV: MAPs were maintained above 70 with Norepi in the first 48 hours post-op, then d/c'd on 11/20. Patient noted to be bradycardic to high 30s and 40s while asleep and 40s-60s while awake. Given good perfusion and normal blood pressures, deemed secondary to Valium. Unlikely secondary to steroid taper as patient has been on steroids for short period of time.   ID: Patient was put on Ancef for 24 hours.   Neuro: RLE weakness noted on arrival. Patient received Decadron and was placed on a taper schedule by neurosurgery. Pain was managed with tylenol, valium, and oxycodone. Home gabapentin was continued during hospital stay. Patient was seen by PT/OT during stay to help with mobilization; patient continued to have inability to move right leg from knee down, but FROM at hip and improving strength at knee (quadriceps).  Patient was provided with a brace for the ankle as well as a walker to facilitate standing. Plans were made with the PICU team, Neurosurgical team, PT/OT and social work to send this patient discharged home in order to provide intensive therapy to improve function of right lower extremity.   FEN/GI: IVF were discontinued on POD1. Patient tolerated a regular diet. Home vitamin D was continued.     Discharge Physical Exam  Mental Status: Awake, Alert, Affect appropriate  PERRL, EOMI  Motor:  R hip 1/5  R KF 3/5, R KE 4/5, 0/5 plant/dors flexion   L side 5/5   Incision/Wound: c/d/i    Patient has been tolerating pain, PO diet, with regular BM / urination. Patient was seen by PT and recommended follow up outpatient for physical therapy and occupational therapy. Prescriptions were provided for PT/OT.  Prescriptions were also provided for rolling walker, comode, and shower chair. Patient is stable for discharge, and is to follow up with Dr. Larry in the office in 1 week.

## 2020-11-18 NOTE — ASU PATIENT PROFILE, PEDIATRIC - TEACHING/LEARNING RELIGIOUS CONSIDERATIONS PEDS
none Information: Selecting Yes will display possible errors in your note based on the variables you have selected. This validation is only offered as a suggestion for you. PLEASE NOTE THAT THE VALIDATION TEXT WILL BE REMOVED WHEN YOU FINALIZE YOUR NOTE. IF YOU WANT TO FAX A PRELIMINARY NOTE YOU WILL NEED TO TOGGLE THIS TO 'NO' IF YOU DO NOT WANT IT IN YOUR FAXED NOTE.

## 2020-11-18 NOTE — DISCHARGE NOTE PROVIDER - NSDCMRMEDTOKEN_GEN_ALL_CORE_FT
gabapentin: 300 milligram(s) orally in am  gabapentin 300 mg oral capsule: 700 milligram(s) orally once a day in pm    gabapentin 300 mg oral capsule: 900 milligram(s) orally once a day (at bedtime)   acetaminophen 325 mg oral tablet: 2 tab(s) orally every 6 hours as needed for pain  dexamethasone 1 mg oral tablet: 3 tab(s) orally every 8 hours   diazePAM 5 mg oral tablet: 1 tab(s) orally every 6 hours as needed for pain MDD:30 mg  gabapentin 300 mg oral capsule: 900 milligram(s) orally once a day (at bedtime)  lansoprazole 15 mg oral tablet, disintegratin tab(s) orally once a day   Thera-D Rapid Repletion oral tablet: 1000 unit(s) orally once a day   acetaminophen 325 mg oral tablet: 2 tab(s) orally every 6 hours as needed for pain  dexamethasone 1 mg oral tablet: 3 tab(s) orally every 8 hours   diazePAM 5 mg oral tablet: 1 tab(s) orally every 6 hours as needed for pain MDD:30 mg  gabapentin 300 mg oral capsule: 900 milligram(s) orally once a day (at bedtime)  lansoprazole 15 mg oral tablet, disintegratin tab(s) orally once a day   omeprazole 20 mg oral delayed release tablet: 1 tab(s) orally once a day   Weight 46kg  Thera-D Rapid Repletion oral tablet: 1000 unit(s) orally once a day   acetaminophen 325 mg oral tablet: 2 tab(s) orally every 6 hours as needed for pain  dexamethasone 1 mg oral tablet: 3 tab(s) orally every 8 hours   diazePAM 5 mg oral tablet: 1 tab(s) orally every 6 hours as needed for pain MDD:30 mg  gabapentin 300 mg oral capsule: 900 milligram(s) orally once a day (at bedtime)  omeprazole 20 mg oral delayed release tablet: 1 tab(s) orally once a day   Weight 46kg  Thera-D Rapid Repletion oral tablet: 1000 unit(s) orally once a day   acetaminophen 325 mg oral tablet: 2 tab(s) orally every 6 hours as needed for pain  dexamethasone 1 mg oral tablet: 3 tabs q 8 hr x 1 day, 2 tabs q 12 hr x 2 days, 1 tab q 12 hr x 1 day, 1 tab once a day then off  diazePAM 5 mg oral tablet: 1 tab(s) orally every 6 hours as needed for pain MDD:30 mg  gabapentin 300 mg oral capsule: 900 milligram(s) orally once a day (at bedtime)  omeprazole 20 mg oral delayed release tablet: 1 tab(s) orally once a day   Weight 46kg  oxyCODONE 5 mg oral tablet: 1 tab(s) orally every 6 hours as needed for pain MDD:4  Thera-D Rapid Repletion oral tablet: 1000 unit(s) orally once a day

## 2020-11-18 NOTE — PATIENT PROFILE PEDIATRIC. - LOW RISK FALLS INTERVENTIONS (SCORE 7-11)
Assess eliminations need, assist as needed/Environment clear of unused equipment, furniture's in place, clear of hazards/Orientation to room/Patient and family education available to parents and patient/Bed in low position, brakes on

## 2020-11-18 NOTE — BRIEF OPERATIVE NOTE - OPERATION/FINDINGS
T9-12 laminectomy with laminoplasty and resection of intramedullary spinal cord tumor, lost RLE motors during the case

## 2020-11-18 NOTE — TRANSFER ACCEPTANCE NOTE - ASSESSMENT
21yo F with PMH significant for Neurofibromatosis Type 1 now POD #0 from T9-12 laminectomy with intramedullar tumor resection. Lost RLE potentials during procedure and patient is unable to move RLE upon exam, however sensation is intact. Will continue to monitor strength of extremities. Will treat patient on decadron 10mg q6, and will closely monitor patient's pain in post-op period.     Resp:  RA  Incentive spirometry     CV:  Maintain maps >70    ID:  Ancef for 24 hours   Monitor for fevers     Neuro:  Keep head of bed elevated   Decadron 10mg q6 for 3 days  Tylenol oral q6h  Valium q6h  Oxycodone q4h prn   Home gabapentin 300mg in am, 700mg in pm     FEN/GI:  Regular diet   1M IVF  Home vitamin D

## 2020-11-18 NOTE — DISCHARGE NOTE PROVIDER - NSDCFUADDINST_GEN_ALL_CORE_FT
- Incision should be left uncovered and open to air after post operative day 3 (11/21/2020). Incision does not need a bandage or bacitracin over it.   - Shower daily with shampoo/soap on post operative day 4 (11/22/2020). Avoid long soaks and do not submerge incision in water (no baths.) Allow soap and water to run over the incision. Pat incision area dry with clean towel- do not scrub. Please shower regularly to ensure incision stays clean to avoid post operative infections.   - Notify your surgeon if you notice increased redness, drainage or your incision area opening.   - Return to ER immediately for high fevers, severe headache, vomiting, lethargy or weakness  - Please call your neurosurgeon following discharge to make follow up appointment in 1 week after discharge unless otherwise specified. See contact information.  - Prescription post operative medication has been sent to [facility] in the hospital. All post operative prescriptions should be picked up before departing the hospital.  - Ambulate as tolerate. Continue with all "activities of daily living." Avoid strenuous activity or lifting more than 10 pounds until cleared for additional activity at your follow up appointment.  - Do not return to work or school until cleared by your neurosurgeon at your follow up visit unless specified to you during your hospital stay  - Stitches or staples will be removed in your neurosurgeons office, if applicable. If your sutures are dissolvable, they will dissolve over time. Do not pick or scratch at incision/staples/stitches.

## 2020-11-18 NOTE — DISCHARGE NOTE PROVIDER - CARE PROVIDERS DIRECT ADDRESSES
,[email or web address] [email or web address]

## 2020-11-18 NOTE — PROGRESS NOTE PEDS - ASSESSMENT
1y Female PMhx neurofibramatosis type 1 s/p SOC 2014, optic glioma, solitary kidney s/p nephr/adrenelectomy, T10-11 lami rxn tumor 2/2020, now s/p T9-12 laminectomy with laminoplasty and resection of intramedullary spinal cord tumor, lost RLE motors during the case    PLAN:   - Cont dex 10mg q 6 hr   - keep MAPS greater than 70  - keep HOB elevated and keep in bed   - ancef x 24 hrs   - MRI w/wo tomorrow   - Q1 hr neuro checks     Case discussed with attending neurosurgeon.

## 2020-11-18 NOTE — BRIEF OPERATIVE NOTE - NSICDXBRIEFPROCEDURE_GEN_ALL_CORE_FT
PROCEDURES:  Laminectomy, spine, thoracolumbar, for excision of intradural intramedullary neoplasm 18-Nov-2020 15:30:37  Dipika Manzano

## 2020-11-19 PROCEDURE — 99291 CRITICAL CARE FIRST HOUR: CPT

## 2020-11-19 PROCEDURE — 72157 MRI CHEST SPINE W/O & W/DYE: CPT | Mod: 26

## 2020-11-19 RX ORDER — DIAZEPAM 5 MG
4.7 TABLET ORAL EVERY 6 HOURS
Refills: 0 | Status: DISCONTINUED | OUTPATIENT
Start: 2020-11-19 | End: 2020-11-19

## 2020-11-19 RX ORDER — SENNA PLUS 8.6 MG/1
1 TABLET ORAL DAILY
Refills: 0 | Status: DISCONTINUED | OUTPATIENT
Start: 2020-11-19 | End: 2020-11-24

## 2020-11-19 RX ORDER — DIAZEPAM 5 MG
5 TABLET ORAL EVERY 6 HOURS
Refills: 0 | Status: DISCONTINUED | OUTPATIENT
Start: 2020-11-19 | End: 2020-11-22

## 2020-11-19 RX ORDER — POLYETHYLENE GLYCOL 3350 17 G/17G
17 POWDER, FOR SOLUTION ORAL DAILY
Refills: 0 | Status: DISCONTINUED | OUTPATIENT
Start: 2020-11-19 | End: 2020-11-20

## 2020-11-19 RX ORDER — NOREPINEPHRINE BITARTRATE/D5W 8 MG/250ML
0.02 PLASTIC BAG, INJECTION (ML) INTRAVENOUS
Qty: 1 | Refills: 0 | Status: DISCONTINUED | OUTPATIENT
Start: 2020-11-19 | End: 2020-11-20

## 2020-11-19 RX ADMIN — Medication 5.59 MICROGRAM(S)/KG/MIN: at 00:21

## 2020-11-19 RX ADMIN — OXYCODONE HYDROCHLORIDE 5 MILLIGRAM(S): 5 TABLET ORAL at 12:01

## 2020-11-19 RX ADMIN — Medication 155 MILLIGRAM(S): at 01:39

## 2020-11-19 RX ADMIN — Medication 650 MILLIGRAM(S): at 08:40

## 2020-11-19 RX ADMIN — Medication 650 MILLIGRAM(S): at 13:52

## 2020-11-19 RX ADMIN — Medication 8.39 MICROGRAM(S)/KG/MIN: at 06:37

## 2020-11-19 RX ADMIN — Medication 5 MILLIGRAM(S): at 16:20

## 2020-11-19 RX ADMIN — Medication 5 MILLIGRAM(S): at 09:30

## 2020-11-19 RX ADMIN — Medication 5 MILLIGRAM(S): at 21:42

## 2020-11-19 RX ADMIN — Medication 3 UNIT(S)/KG/HR: at 19:21

## 2020-11-19 RX ADMIN — Medication 10 MILLIGRAM(S): at 03:46

## 2020-11-19 RX ADMIN — Medication 8.39 MICROGRAM(S)/KG/MIN: at 19:54

## 2020-11-19 RX ADMIN — OXYCODONE HYDROCHLORIDE 5 MILLIGRAM(S): 5 TABLET ORAL at 11:05

## 2020-11-19 RX ADMIN — Medication 650 MILLIGRAM(S): at 08:17

## 2020-11-19 RX ADMIN — Medication 14 MICROGRAM(S)/KG/MIN: at 06:02

## 2020-11-19 RX ADMIN — Medication 650 MILLIGRAM(S): at 20:26

## 2020-11-19 RX ADMIN — Medication 8.39 MICROGRAM(S)/KG/MIN: at 01:39

## 2020-11-19 RX ADMIN — Medication 10 MILLIGRAM(S): at 18:23

## 2020-11-19 RX ADMIN — GABAPENTIN 900 MILLIGRAM(S): 400 CAPSULE ORAL at 22:15

## 2020-11-19 RX ADMIN — Medication 14 MICROGRAM(S)/KG/MIN: at 02:30

## 2020-11-19 RX ADMIN — Medication 650 MILLIGRAM(S): at 14:07

## 2020-11-19 RX ADMIN — Medication 8.39 MICROGRAM(S)/KG/MIN: at 07:27

## 2020-11-19 RX ADMIN — Medication 155 MILLIGRAM(S): at 09:37

## 2020-11-19 RX ADMIN — Medication 8.39 MICROGRAM(S)/KG/MIN: at 21:44

## 2020-11-19 RX ADMIN — Medication 650 MILLIGRAM(S): at 01:39

## 2020-11-19 RX ADMIN — Medication 1000 UNIT(S): at 09:39

## 2020-11-19 RX ADMIN — Medication 4.7 MILLIGRAM(S): at 03:14

## 2020-11-19 RX ADMIN — Medication 10 MILLIGRAM(S): at 11:03

## 2020-11-19 NOTE — OCCUPATIONAL THERAPY INITIAL EVALUATION PEDIATRIC - GROWTH AND DEVELOPMENT COMMENT, PEDS PROFILE
Pt lives in an apartment with ramp/elevator access. Pt was a community ambulator, walked to/from part time job at [employer].

## 2020-11-19 NOTE — OCCUPATIONAL THERAPY INITIAL EVALUATION PEDIATRIC - LEVEL OF INDEPENDENCE: TOILET, REHAB EVAL
n/a at this time - educ pt/MOC/RN on safety with commode t/f c good understanding. MOC aware to only perform t/f with RN assist at this time.

## 2020-11-19 NOTE — PROGRESS NOTE PEDS - ASSESSMENT
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 OR course complicated by loss of potentials on right side, so MAP gaol increased to maintain spinal perfusion. Patient has required vasoactive infusion to maintain MAP goal.     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 OR course complicated by loss of potentials on right side, so MAP gaol increased to maintain spinal perfusion. Patient has required vasoactive infusion to maintain MAP goal.     Resp:  no acute issues     CV:   NE weaning per MAP goal   MAP >70    FENGI:  po ad orion    Neuro:  Decadron 10 mg q 6  gabapentin (per home)  Valium, oxy   MRI today    ID  ancef to end today 20 year old female with NF I, nephrectomy, several neural sheath tumors previously who is now POD 1 s/p medullary tumor resection and laminectomy T9-T12. OR course complicated by loss of potentials on right side, so MAP goal increased to maintain spinal perfusion. Patient has required vasoactive infusion to maintain MAP goal.     Resp:  no acute issues     CV:   NE weaning per MAP goal   MAP >70    FENGI:  po ad orion    Neuro:  Decadron 10 mg q 6  gabapentin (per home)  Valium, oxy   MRI today    ID  ancef to end today

## 2020-11-19 NOTE — OCCUPATIONAL THERAPY INITIAL EVALUATION PEDIATRIC - LEVEL OF INDEPENDENCE: SIT/STAND, REHAB EVAL
moderate assist (50% patients effort)/educ MOC on strategy to assist pt with sit to stand c R knee blocked for carryover during non-therapy times - good return demo performed

## 2020-11-19 NOTE — PHYSICAL THERAPY INITIAL EVALUATION PEDIATRIC - NS INVR PLANNED THERAPY PEDS PT EVAL
balance training/strengthening/parent/caregiver education & training/transfer training/adaptive equipment/bed mobility training/functional activities/neuromuscular re-education/adl training

## 2020-11-19 NOTE — PROGRESS NOTE PEDS - SUBJECTIVE AND OBJECTIVE BOX
PAST 24hr EVENTS: POD #1 s/p T9-12 laminectomy with laminoplasty and resection of intramedullary spinal cord tumor, lost RLE motors during the case, immediately post op RLE 0/5, this morning able to move RLE HF 0/5, KE/KF 3/5, foot 0/5, Patient otherwise stable.    PHYSICAL EXAM:   Vital Signs Last 24 Hrs  T(C): 36.7 (19 Nov 2020 05:00), Max: 36.8 (18 Nov 2020 15:11)  T(F): 98 (19 Nov 2020 05:00), Max: 98.2 (18 Nov 2020 15:11)  HR: 56 (19 Nov 2020 07:00) (49 - 81)  BP: 96/50 (18 Nov 2020 23:30) (96/50 - 101/61)  BP(mean): 61 (18 Nov 2020 23:30) (61 - 69)  RR: 16 (19 Nov 2020 07:00) (10 - 22)  SpO2: 100% (19 Nov 2020 07:00) (98% - 100%)    AOx3, appropriate, follows commands  PERRL, EOMI, face symmetrical   SMALL x 4, RLE HF 0/5, KE/KF 3/5, foot 0/5, other extremities strong   Sensation intact to light touch   No pronator drift   incision C/D/I     I&O's Summary    18 Nov 2020 07:01  -  19 Nov 2020 07:00  --------------------------------------------------------  IN: 1752 mL / OUT: 1405 mL / NET: 347 mL      MEDICATIONS  (STANDING):  acetaminophen   Oral Liquid - Peds. 650 milliGRAM(s) Oral every 6 hours  ceFAZolin  IV Intermittent - Peds 1550 milliGRAM(s) IV Intermittent every 8 hours  cholecalciferol Oral Tab/Cap - Peds 1000 Unit(s) Oral daily  dexAMETHasone IV Intermittent - Pediatric 10 milliGRAM(s) IV Intermittent every 6 hours  dextrose 5% + sodium chloride 0.9% with potassium chloride 20 mEq/L. - Pediatric 1000 milliLiter(s) (100 mL/Hr) IV Continuous <Continuous>  diazepam IV Push - Peds 4.7 milliGRAM(s) IV Push every 6 hours  gabapentin Oral Tab/Cap - Peds 900 milliGRAM(s) Oral at bedtime  heparin   Infusion - Pediatric 0.064 Unit(s)/kG/Hr (3 mL/Hr) IV Continuous <Continuous>  norepinephrine Infusion - Peds 0.03 MICROgram(s)/kG/Min (8.39 mL/Hr) IV Continuous <Continuous>    MEDICATIONS  (PRN):  oxyCODONE   IR Oral Tab/Cap - Peds 5 milliGRAM(s) Oral every 4 hours PRN Severe Pain (7 - 10)      NPO STATUS:   REASON: [] OR procedure   [] imaging with sedation   [] medical need    [] other   RN Informed: [] Yes [] No  Family informed and educated [] Yes [] No

## 2020-11-19 NOTE — OCCUPATIONAL THERAPY INITIAL EVALUATION PEDIATRIC - GENERAL OBSERVATIONS, REHAB EVAL
Pt rec'd semi supine in bed, +LUE PIVx2, +RUE A line, +tele/pulse ox. Cleared for eval per RN. MOC present.

## 2020-11-19 NOTE — PROGRESS NOTE PEDS - ASSESSMENT
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

## 2020-11-19 NOTE — PROGRESS NOTE PEDS - SUBJECTIVE AND OBJECTIVE BOX
Interval/Overnight Events:    ===========================RESPIRATORY==========================  RR: 16 (11-19-20 @ 07:00) (10 - 22)  SpO2: 100% (11-19-20 @ 07:00) (98% - 100%)  End Tidal CO2:    Respiratory Support:   [ ] Inhaled Nitric Oxide:    [x] Airway Clearance Discussed  Extubation Readiness:  [ ] Not Applicable     [ ] Discussed and Assessed  Comments:    =========================CARDIOVASCULAR========================  HR: 56 (11-19-20 @ 07:00) (49 - 81)  BP: 96/50 (11-18-20 @ 23:30) (96/50 - 101/61)  ABP: 116/59 (11-19-20 @ 07:00) (94/50 - 120/60)  CVP(mm Hg): --  NIRS:  Cardiac Rhythm:	[x] NSR		[ ] Other:    Patient Care Access:  norepinephrine Infusion - Peds 0.03 MICROgram(s)/kG/Min IV Continuous <Continuous>  Comments:    =====================HEMATOLOGY/ONCOLOGY=====================  Transfusions:	[ ] PRBC	[ ] Platelets	[ ] FFP		[ ] Cryoprecipitate  DVT Prophylaxis:  heparin   Infusion - Pediatric 0.064 Unit(s)/kG/Hr IV Continuous <Continuous>  Comments:    ========================INFECTIOUS DISEASE=======================  T(C): 36.7 (11-19-20 @ 05:00), Max: 36.8 (11-18-20 @ 15:11)  T(F): 98 (11-19-20 @ 05:00), Max: 98.2 (11-18-20 @ 15:11)  [ ] Cooling [place] being used. Target Temperature:    ceFAZolin  IV Intermittent - Peds 1550 milliGRAM(s) IV Intermittent every 8 hours    ==================FLUIDS/ELECTROLYTES/NUTRITION=================  I&O's Summary    18 Nov 2020 07:01  -  19 Nov 2020 07:00  --------------------------------------------------------  IN: 1752 mL / OUT: 1405 mL / NET: 347 mL      Diet:   [ ] NGT		[ ] NDT		[ ] GT		[ ] GJT    cholecalciferol Oral Tab/Cap - Peds 1000 Unit(s) Oral daily  dextrose 5% + sodium chloride 0.9% with potassium chloride 20 mEq/L. - Pediatric 1000 milliLiter(s) IV Continuous <Continuous>  Comments:    ==========================NEUROLOGY===========================  [ ] SBS:		[ ] OSCAR-1:	[ ] BIS:	[ ] CAPD:  acetaminophen   Oral Liquid - Peds. 650 milliGRAM(s) Oral every 6 hours  diazepam IV Push - Peds 4.7 milliGRAM(s) IV Push every 6 hours  gabapentin Oral Tab/Cap - Peds 900 milliGRAM(s) Oral at bedtime  oxyCODONE   IR Oral Tab/Cap - Peds 5 milliGRAM(s) Oral every 4 hours PRN  [x] Adequacy of sedation and pain control has been assessed and adjusted  Comments:    OTHER MEDICATIONS:  dexAMETHasone IV Intermittent - Pediatric 10 milliGRAM(s) IV Intermittent every 6 hours    =========================PATIENT CARE==========================  [ ] There are pressure ulcers/areas of breakdown that are being addressed.  [x] Preventative measures are being taken to decrease risk for skin breakdown.  [x] Necessity of urinary, arterial, and venous catheters discussed    =========================PHYSICAL EXAM=========================  GENERAL: In no acute distress  RESPIRATORY: Lungs clear to auscultation bilaterally. Good aeration. No rales, rhonchi, retractions or wheezing. Effort even and unlabored.  CARDIOVASCULAR: Regular rate and rhythm. Normal S1/S2. No murmurs, rubs, or gallop. Capillary refill < 2 seconds. Distal pulses 2+ and equal.  ABDOMEN: Soft, non-distended. Bowel sounds present. No palpable hepatosplenomegaly.  SKIN: No rash.  EXTREMITIES: Warm and well perfused. No gross extremity deformities.  NEUROLOGIC: Alert and oriented. No acute change from baseline exam.    ===============================================================  LABS:  ABG - ( 18 Nov 2020 12:25 )  pH: 7.36  /  pCO2: 35    /  pO2: 290   / HCO3: 20    / Base Excess: -5.1  /  SaO2: 99.8  / Lactate: x        RECENT CULTURES:      IMAGING STUDIES:    Parent/Guardian is at the bedside:	[ ] Yes	[ ] No  Patient and Parent/Guardian updated as to the progress/plan of care:	[ ] Yes	[ ] No    [ ] The patient remains in critical and unstable condition, and requires ICU care and monitoring, total critical care time spent by myself, the attending physician was __ minutes, excluding procedure time.  [ ] The patient is improving but requires continued monitoring and adjustment of therapy Interval/Overnight Events: Started on NE overnight for BP below goal.    ===========================RESPIRATORY==========================  RR: 16 (11-19-20 @ 07:00) (10 - 22)  SpO2: 100% (11-19-20 @ 07:00) (98% - 100%)  End Tidal CO2:    Respiratory Support: RA  [ ] Inhaled Nitric Oxide:    [x] Airway Clearance Discussed  Extubation Readiness:  [ ] Not Applicable     [ ] Discussed and Assessed  Comments:    =========================CARDIOVASCULAR========================  HR: 56 (11-19-20 @ 07:00) (49 - 81)  BP: 96/50 (11-18-20 @ 23:30) (96/50 - 101/61)  ABP: 116/59 (11-19-20 @ 07:00) (94/50 - 120/60)  CVP(mm Hg): --  NIRS:  Cardiac Rhythm:	[x] NSR		[ ] Other:    Patient Care Access: PIV  norepinephrine Infusion - Peds 0.03 MICROgram(s)/kG/Min IV Continuous <Continuous>  Comments:    =====================HEMATOLOGY/ONCOLOGY=====================  Transfusions:	[ ] PRBC	[ ] Platelets	[ ] FFP		[ ] Cryoprecipitate  DVT Prophylaxis:  heparin   Infusion - Pediatric 0.064 Unit(s)/kG/Hr IV Continuous <Continuous>  Comments:    ========================INFECTIOUS DISEASE=======================  T(C): 36.7 (11-19-20 @ 05:00), Max: 36.8 (11-18-20 @ 15:11)  T(F): 98 (11-19-20 @ 05:00), Max: 98.2 (11-18-20 @ 15:11)  [ ] Cooling [place] being used. Target Temperature:    ceFAZolin  IV Intermittent - Peds 1550 milliGRAM(s) IV Intermittent every 8 hours    ==================FLUIDS/ELECTROLYTES/NUTRITION=================  I&O's Summary    18 Nov 2020 07:01  -  19 Nov 2020 07:00  --------------------------------------------------------  IN: 1752 mL / OUT: 1405 mL / NET: 347 mL      Diet: po ad orion  [ ] NGT		[ ] NDT		[ ] GT		[ ] GJT    cholecalciferol Oral Tab/Cap - Peds 1000 Unit(s) Oral daily  dextrose 5% + sodium chloride 0.9% with potassium chloride 20 mEq/L. - Pediatric 1000 milliLiter(s) IV Continuous <Continuous>  Comments:    ==========================NEUROLOGY===========================  [ ] SBS:		[ ] OSCAR-1:	[ ] BIS:	[ ] CAPD:  acetaminophen   Oral Liquid - Peds. 650 milliGRAM(s) Oral every 6 hours  diazepam IV Push - Peds 4.7 milliGRAM(s) IV Push every 6 hours  gabapentin Oral Tab/Cap - Peds 900 milliGRAM(s) Oral at bedtime  oxyCODONE   IR Oral Tab/Cap - Peds 5 milliGRAM(s) Oral every 4 hours PRN  [x] Adequacy of sedation and pain control has been assessed and adjusted  Comments:    OTHER MEDICATIONS:  dexAMETHasone IV Intermittent - Pediatric 10 milliGRAM(s) IV Intermittent every 6 hours    =========================PATIENT CARE==========================  [ ] There are pressure ulcers/areas of breakdown that are being addressed.  [x] Preventative measures are being taken to decrease risk for skin breakdown.  [x] Necessity of urinary, arterial, and venous catheters discussed    =========================PHYSICAL EXAM=========================  GENERAL: In no acute distress  RESPIRATORY: Lungs clear to auscultation bilaterally. Good aeration. No rales, rhonchi, retractions or wheezing. Effort even and unlabored.  CARDIOVASCULAR: Regular rate and rhythm. Normal S1/S2. No murmurs, rubs, or gallop. Capillary refill < 2 seconds. Distal pulses 2+ and equal.  ABDOMEN: Soft, non-distended. Bowel sounds present. No palpable hepatosplenomegaly.  SKIN: No rash. dressings in place cdi   EXTREMITIES: Warm and well perfused. No gross extremity deformities.  NEUROLOGIC: Alert and oriented. No acute change from baseline exam.    ===============================================================  LABS:  ABG - ( 18 Nov 2020 12:25 )  pH: 7.36  /  pCO2: 35    /  pO2: 290   / HCO3: 20    / Base Excess: -5.1  /  SaO2: 99.8  / Lactate: x        RECENT CULTURES:      IMAGING STUDIES:    Parent/Guardian is at the bedside:	[X ] Yes	[ ] No  Patient and Parent/Guardian updated as to the progress/plan of care:	[ X] Yes	[ ] No    [X ] The patient remains in critical and unstable condition, and requires ICU care and monitoring, total critical care time spent by myself, the attending physician was 35 minutes, excluding procedure time.  [ ] The patient is improving but requires continued monitoring and adjustment of therapy

## 2020-11-19 NOTE — PHYSICAL THERAPY INITIAL EVALUATION PEDIATRIC - GENERAL OBSERVATIONS, REHAB EVAL
Received in semi-supine in bed in care of Mom, ok for eval.  OT present for evaluation.  +tele/pulse ox, +a line.  Awake and alert, reports no pain at this time.

## 2020-11-19 NOTE — OCCUPATIONAL THERAPY INITIAL EVALUATION PEDIATRIC - NS INVR PLANNED THERAPY PEDS PT EVAL
balance training/bed mobility training/strengthening/adl training/adaptive equipment/functional activities/neuromuscular re-education/parent/caregiver education & training/transfer training

## 2020-11-19 NOTE — OCCUPATIONAL THERAPY INITIAL EVALUATION PEDIATRIC - PERTINENT HX OF CURRENT PROBLEM, REHAB EVAL
POD #1 s/p T9-12 laminectomy with laminoplasty and resection of intramedullary spinal cord tumor, lost RLE motors during the case, immediately post op RLE 0/5, this morning, improved knee flex/ext (see below for specifics), Patient otherwise stable.

## 2020-11-20 PROCEDURE — 99233 SBSQ HOSP IP/OBS HIGH 50: CPT

## 2020-11-20 RX ORDER — DEXAMETHASONE 0.5 MG/5ML
ELIXIR ORAL
Refills: 0 | Status: DISCONTINUED | OUTPATIENT
Start: 2020-11-20 | End: 2020-11-24

## 2020-11-20 RX ORDER — CHOLECALCIFEROL (VITAMIN D3) 125 MCG
1000 CAPSULE ORAL
Qty: 0 | Refills: 0 | DISCHARGE
Start: 2020-11-20

## 2020-11-20 RX ORDER — DEXAMETHASONE 0.5 MG/5ML
4 ELIXIR ORAL EVERY 6 HOURS
Refills: 0 | Status: COMPLETED | OUTPATIENT
Start: 2020-11-20 | End: 2020-11-22

## 2020-11-20 RX ORDER — DEXAMETHASONE 0.5 MG/5ML
3 ELIXIR ORAL EVERY 8 HOURS
Refills: 0 | Status: COMPLETED | OUTPATIENT
Start: 2020-11-22 | End: 2020-11-24

## 2020-11-20 RX ORDER — OMEPRAZOLE 10 MG/1
1 CAPSULE, DELAYED RELEASE ORAL
Qty: 14 | Refills: 0
Start: 2020-11-20 | End: 2020-12-03

## 2020-11-20 RX ORDER — ACETAMINOPHEN 500 MG
2 TABLET ORAL
Qty: 40 | Refills: 0
Start: 2020-11-20 | End: 2020-11-24

## 2020-11-20 RX ORDER — DEXAMETHASONE 0.5 MG/5ML
1 ELIXIR ORAL DAILY
Refills: 0 | Status: CANCELLED | OUTPATIENT
Start: 2020-11-26 | End: 2020-11-24

## 2020-11-20 RX ORDER — DIAZEPAM 5 MG
1 TABLET ORAL
Qty: 12 | Refills: 0
Start: 2020-11-20 | End: 2020-11-22

## 2020-11-20 RX ORDER — POLYETHYLENE GLYCOL 3350 17 G/17G
17 POWDER, FOR SOLUTION ORAL DAILY
Refills: 0 | Status: DISCONTINUED | OUTPATIENT
Start: 2020-11-20 | End: 2020-11-24

## 2020-11-20 RX ORDER — OXYCODONE HYDROCHLORIDE 5 MG/1
1 TABLET ORAL
Qty: 12 | Refills: 0
Start: 2020-11-20 | End: 2020-11-21

## 2020-11-20 RX ORDER — ACETAMINOPHEN 500 MG
650 TABLET ORAL EVERY 6 HOURS
Refills: 0 | Status: DISCONTINUED | OUTPATIENT
Start: 2020-11-20 | End: 2020-11-24

## 2020-11-20 RX ORDER — DEXAMETHASONE 0.5 MG/5ML
3 ELIXIR ORAL
Qty: 22 | Refills: 0
Start: 2020-11-20

## 2020-11-20 RX ORDER — LANSOPRAZOLE 15 MG/1
1 CAPSULE, DELAYED RELEASE ORAL
Qty: 30 | Refills: 0
Start: 2020-11-20 | End: 2020-12-19

## 2020-11-20 RX ORDER — DEXAMETHASONE 0.5 MG/5ML
2 ELIXIR ORAL EVERY 12 HOURS
Refills: 0 | Status: DISCONTINUED | OUTPATIENT
Start: 2020-11-24 | End: 2020-11-24

## 2020-11-20 RX ORDER — LANSOPRAZOLE 15 MG/1
30 CAPSULE, DELAYED RELEASE ORAL DAILY
Refills: 0 | Status: DISCONTINUED | OUTPATIENT
Start: 2020-11-20 | End: 2020-11-24

## 2020-11-20 RX ORDER — ACETAMINOPHEN 500 MG
480 TABLET ORAL EVERY 6 HOURS
Refills: 0 | Status: DISCONTINUED | OUTPATIENT
Start: 2020-11-20 | End: 2020-11-20

## 2020-11-20 RX ORDER — DEXAMETHASONE 0.5 MG/5ML
1 ELIXIR ORAL EVERY 12 HOURS
Refills: 0 | Status: DISCONTINUED | OUTPATIENT
Start: 2020-11-25 | End: 2020-11-24

## 2020-11-20 RX ADMIN — Medication 650 MILLIGRAM(S): at 20:30

## 2020-11-20 RX ADMIN — Medication 5 MILLIGRAM(S): at 09:56

## 2020-11-20 RX ADMIN — OXYCODONE HYDROCHLORIDE 5 MILLIGRAM(S): 5 TABLET ORAL at 11:25

## 2020-11-20 RX ADMIN — GABAPENTIN 900 MILLIGRAM(S): 400 CAPSULE ORAL at 22:12

## 2020-11-20 RX ADMIN — Medication 5 MILLIGRAM(S): at 04:17

## 2020-11-20 RX ADMIN — Medication 10 MILLIGRAM(S): at 00:14

## 2020-11-20 RX ADMIN — Medication 4 MILLIGRAM(S): at 17:18

## 2020-11-20 RX ADMIN — Medication 5 MILLIGRAM(S): at 23:29

## 2020-11-20 RX ADMIN — OXYCODONE HYDROCHLORIDE 5 MILLIGRAM(S): 5 TABLET ORAL at 20:40

## 2020-11-20 RX ADMIN — Medication 1000 UNIT(S): at 09:00

## 2020-11-20 RX ADMIN — Medication 650 MILLIGRAM(S): at 02:28

## 2020-11-20 RX ADMIN — Medication 10 MILLIGRAM(S): at 06:05

## 2020-11-20 RX ADMIN — Medication 650 MILLIGRAM(S): at 19:45

## 2020-11-20 RX ADMIN — Medication 5.59 MICROGRAM(S)/KG/MIN: at 07:23

## 2020-11-20 RX ADMIN — Medication 11.2 MICROGRAM(S)/KG/MIN: at 00:44

## 2020-11-20 RX ADMIN — Medication 5 MILLIGRAM(S): at 17:14

## 2020-11-20 RX ADMIN — Medication 4 MILLIGRAM(S): at 23:33

## 2020-11-20 RX ADMIN — Medication 3 UNIT(S)/KG/HR: at 07:24

## 2020-11-20 RX ADMIN — Medication 650 MILLIGRAM(S): at 07:50

## 2020-11-20 RX ADMIN — OXYCODONE HYDROCHLORIDE 5 MILLIGRAM(S): 5 TABLET ORAL at 21:00

## 2020-11-20 RX ADMIN — Medication 4 MILLIGRAM(S): at 11:40

## 2020-11-20 RX ADMIN — LANSOPRAZOLE 30 MILLIGRAM(S): 15 CAPSULE, DELAYED RELEASE ORAL at 09:00

## 2020-11-20 RX ADMIN — OXYCODONE HYDROCHLORIDE 5 MILLIGRAM(S): 5 TABLET ORAL at 10:55

## 2020-11-20 NOTE — PROGRESS NOTE PEDS - ASSESSMENT
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

## 2020-11-20 NOTE — PROGRESS NOTE PEDS - SUBJECTIVE AND OBJECTIVE BOX
PAST 24hr EVENTS: no issues overnight     PHYSICAL EXAM:   Vital Signs Last 24 Hrs  T(C): 36.6 (20 Nov 2020 05:00), Max: 36.8 (19 Nov 2020 08:00)  T(F): 97.8 (20 Nov 2020 05:00), Max: 98.2 (19 Nov 2020 08:00)  HR: 50 (20 Nov 2020 06:30) (45 - 92)  BP: 99/65 (19 Nov 2020 08:00) (99/65 - 99/65)  BP(mean): 73 (19 Nov 2020 08:00) (73 - 73)  RR: 16 (20 Nov 2020 06:30) (13 - 21)  SpO2: 97% (20 Nov 2020 06:30) (96% - 100%)    AOx3, appropriate, follows commands  PERRL, EOMI, face symmetrical   SMALL x 4, RLE HF 0/5, KE/KF 3/5, right ankle 3/5, foot 0/5, other extremities strong   Sensation intact to light touch   No pronator drift   incision C/D/I     I&O's Summary    18 Nov 2020 07:01  -  19 Nov 2020 07:00  --------------------------------------------------------  IN: 1752 mL / OUT: 1405 mL / NET: 347 mL    19 Nov 2020 07:01  -  20 Nov 2020 06:55  --------------------------------------------------------  IN: 1202.8 mL / OUT: 1684 mL / NET: -481.2 mL                      MEDICATIONS  (STANDING):  acetaminophen   Oral Liquid - Peds. 650 milliGRAM(s) Oral every 6 hours  cholecalciferol Oral Tab/Cap - Peds 1000 Unit(s) Oral daily  dexAMETHasone IV Intermittent - Pediatric 10 milliGRAM(s) IV Intermittent every 6 hours  diazepam  Oral Tab/Cap - Peds 5 milliGRAM(s) Oral every 6 hours  gabapentin Oral Tab/Cap - Peds 900 milliGRAM(s) Oral at bedtime  heparin   Infusion - Pediatric 0.064 Unit(s)/kG/Hr (3 mL/Hr) IV Continuous <Continuous>  lansoprazole  DR Oral Tab/Cap - Peds 30 milliGRAM(s) Oral daily  norepinephrine Infusion - Peds 0.04 MICROgram(s)/kG/Min (11.2 mL/Hr) IV Continuous <Continuous>  polyethylene glycol 3350 Oral Powder - Peds 17 Gram(s) Oral daily    MEDICATIONS  (PRN):  oxyCODONE   IR Oral Tab/Cap - Peds 5 milliGRAM(s) Oral every 4 hours PRN Severe Pain (7 - 10)  senna 8.6 milliGRAM(s) Oral Tablet - Peds 1 Tablet(s) Oral daily PRN Constipation      NPO STATUS:   REASON: [] OR procedure   [] imaging with sedation   [] medical need    [] other   RN Informed: [] Yes [] No  Family informed and educated [] Yes [] No    RADIOLOGY:  < from: MR Thoracic Spine w/wo IV Cont (11.19.20 @ 17:53) >  In the interval since the prior examination there has been a T9 through T 12 laminectomy with removal of the intramedullary mass previously identified. There is less expansion of the cord from T10 through T11 with low signal intensity within the central portion of the cord consistent with fluid and postoperative changes. There is no definite evidence of residual neoplasm. There is no significant hemorrhage. A small amount of normal expected postoperative changes are identified at the laminectomy site. After contrast administration there is normal osseous and vascular enhancement.    There is no significant change in the retroperitoneal masses likely representing schwannomas or neurofibromas CT    IMPRESSION: After T9-T12 laminectomy the intramedullary mass within the cord previously identified on 9/16/2020 has been removed. Postoperative changes are identified involving the spinal cord as well as the dorsal soft tissues at the level of the laminectomies.

## 2020-11-20 NOTE — PROGRESS NOTE PEDS - ASSESSMENT
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 OR course complicated by loss of potentials on right side, so MAP goal increased to maintain spinal perfusion. Patient has required vasoactive infusion to maintain MAP goal.     Resp:  no acute issues     CV:   NE weaning per MAP goal   MAP >70    FENGI:  po ad orion    Neuro:  Decadron 10 mg q 6  gabapentin (per home)  Valium, oxy   MRI today    ID  ancef to end today 20 year old female with NF I, nephrectomy, several neural sheath tumors previously who is now POD 1 s/p medullary tumor resection and laminectomy T9-T12. OR course complicated by loss of potentials on right side, so MAP goal increased to maintain spinal perfusion. Patient has required vasoactive infusion to maintain MAP goal.     Resp:  no acute issues     CV:   MAP liberalized per neurosurgery today- normal BP for age     FENGI:  po ad orion    Neuro:  Decadron wean today   gabapentin (per home)  Valium, oxy   MRI done yesterday     ID  ancef completed    DC a line 20 year old female with NF I, nephrectomy, several neural sheath tumors previously who is now POD 1 s/p medullary tumor resection and laminectomy T9-T12. OR course complicated by loss of potentials on right side, so MAP goal increased to maintain spinal perfusion. Post operative pain well controlled. Oncology team not yet involved as pathology pending.     Resp:  no acute issues     CV:   MAP liberalized per neurosurgery today- normal BP for age     FENGI:  po ad orion    Neuro:  Decadron wean today   gabapentin (per home)  Valium, oxy   MRI done yesterday     ID  ancef completed    DC a line

## 2020-11-20 NOTE — CHART NOTE - NSCHARTNOTEFT_GEN_A_CORE
R A line removed.  Pressure held until hemostasis achieved.  Dressing placed with no evidence of ongoing bleeding.  No complications noted.  Site will be monitored for evidence of bleeding or vascular compromise.

## 2020-11-20 NOTE — PROGRESS NOTE PEDS - SUBJECTIVE AND OBJECTIVE BOX
Interval/Overnight Events:    ===========================RESPIRATORY==========================  RR: 16 (11-20-20 @ 07:00) (13 - 21)  SpO2: 99% (11-20-20 @ 07:00) (96% - 100%)  End Tidal CO2:    Respiratory Support:   [ ] Inhaled Nitric Oxide:    [x] Airway Clearance Discussed  Extubation Readiness:  [ ] Not Applicable     [ ] Discussed and Assessed  Comments:    =========================CARDIOVASCULAR========================  HR: 48 (11-20-20 @ 07:00) (45 - 92)  BP: 99/65 (11-19-20 @ 08:00) (99/65 - 99/65)  ABP: 112/63 (11-20-20 @ 07:00) (88/50 - 132/88)  CVP(mm Hg): --  NIRS:  Cardiac Rhythm:	[x] NSR		[ ] Other:    Patient Care Access:  norepinephrine Infusion - Peds 0.02 MICROgram(s)/kG/Min IV Continuous <Continuous>  Comments:    =====================HEMATOLOGY/ONCOLOGY=====================  Transfusions:	[ ] PRBC	[ ] Platelets	[ ] FFP		[ ] Cryoprecipitate  DVT Prophylaxis:  heparin   Infusion - Pediatric 0.064 Unit(s)/kG/Hr IV Continuous <Continuous>  Comments:    ========================INFECTIOUS DISEASE=======================  T(C): 36.6 (11-20-20 @ 05:00), Max: 36.8 (11-19-20 @ 08:00)  T(F): 97.8 (11-20-20 @ 05:00), Max: 98.2 (11-19-20 @ 08:00)  [ ] Cooling [place] being used. Target Temperature:      ==================FLUIDS/ELECTROLYTES/NUTRITION=================  I&O's Summary    19 Nov 2020 07:01  -  20 Nov 2020 07:00  --------------------------------------------------------  IN: 1202.8 mL / OUT: 1984 mL / NET: -781.2 mL      Diet:   [ ] NGT		[ ] NDT		[ ] GT		[ ] GJT    cholecalciferol Oral Tab/Cap - Peds 1000 Unit(s) Oral daily  lansoprazole  DR Oral Tab/Cap - Peds 30 milliGRAM(s) Oral daily  polyethylene glycol 3350 Oral Powder - Peds 17 Gram(s) Oral daily  senna 8.6 milliGRAM(s) Oral Tablet - Peds 1 Tablet(s) Oral daily PRN  Comments:    ==========================NEUROLOGY===========================  [ ] SBS:		[ ] OSCAR-1:	[ ] BIS:	[ ] CAPD:  acetaminophen   Oral Liquid - Peds. 650 milliGRAM(s) Oral every 6 hours  diazepam  Oral Tab/Cap - Peds 5 milliGRAM(s) Oral every 6 hours  gabapentin Oral Tab/Cap - Peds 900 milliGRAM(s) Oral at bedtime  oxyCODONE   IR Oral Tab/Cap - Peds 5 milliGRAM(s) Oral every 4 hours PRN  [x] Adequacy of sedation and pain control has been assessed and adjusted  Comments:    OTHER MEDICATIONS:  dexAMETHasone IV Intermittent - Pediatric 10 milliGRAM(s) IV Intermittent every 6 hours    =========================PATIENT CARE==========================  [ ] There are pressure ulcers/areas of breakdown that are being addressed.  [x] Preventative measures are being taken to decrease risk for skin breakdown.  [x] Necessity of urinary, arterial, and venous catheters discussed    =========================PHYSICAL EXAM=========================  GENERAL: In no acute distress  RESPIRATORY: Lungs clear to auscultation bilaterally. Good aeration. No rales, rhonchi, retractions or wheezing. Effort even and unlabored.  CARDIOVASCULAR: Regular rate and rhythm. Normal S1/S2. No murmurs, rubs, or gallop. Capillary refill < 2 seconds. Distal pulses 2+ and equal.  ABDOMEN: Soft, non-distended. Bowel sounds present. No palpable hepatosplenomegaly.  SKIN: No rash.  EXTREMITIES: Warm and well perfused. No gross extremity deformities.  NEUROLOGIC: Alert and oriented. No acute change from baseline exam.    ===============================================================  LABS:  ABG - ( 18 Nov 2020 12:25 )  pH: 7.36  /  pCO2: 35    /  pO2: 290   / HCO3: 20    / Base Excess: -5.1  /  SaO2: 99.8  / Lactate: x        RECENT CULTURES:      IMAGING STUDIES:    Parent/Guardian is at the bedside:	[ ] Yes	[ ] No  Patient and Parent/Guardian updated as to the progress/plan of care:	[ ] Yes	[ ] No    [ ] The patient remains in critical and unstable condition, and requires ICU care and monitoring, total critical care time spent by myself, the attending physician was __ minutes, excluding procedure time.  [ ] The patient is improving but requires continued monitoring and adjustment of therapy Interval/Overnight Events:  BP goal maintained with nrepinepherine overngiht.   ===========================RESPIRATORY==========================  RR: 16 (11-20-20 @ 07:00) (13 - 21)  SpO2: 99% (11-20-20 @ 07:00) (96% - 100%)  End Tidal CO2:    Respiratory Support: RA  [ ] Inhaled Nitric Oxide:    [x] Airway Clearance Discussed  Extubation Readiness:  [ ] Not Applicable     [ ] Discussed and Assessed  Comments:    =========================CARDIOVASCULAR========================  HR: 48 (11-20-20 @ 07:00) (45 - 92)  BP: 99/65 (11-19-20 @ 08:00) (99/65 - 99/65)  ABP: 112/63 (11-20-20 @ 07:00) (88/50 - 132/88)  CVP(mm Hg): --  NIRS:  Cardiac Rhythm:	[x] NSR		[ ] Other:    Patient Care Access:  norepinephrine Infusion - Peds 0.02 MICROgram(s)/kG/Min IV Continuous <Continuous>  Comments:    =====================HEMATOLOGY/ONCOLOGY=====================  Transfusions:	[ ] PRBC	[ ] Platelets	[ ] FFP		[ ] Cryoprecipitate  DVT Prophylaxis:  heparin   Infusion - Pediatric 0.064 Unit(s)/kG/Hr IV Continuous <Continuous>  Comments:    ========================INFECTIOUS DISEASE=======================  T(C): 36.6 (11-20-20 @ 05:00), Max: 36.8 (11-19-20 @ 08:00)  T(F): 97.8 (11-20-20 @ 05:00), Max: 98.2 (11-19-20 @ 08:00)  [ ] Cooling [place] being used. Target Temperature:      ==================FLUIDS/ELECTROLYTES/NUTRITION=================  I&O's Summary    19 Nov 2020 07:01  -  20 Nov 2020 07:00  --------------------------------------------------------  IN: 1202.8 mL / OUT: 1984 mL / NET: -781.2 mL      Diet: po ad orion  [ ] NGT		[ ] NDT		[ ] GT		[ ] GJT    cholecalciferol Oral Tab/Cap - Peds 1000 Unit(s) Oral daily  lansoprazole  DR Oral Tab/Cap - Peds 30 milliGRAM(s) Oral daily  polyethylene glycol 3350 Oral Powder - Peds 17 Gram(s) Oral daily  senna 8.6 milliGRAM(s) Oral Tablet - Peds 1 Tablet(s) Oral daily PRN  Comments:    ==========================NEUROLOGY===========================  [ ] SBS:		[ ] OSCAR-1:	[ ] BIS:	[ ] CAPD:  acetaminophen   Oral Liquid - Peds. 650 milliGRAM(s) Oral every 6 hours  diazepam  Oral Tab/Cap - Peds 5 milliGRAM(s) Oral every 6 hours  gabapentin Oral Tab/Cap - Peds 900 milliGRAM(s) Oral at bedtime  oxyCODONE   IR Oral Tab/Cap - Peds 5 milliGRAM(s) Oral every 4 hours PRN  [x] Adequacy of sedation and pain control has been assessed and adjusted  Comments:    OTHER MEDICATIONS:  dexAMETHasone IV Intermittent - Pediatric 10 milliGRAM(s) IV Intermittent every 6 hours    =========================PATIENT CARE==========================  [ ] There are pressure ulcers/areas of breakdown that are being addressed.  [x] Preventative measures are being taken to decrease risk for skin breakdown.  [x] Necessity of urinary, arterial, and venous catheters discussed    =========================PHYSICAL EXAM=========================  GENERAL: In no acute distress  RESPIRATORY: Lungs clear to auscultation bilaterally. Good aeration. No rales, rhonchi, retractions or wheezing. Effort even and unlabored.  CARDIOVASCULAR: Regular rate and rhythm. Normal S1/S2. No murmurs, rubs, or gallop. Capillary refill < 2 seconds. Distal pulses 2+ and equal.  ABDOMEN: Soft, non-distended. Bowel sounds present. No palpable hepatosplenomegaly.  SKIN: No rash.  EXTREMITIES: Warm and well perfused. No gross extremity deformities.  NEUROLOGIC: Alert and oriented. No acute change from baseline exam.    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 Postoperative changes are identified involving the spinal cord as well as the dorsal soft tissues at the level of the laminectomies.    < end of copied text >      Parent/Guardian is at the bedside:	[X ] Yes	[ ] No  Patient and Parent/Guardian updated as to the progress/plan of care:	[ X] Yes	[ ] No    [X ] The patient remains in critical and unstable condition, and requires ICU care and monitoring, total critical care time spent by myself, the attending physician was 35 minutes, excluding procedure time.  [ ] The patient is improving but requires continued monitoring and adjustment of therapy

## 2020-11-21 PROCEDURE — 99233 SBSQ HOSP IP/OBS HIGH 50: CPT

## 2020-11-21 RX ADMIN — Medication 650 MILLIGRAM(S): at 18:01

## 2020-11-21 RX ADMIN — Medication 5 MILLIGRAM(S): at 05:13

## 2020-11-21 RX ADMIN — Medication 4 MILLIGRAM(S): at 17:16

## 2020-11-21 RX ADMIN — Medication 650 MILLIGRAM(S): at 03:08

## 2020-11-21 RX ADMIN — Medication 5 MILLIGRAM(S): at 11:06

## 2020-11-21 RX ADMIN — Medication 5 MILLIGRAM(S): at 17:06

## 2020-11-21 RX ADMIN — Medication 1000 UNIT(S): at 10:15

## 2020-11-21 RX ADMIN — GABAPENTIN 900 MILLIGRAM(S): 400 CAPSULE ORAL at 22:32

## 2020-11-21 RX ADMIN — LANSOPRAZOLE 30 MILLIGRAM(S): 15 CAPSULE, DELAYED RELEASE ORAL at 10:15

## 2020-11-21 RX ADMIN — Medication 650 MILLIGRAM(S): at 17:16

## 2020-11-21 RX ADMIN — Medication 4 MILLIGRAM(S): at 22:30

## 2020-11-21 RX ADMIN — Medication 4 MILLIGRAM(S): at 11:15

## 2020-11-21 RX ADMIN — Medication 4 MILLIGRAM(S): at 05:13

## 2020-11-21 RX ADMIN — Medication 650 MILLIGRAM(S): at 03:45

## 2020-11-21 RX ADMIN — Medication 5 MILLIGRAM(S): at 22:27

## 2020-11-21 NOTE — PROGRESS NOTE PEDS - PROBLEM SELECTOR PLAN 1
1. neurochecks Q4H  2. continue decadron taper  3. PT/OT  4. f/u with  regarding , Tx to rehab.  5. f/u pathology

## 2020-11-21 NOTE — PROGRESS NOTE PEDS - SUBJECTIVE AND OBJECTIVE BOX
POD # 3 s/p T9-12 laminectomy and resection of intramedullary tumor    No significant events overnight.  Patient reports continued decreased sensation in right leg and minimal movement in RLE.    HPI:  19yo F with PMH significant for Neurofibromatosis Type 1, optic glioma, solitary kidney and albuterol allergy now POD #0 from T9-12 laminectomy with intramedullar tumor resection. Lost RLE potentials during procedure. Patient in and out of sedation on arrival to PICU.    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  2020- resection of neurofibroma   PMH: NF1, Optic glioma, focal nodular hyperplasia   Home meds: gabapentin 300 mg in am, 700mg in pm  (18 Nov 2020 15:26)    PAST MEDICAL & SURGICAL HISTORY:  Cafe-au-lait spots  Optic glioma  Nerve sheath tumor  Solitary kidney  s/p nephrectomy  Adrenal Cortex Neoplasm  Neurofibromatosis, Type 1  Neurofibroma  excision of right occipital plexiform neurofibroma, 10/2014  Resection Feb 2020  Central venous catheter in place  2010, out 2011  Other Adrenal Hypofunction  s/p adrenalectomy right 2010  S/P Nephrectomy  right 2010    PHYSICAL EXAM:  AA&0 x 3, speach clear, follows commands, PERRL  CN 2-12 grossly intact  Motor- strength BUE, LLE 5/5                           RLE: HF/HA 1/5, KE4/5 KF 1/5, PF/DF 0/5  Sensory - intact to light touch,   Incision site C/D/I    Diet:  Regular ( x )  NPO       (  )    Drains:  ventriculostomy   (  )  Lumbar drain       (  )  JUAN drain               (  )  Hemovac              (  )    Vital Signs Last 24 Hrs  T(C): 36.8 (21 Nov 2020 05:00), Max: 36.8 (20 Nov 2020 17:00)  T(F): 98.2 (21 Nov 2020 05:00), Max: 98.2 (20 Nov 2020 17:00)  HR: 45 (21 Nov 2020 05:00) (42 - 64)  BP: 98/51 (21 Nov 2020 05:00) (86/63 - 99/62)  BP(mean): 63 (21 Nov 2020 05:00) (57 - 70)  RR: 13 (21 Nov 2020 05:00) (12 - 17)  SpO2: 98% (21 Nov 2020 05:00) (96% - 100%)  I&O's Summary    20 Nov 2020 07:01  -  21 Nov 2020 07:00  --------------------------------------------------------  IN: 1246.6 mL / OUT: 1450 mL / NET: -203.4 mL      MEDICATIONS  (STANDING):  cholecalciferol Oral Tab/Cap - Peds 1000 Unit(s) Oral daily  dexAMETHasone  Oral Tab/Cap - Peds   Oral   dexAMETHasone  Oral Tab/Cap - Peds 4 milliGRAM(s) Oral every 6 hours  diazepam  Oral Tab/Cap - Peds 5 milliGRAM(s) Oral every 6 hours  gabapentin Oral Tab/Cap - Peds 900 milliGRAM(s) Oral at bedtime  lansoprazole  DR Oral Tab/Cap - Peds 30 milliGRAM(s) Oral daily    MEDICATIONS  (PRN):  acetaminophen   Oral Liquid - Peds. 650 milliGRAM(s) Oral every 6 hours PRN Mild Pain (1 - 3), Moderate Pain (4 - 6)  oxyCODONE   IR Oral Tab/Cap - Peds 5 milliGRAM(s) Oral every 4 hours PRN Severe Pain (7 - 10)  polyethylene glycol 3350 Oral Powder - Peds 17 Gram(s) Oral daily PRN Constipation  senna 8.6 milliGRAM(s) Oral Tablet - Peds 1 Tablet(s) Oral daily PRN Constipation    LABS:

## 2020-11-21 NOTE — PROGRESS NOTE PEDS - ASSESSMENT
21y/o  F,  PMhx neurofibramatosis type 1 s/p SOC 2014, optic glioma, solitary kidney s/p nephr/adrenelectomy, T10-11 lami rxn tumor 2/2020, now s/p T9-12 laminectomy with laminoplasty and resection of intramedullary spinal cord tumor, lost RLE motors during the case, immediately post op RLE 0/5, now moving right leg as above

## 2020-11-21 NOTE — PROGRESS NOTE PEDS - SUBJECTIVE AND OBJECTIVE BOX
CC:     Interval/Overnight Events:      VITAL SIGNS:  T(C): 36.8 (11-21-20 @ 05:00), Max: 36.8 (11-20-20 @ 17:00)  HR: 45 (11-21-20 @ 05:00) (42 - 64)  BP: 98/51 (11-21-20 @ 05:00) (86/63 - 99/60)  ABP: 118/53 (11-20-20 @ 09:00) (118/53 - 125/61)  ABP(mean): 74 (11-20-20 @ 09:00) (74 - 84)  RR: 13 (11-21-20 @ 05:00) (12 - 17)  SpO2: 98% (11-21-20 @ 05:00) (96% - 100%)  CVP(mm Hg): --    ==============================RESPIRATORY========================  FiO2: 	    Mechanical Ventilation:       Respiratory Medications:        ============================CARDIOVASCULAR=======================  Cardiac Rhythm:	 NSR    Cardiovascular Medications:        =====================FLUIDS/ELECTROLYTES/NUTRITION===================  I&O's Summary    20 Nov 2020 07:01  -  21 Nov 2020 07:00  --------------------------------------------------------  IN: 1246.6 mL / OUT: 1450 mL / NET: -203.4 mL      Daily           Diet:     Gastrointestinal Medications:  cholecalciferol Oral Tab/Cap - Peds 1000 Unit(s) Oral daily  lansoprazole  DR Oral Tab/Cap - Peds 30 milliGRAM(s) Oral daily  polyethylene glycol 3350 Oral Powder - Peds 17 Gram(s) Oral daily PRN  senna 8.6 milliGRAM(s) Oral Tablet - Peds 1 Tablet(s) Oral daily PRN      Fluid Management:  Fluid Status: [ ] Hypovolemic      [ ] Euvolemic         [ ] Fluid overloaded  Fluid Status Goal for next 24hr.:   [ ] Net Negative    ______   ml       [ ] Net Positive ____        ml      [ ] Intake=Output  [ ] No specific fluid goal  Fluid Intake Plan: ________________  Fluid Removal Plan: [ ] Not applicable  [ ] Diuretic Plan:  [ ] CRRT Plan:  [ ] Unchanged   [ ] No Fluid Removal     [ ] Prescribed weight loss of ___ml/hr.     [ ] Intake=Output       [ ] Fluid removal of ____    ml/hr.    ========================HEMATOLOGIC/ONCOLOGIC====================          Transfusions:	  Hematologic/Oncologic Medications:    DVT Prophylaxis:    ============================INFECTIOUS DISEASE========================  Antimicrobials/Immunologic Medications:            =============================NEUROLOGY============================  Adequacy of sedation and pain control has been assessed and adjusted    SBS:  		  OSCAR-1:	      Neurologic Medications:  acetaminophen   Oral Liquid - Peds. 650 milliGRAM(s) Oral every 6 hours PRN  diazepam  Oral Tab/Cap - Peds 5 milliGRAM(s) Oral every 6 hours  gabapentin Oral Tab/Cap - Peds 900 milliGRAM(s) Oral at bedtime  oxyCODONE   IR Oral Tab/Cap - Peds 5 milliGRAM(s) Oral every 4 hours PRN      OTHER MEDICATIONS:  Endocrine/Metabolic Medications:  dexAMETHasone  Oral Tab/Cap - Peds   Oral   dexAMETHasone  Oral Tab/Cap - Peds 4 milliGRAM(s) Oral every 6 hours    Genitourinary Medications:    Topical/Other Medications:      =======================PATIENT CARE ACCESS DEVICES===================  Peripheral IV  Central Venous Line	R	L	IJ	Fem	SC			Placed:   Arterial Line	R	L	PT	DP	Fem	Rad	Ax	Placed:   PICC:				  Broviac		  Mediport  Urinary Catheter, Date Placed:   Necessity of urinary, arterial, and venous catheters discussed    ============================PHYSICAL EXAM============================  General: 	In no acute distress  Respiratory:	Lungs clear to auscultation bilaterally. Good aeration. No rales,   .		rhonchi, retractions or wheezing. Effort even and unlabored.  CV:		Regular rate and rhythm. Normal S1/S2. No murmurs, rubs, or   .		gallop. Capillary refill < 2 seconds. Distal pulses 2+ and equal.  Abdomen:	Soft, non-distended. Bowel sounds present. No palpable   .		hepatosplenomegaly.  Skin:		No rash.  Extremities:	Warm and well perfused. No gross extremity deformities.  Neurologic:	Alert and oriented. No acute change from baseline exam.    ============================IMAGING STUDIES=========================        =============================SOCIAL=================================  Parent/Guardian is at the bedside  Patient and Parent/Guardian updated as to the progress/plan of care    The patient remains in critical and unstable condition, and requires ICU care and monitoring    The patient is improving but requires continued monitoring and adjustment of therapy    Total critical care time spent by attending physician was 35 minutes excluding procedure time. CC:     Interval/Overnight Events: Continues with paresthesia and weakness of the right leg below the knee.       VITAL SIGNS:  T(C): 36.8 (11-21-20 @ 05:00), Max: 36.8 (11-20-20 @ 17:00)  HR: 45 (11-21-20 @ 05:00) (42 - 64)  BP: 98/51 (11-21-20 @ 05:00) (86/63 - 99/60)  ABP: 118/53 (11-20-20 @ 09:00) (118/53 - 125/61)  ABP(mean): 74 (11-20-20 @ 09:00) (74 - 84)  RR: 13 (11-21-20 @ 05:00) (12 - 17)  SpO2: 98% (11-21-20 @ 05:00) (96% - 100%)      ==============================RESPIRATORY========================  Room air    ============================CARDIOVASCULAR=======================  Cardiac Rhythm:	 Normal sinus rhythm      =====================FLUIDS/ELECTROLYTES/NUTRITION===================  I&O's Summary    20 Nov 2020 07:01  -  21 Nov 2020 07:00  --------------------------------------------------------  IN: 1246.6 mL / OUT: 1450 mL / NET: -203.4 mL      Daily       Diet: Regular    Gastrointestinal Medications:  cholecalciferol Oral Tab/Cap - Peds 1000 Unit(s) Oral daily  lansoprazole  DR Oral Tab/Cap - Peds 30 milliGRAM(s) Oral daily  polyethylene glycol 3350 Oral Powder - Peds 17 Gram(s) Oral daily PRN  senna 8.6 milliGRAM(s) Oral Tablet - Peds 1 Tablet(s) Oral daily PRN      ========================HEMATOLOGIC/ONCOLOGIC====================  No active issues      ============================INFECTIOUS DISEASE========================  No active issues    =============================NEUROLOGY============================  Adequacy of  pain control has been assessed and adjusted          Neurologic Medications:  acetaminophen   Oral Liquid - Peds. 650 milliGRAM(s) Oral every 6 hours PRN  diazepam  Oral Tab/Cap - Peds 5 milliGRAM(s) Oral every 6 hours  gabapentin Oral Tab/Cap - Peds 900 milliGRAM(s) Oral at bedtime  oxyCODONE   IR Oral Tab/Cap - Peds 5 milliGRAM(s) Oral every 4 hours PRN  dexAMETHasone  Oral Tab/Cap - Peds   Oral   dexAMETHasone  Oral Tab/Cap - Peds 4 milliGRAM(s) Oral every 6 hours        =======================PATIENT CARE ACCESS DEVICES===================  Peripheral IV      ============================PHYSICAL EXAM============================  General: 	In no acute distress  Respiratory:	Lungs clear to auscultation bilaterally. Good aeration. No rales,   .		rhonchi, retractions or wheezing. Effort even and unlabored.  CV:		Regular rate and rhythm. Normal S1/S2. No murmurs, rubs, or   .		gallop. Capillary refill < 2 seconds. Distal pulses 2+ and equal.  Abdomen:	Soft, non-distended. Bowel sounds present. No palpable   .		hepatosplenomegaly.  Skin:		No rash.  Extremities:	Warm and well perfused. No gross extremity deformities.  Neurologic:	Alert and oriented. No acute change from baseline exam.    ============================IMAGING STUDIES=========================        =============================SOCIAL=================================  Parent/Guardian is at the bedside  Patient and Parent/Guardian updated as to the progress/plan of care    The patient remains in critical and unstable condition, and requires ICU care and monitoring    The patient is improving but requires continued monitoring and adjustment of therapy    Total critical care time spent by attending physician was 35 minutes excluding procedure time. CC:     Interval/Overnight Events: Continues with paresthesia and weakness of the right leg below the knee.       VITAL SIGNS:  T(C): 36.8 (11-21-20 @ 05:00), Max: 36.8 (11-20-20 @ 17:00)  HR: 45 (11-21-20 @ 05:00) (42 - 64)  BP: 98/51 (11-21-20 @ 05:00) (86/63 - 99/60)  ABP: 118/53 (11-20-20 @ 09:00) (118/53 - 125/61)  ABP(mean): 74 (11-20-20 @ 09:00) (74 - 84)  RR: 13 (11-21-20 @ 05:00) (12 - 17)  SpO2: 98% (11-21-20 @ 05:00) (96% - 100%)      ==============================RESPIRATORY========================  Room air    ============================CARDIOVASCULAR=======================  Cardiac Rhythm:	 Normal sinus rhythm      =====================FLUIDS/ELECTROLYTES/NUTRITION===================  I&O's Summary    20 Nov 2020 07:01  -  21 Nov 2020 07:00  --------------------------------------------------------  IN: 1246.6 mL / OUT: 1450 mL / NET: -203.4 mL      Daily       Diet: Regular    Gastrointestinal Medications:  cholecalciferol Oral Tab/Cap - Peds 1000 Unit(s) Oral daily  lansoprazole  DR Oral Tab/Cap - Peds 30 milliGRAM(s) Oral daily  polyethylene glycol 3350 Oral Powder - Peds 17 Gram(s) Oral daily PRN  senna 8.6 milliGRAM(s) Oral Tablet - Peds 1 Tablet(s) Oral daily PRN      ========================HEMATOLOGIC/ONCOLOGIC====================  No active issues      ============================INFECTIOUS DISEASE========================  No active issues    =============================NEUROLOGY============================  Adequacy of  pain control has been assessed and adjusted(pain free at time of assessment but complaining of numbness of the right leg)          Neurologic Medications:  acetaminophen   Oral Liquid - Peds. 650 milliGRAM(s) Oral every 6 hours PRN  diazepam  Oral Tab/Cap - Peds 5 milliGRAM(s) Oral every 6 hours  gabapentin Oral Tab/Cap - Peds 900 milliGRAM(s) Oral at bedtime  oxyCODONE   IR Oral Tab/Cap - Peds 5 milliGRAM(s) Oral every 4 hours PRN  dexAMETHasone  Oral Tab/Cap - Peds   Oral   dexAMETHasone  Oral Tab/Cap - Peds 4 milliGRAM(s) Oral every 6 hours        =======================PATIENT CARE ACCESS DEVICES===================  Peripheral IV      ============================PHYSICAL EXAM============================  General: 	In no acute distress  Respiratory:	Lungs clear to auscultation bilaterally. Good aeration. No rales,   .		rhonchi, retractions or wheezing. Effort even and unlabored.  CV:		Regular rate and rhythm. Normal S1/S2. No murmurs, rubs, or   .		gallop. Capillary refill < 2 seconds. Distal pulses 2+ and equal.  Abdomen:	Soft, non-distended. Bowel sounds present. No palpable   .		hepatosplenomegaly.  Skin:		No rash.  Extremities:	Warm and well perfused. No gross extremity deformities.  Neurologic:	Alert and oriented. 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 Postoperative changes are identified involving the spinal cord as well as the dorsal soft tissues at the level of the laminectomies.    < end of copied text >        =============================SOCIAL=================================  Parent/Guardian is at the bedside  Patient and Parent/Guardian updated as to the progress/plan of care      The patient  requires continued monitoring and adjustment of therapy     CC:     Interval/Overnight Events: Continues with paresthesia and weakness of the right leg especially below the knee.       VITAL SIGNS:  T(C): 36.8 (11-21-20 @ 05:00), Max: 36.8 (11-20-20 @ 17:00)  HR: 45 (11-21-20 @ 05:00) (42 - 64)  BP: 98/51 (11-21-20 @ 05:00) (86/63 - 99/60)  ABP: 118/53 (11-20-20 @ 09:00) (118/53 - 125/61)  ABP(mean): 74 (11-20-20 @ 09:00) (74 - 84)  RR: 13 (11-21-20 @ 05:00) (12 - 17)  SpO2: 98% (11-21-20 @ 05:00) (96% - 100%)      ==============================RESPIRATORY========================  Room air    ============================CARDIOVASCULAR=======================  Cardiac Rhythm:	 Normal sinus rhythm      =====================FLUIDS/ELECTROLYTES/NUTRITION===================  I&O's Summary    20 Nov 2020 07:01  -  21 Nov 2020 07:00  --------------------------------------------------------  IN: 1246.6 mL / OUT: 1450 mL / NET: -203.4 mL      Daily       Diet: Regular    Gastrointestinal Medications:  cholecalciferol Oral Tab/Cap - Peds 1000 Unit(s) Oral daily  lansoprazole  DR Oral Tab/Cap - Peds 30 milliGRAM(s) Oral daily  polyethylene glycol 3350 Oral Powder - Peds 17 Gram(s) Oral daily PRN  senna 8.6 milliGRAM(s) Oral Tablet - Peds 1 Tablet(s) Oral daily PRN      ========================HEMATOLOGIC/ONCOLOGIC====================  No active issues      ============================INFECTIOUS DISEASE========================  No active issues    =============================NEUROLOGY============================  Adequacy of  pain control has been assessed and adjusted(pain free at time of assessment but complaining of numbness of the right leg)          Neurologic Medications:  acetaminophen   Oral Liquid - Peds. 650 milliGRAM(s) Oral every 6 hours PRN  diazepam  Oral Tab/Cap - Peds 5 milliGRAM(s) Oral every 6 hours  gabapentin Oral Tab/Cap - Peds 900 milliGRAM(s) Oral at bedtime  oxyCODONE   IR Oral Tab/Cap - Peds 5 milliGRAM(s) Oral every 4 hours PRN  dexAMETHasone  Oral Tab/Cap - Peds   Oral   dexAMETHasone  Oral Tab/Cap - Peds 4 milliGRAM(s) Oral every 6 hours        =======================PATIENT CARE ACCESS DEVICES===================  Peripheral IV      ============================PHYSICAL EXAM============================  General: 	In no acute distress  Respiratory:	Lungs clear to auscultation bilaterally. Good aeration. No rales,   .		rhonchi, retractions or wheezing. Effort even and unlabored.  CV:		Regular rate and rhythm. Normal S1/S2. No murmurs, rubs, or   .		gallop. Capillary refill < 2 seconds. Distal pulses 2+ and equal.  Abdomen:	Soft, non-distended. Bowel sounds present. No palpable   .		hepatosplenomegaly.  Skin:		No rash.  Extremities:	Warm and well perfused. No gross extremity deformities.  Neurologic:	Alert and oriented. 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 Postoperative changes are identified involving the spinal cord as well as the dorsal soft tissues at the level of the laminectomies.    < end of copied text >        =============================SOCIAL=================================  Parent/Guardian is at the bedside  Patient and Parent/Guardian updated as to the progress/plan of care      The patient  requires continued monitoring and adjustment of therapy

## 2020-11-21 NOTE — PROGRESS NOTE PEDS - ASSESSMENT
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 OR course complicated by loss of potentials on right side, so MAP goal increased to maintain spinal perfusion. Post operative pain well controlled. Oncology team not yet involved as pathology pending.     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 OR course complicated by loss of potentials on right side, so MAP goal increased to maintain spinal perfusion. Post operative pain well controlled. Oncology team not yet involved as pathology pending. Right leg paresis persists.     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 OR course complicated by loss of potentials on right side, so MAP goal increased to maintain spinal perfusion. Post operative pain well controlled. Oncology team not yet involved as pathology pending. Right leg paresis and paresthesias persist.     Resp:  no acute issues     CV:   MAP liberalized per neurosurgery today- normal BP for age     FENGI:  po ad orion    Neuro:  Decadron now 4 mg every 6 hours  gabapentin (per home)  Valium, oxy   MRI done 11/19  PT/OT actively involved--inpatient rehab being recommended and currently being explored-- aware and working on placement     ID  ancef completed

## 2020-11-22 PROCEDURE — 99233 SBSQ HOSP IP/OBS HIGH 50: CPT

## 2020-11-22 RX ORDER — DIAZEPAM 5 MG
5 TABLET ORAL EVERY 6 HOURS
Refills: 0 | Status: DISCONTINUED | OUTPATIENT
Start: 2020-11-22 | End: 2020-11-24

## 2020-11-22 RX ADMIN — Medication 650 MILLIGRAM(S): at 14:12

## 2020-11-22 RX ADMIN — GABAPENTIN 900 MILLIGRAM(S): 400 CAPSULE ORAL at 21:36

## 2020-11-22 RX ADMIN — Medication 3 MILLIGRAM(S): at 21:36

## 2020-11-22 RX ADMIN — Medication 5 MILLIGRAM(S): at 05:58

## 2020-11-22 RX ADMIN — Medication 5 MILLIGRAM(S): at 11:09

## 2020-11-22 RX ADMIN — Medication 4 MILLIGRAM(S): at 05:58

## 2020-11-22 RX ADMIN — LANSOPRAZOLE 30 MILLIGRAM(S): 15 CAPSULE, DELAYED RELEASE ORAL at 11:15

## 2020-11-22 RX ADMIN — Medication 1000 UNIT(S): at 11:14

## 2020-11-22 RX ADMIN — Medication 3 MILLIGRAM(S): at 13:50

## 2020-11-22 RX ADMIN — Medication 650 MILLIGRAM(S): at 19:46

## 2020-11-22 RX ADMIN — Medication 650 MILLIGRAM(S): at 14:32

## 2020-11-22 RX ADMIN — Medication 650 MILLIGRAM(S): at 20:50

## 2020-11-22 NOTE — PROGRESS NOTE PEDS - ASSESSMENT
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 OR course complicated by loss of potentials on right side, so MAP goal increased to maintain spinal perfusion. Post operative pain well controlled. Oncology team not yet involved as pathology pending. Right leg paresis and paresthesias persist.     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 OR course complicated by loss of potentials on right side, so MAP goal increased to maintain spinal perfusion. Post operative pain well controlled. Oncology team not yet involved as pathology pending. Right leg paresis and paresthesias persist--numbness improved.     Resp:  no acute issues     CV:   MAP liberalized per neurosurgery today- normal BP for age     FENGI:  po ad orion    Neuro:  Decadron now 3 mg every 8 hours  gabapentin (per home)  Valium, oxy   MRI done 11/19  PT/OT actively involved--inpatient rehab being recommended and currently being explored-- aware and working on placement     ID  ancef completed

## 2020-11-22 NOTE — PROGRESS NOTE PEDS - SUBJECTIVE AND OBJECTIVE BOX
CC:     Interval/Overnight Events:      VITAL SIGNS:  T(C): 36.7 (11-22-20 @ 05:00), Max: 36.8 (11-21-20 @ 14:00)  HR: 43 (11-22-20 @ 05:00) (43 - 52)  BP: 113/58 (11-22-20 @ 05:00) (90/48 - 113/58)  ABP: --  ABP(mean): --  RR: 15 (11-22-20 @ 05:00) (13 - 17)  SpO2: 98% (11-22-20 @ 05:00) (97% - 100%)  CVP(mm Hg): --    ==============================RESPIRATORY========================  FiO2: 	    Mechanical Ventilation:       Respiratory Medications:        ============================CARDIOVASCULAR=======================  Cardiac Rhythm:	 NSR    Cardiovascular Medications:        =====================FLUIDS/ELECTROLYTES/NUTRITION===================  I&O's Summary    21 Nov 2020 07:01  -  22 Nov 2020 07:00  --------------------------------------------------------  IN: 1080 mL / OUT: 1800 mL / NET: -720 mL      Daily           Diet:     Gastrointestinal Medications:  cholecalciferol Oral Tab/Cap - Peds 1000 Unit(s) Oral daily  lansoprazole  DR Oral Tab/Cap - Peds 30 milliGRAM(s) Oral daily  polyethylene glycol 3350 Oral Powder - Peds 17 Gram(s) Oral daily PRN  senna 8.6 milliGRAM(s) Oral Tablet - Peds 1 Tablet(s) Oral daily PRN      Fluid Management:  Fluid Status: [ ] Hypovolemic      [ ] Euvolemic         [ ] Fluid overloaded  Fluid Status Goal for next 24hr.:   [ ] Net Negative    ______   ml       [ ] Net Positive ____        ml      [ ] Intake=Output  [ ] No specific fluid goal  Fluid Intake Plan: ________________  Fluid Removal Plan: [ ] Not applicable  [ ] Diuretic Plan:  [ ] CRRT Plan:  [ ] Unchanged   [ ] No Fluid Removal     [ ] Prescribed weight loss of ___ml/hr.     [ ] Intake=Output       [ ] Fluid removal of ____    ml/hr.    ========================HEMATOLOGIC/ONCOLOGIC====================          Transfusions:	  Hematologic/Oncologic Medications:    DVT Prophylaxis:    ============================INFECTIOUS DISEASE========================  Antimicrobials/Immunologic Medications:            =============================NEUROLOGY============================  Adequacy of sedation and pain control has been assessed and adjusted    SBS:  		  OSCAR-1:	      Neurologic Medications:  acetaminophen   Oral Liquid - Peds. 650 milliGRAM(s) Oral every 6 hours PRN  diazepam  Oral Tab/Cap - Peds 5 milliGRAM(s) Oral every 6 hours  gabapentin Oral Tab/Cap - Peds 900 milliGRAM(s) Oral at bedtime  oxyCODONE   IR Oral Tab/Cap - Peds 5 milliGRAM(s) Oral every 4 hours PRN      OTHER MEDICATIONS:  Endocrine/Metabolic Medications:  dexAMETHasone  Oral Tab/Cap - Peds 3 milliGRAM(s) Oral every 8 hours  dexAMETHasone  Oral Tab/Cap - Peds   Oral     Genitourinary Medications:    Topical/Other Medications:      =======================PATIENT CARE ACCESS DEVICES===================  Peripheral IV  Central Venous Line	R	L	IJ	Fem	SC			Placed:   Arterial Line	R	L	PT	DP	Fem	Rad	Ax	Placed:   PICC:				  Broviac		  Mediport  Urinary Catheter, Date Placed:   Necessity of urinary, arterial, and venous catheters discussed    ============================PHYSICAL EXAM============================  General: 	In no acute distress  Respiratory:	Lungs clear to auscultation bilaterally. Good aeration. No rales,   .		rhonchi, retractions or wheezing. Effort even and unlabored.  CV:		Regular rate and rhythm. Normal S1/S2. No murmurs, rubs, or   .		gallop. Capillary refill < 2 seconds. Distal pulses 2+ and equal.  Abdomen:	Soft, non-distended. Bowel sounds present. No palpable   .		hepatosplenomegaly.  Skin:		No rash.  Extremities:	Warm and well perfused. No gross extremity deformities.  Neurologic:	Alert and oriented. No acute change from baseline exam.    ============================IMAGING STUDIES=========================        =============================SOCIAL=================================  Parent/Guardian is at the bedside  Patient and Parent/Guardian updated as to the progress/plan of care    The patient remains in critical and unstable condition, and requires ICU care and monitoring    The patient is improving but requires continued monitoring and adjustment of therapy    Total critical care time spent by attending physician was 35 minutes excluding procedure time. CC:     Interval/Overnight Events: Bradycardia whilst sleeping      VITAL SIGNS:  T(C): 36.7 (11-22-20 @ 05:00), Max: 36.8 (11-21-20 @ 14:00)  HR: 43 (11-22-20 @ 05:00) (43 - 52)  BP: 113/58 (11-22-20 @ 05:00) (90/48 - 113/58)  RR: 15 (11-22-20 @ 05:00) (13 - 17)  SpO2: 98% (11-22-20 @ 05:00) (97% - 100%)      ==============================RESPIRATORY========================  Room air    ============================CARDIOVASCULAR=======================  Cardiac Rhythm:	 Normal sinus rhythm    =====================FLUIDS/ELECTROLYTES/NUTRITION===================  I&O's Summary    21 Nov 2020 07:01  -  22 Nov 2020 07:00  --------------------------------------------------------  IN: 1080 mL / OUT: 1800 mL / NET: -720 mL      Daily       Diet: Regular    Gastrointestinal Medications:  cholecalciferol Oral Tab/Cap - Peds 1000 Unit(s) Oral daily  lansoprazole  DR Oral Tab/Cap - Peds 30 milliGRAM(s) Oral daily  polyethylene glycol 3350 Oral Powder - Peds 17 Gram(s) Oral daily PRN  senna 8.6 milliGRAM(s) Oral Tablet - Peds 1 Tablet(s) Oral daily PRN      ========================HEMATOLOGIC/ONCOLOGIC====================  No active issues      ============================INFECTIOUS DISEASE========================  No active issues      =============================NEUROLOGY============================  Adequacy of  pain control has been assessed and adjusted      Neurologic Medications:  acetaminophen   Oral Liquid - Peds. 650 milliGRAM(s) Oral every 6 hours PRN  diazepam  Oral Tab/Cap - Peds 5 milliGRAM(s) Oral every 6 hours  gabapentin Oral Tab/Cap - Peds 900 milliGRAM(s) Oral at bedtime  oxyCODONE   IR Oral Tab/Cap - Peds 5 milliGRAM(s) Oral every 4 hours PRN    dexAMETHasone  Oral Tab/Cap - Peds 3 milliGRAM(s) Oral every 8 hours      =======================PATIENT CARE ACCESS DEVICES===================  Peripheral IV      ============================PHYSICAL EXAM============================  General: 	In no acute distress  Respiratory:	Lungs clear to auscultation bilaterally. Good aeration. No rales,   .		rhonchi, retractions or wheezing. Effort even and unlabored.  CV:		Regular rate and rhythm. Normal S1/S2. No murmurs, rubs, or   .		gallop. Capillary refill < 2 seconds. Distal pulses 2+ and equal.  Abdomen:	Soft, non-distended. Bowel sounds present. No palpable   .		hepatosplenomegaly.  Skin:		No rash.  Extremities:	Warm and well perfused. No gross extremity deformities.  Neurologic:	Alert and oriented. Right lower extremity persists    ============================IMAGING STUDIES=========================        =============================SOCIAL=================================  Parent/Guardian is at the bedside  Patient and Parent/Guardian updated as to the progress/plan of care      The patient is improving but requires continued monitoring and adjustment of therapy

## 2020-11-22 NOTE — DIETITIAN INITIAL EVALUATION PEDIATRIC - OTHER INFO
20 y.o. 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 OR course complicated by loss of potentials on right side, R leg paresis and paresthesias persist, per MD notes.   Spoke with Cate and mom at time of visit, reports fair/good appetite and po intake. No N/V/GI distress although does have loose BM. No difficulty chewing/swallowing. No abdominal discomfort/bloating. No diet restrictions or food allergies, +lactose intolerant. Reported UBW of 106 lb. Report 4-5 lb wt loss since last surgery in Feb. Noted past weights: 1/14: 48.6 kg, 2/4: 48.1 kg, 11/18: 46.6 kg. 1.6 kg loss x 9 mos. Oral nutrition supplements offered; Cate willing to try Ensure Clear. No other food preferences at this time.

## 2020-11-22 NOTE — DIETITIAN INITIAL EVALUATION PEDIATRIC - NS AS NUTRI INTERV MEDICAL AND FOOD SUPPLEMENTS
1. continue regular diet as tolerated, obtain food preferences 2. Ensure Clear BID (240 kcal, 8g pro each) 3. monitor po intake, weights, labs/Commercial beverage

## 2020-11-22 NOTE — DIETITIAN INITIAL EVALUATION PEDIATRIC - PERTINENT PMH/PSH
MEDICATIONS  (STANDING):  cholecalciferol Oral Tab/Cap - Peds 1000 Unit(s) Oral daily  dexAMETHasone  Oral Tab/Cap - Peds 3 milliGRAM(s) Oral every 8 hours  dexAMETHasone  Oral Tab/Cap - Peds   Oral   diazepam  Oral Tab/Cap - Peds 5 milliGRAM(s) Oral every 6 hours  gabapentin Oral Tab/Cap - Peds 900 milliGRAM(s) Oral at bedtime  lansoprazole  DR Oral Tab/Cap - Peds 30 milliGRAM(s) Oral daily

## 2020-11-22 NOTE — PROGRESS NOTE PEDS - SUBJECTIVE AND OBJECTIVE BOX
PAST 24hr EVENTS: patient stable overnight, RLE slowly improving     PHYSICAL EXAM:   Vital Signs Last 24 Hrs  T(C): 36.7 (22 Nov 2020 05:00), Max: 36.8 (21 Nov 2020 14:00)  T(F): 98 (22 Nov 2020 05:00), Max: 98.2 (21 Nov 2020 14:00)  HR: 43 (22 Nov 2020 05:00) (43 - 52)  BP: 113/58 (22 Nov 2020 05:00) (90/48 - 113/58)  BP(mean): 70 (22 Nov 2020 05:00) (54 - 70)  RR: 15 (22 Nov 2020 05:00) (13 - 17)  SpO2: 98% (22 Nov 2020 05:00) (97% - 100%)    AA&0 x 3, speech clear, follows commands, PERRL  CN 2-12 grossly intact  Motor- strength BUE, LLE 5/5                           RLE: HF/HA 1/5, KE4/5 KF 1/5, PF/DF 0/5  Sensory - intact to light touch,   Incision site C/D/I    I&O's Summary    21 Nov 2020 07:01  -  22 Nov 2020 07:00  --------------------------------------------------------  IN: 1080 mL / OUT: 1800 mL / NET: -720 mL    MEDICATIONS  (STANDING):  cholecalciferol Oral Tab/Cap - Peds 1000 Unit(s) Oral daily  dexAMETHasone  Oral Tab/Cap - Peds 3 milliGRAM(s) Oral every 8 hours  dexAMETHasone  Oral Tab/Cap - Peds   Oral   diazepam  Oral Tab/Cap - Peds 5 milliGRAM(s) Oral every 6 hours  gabapentin Oral Tab/Cap - Peds 900 milliGRAM(s) Oral at bedtime  lansoprazole  DR Oral Tab/Cap - Peds 30 milliGRAM(s) Oral daily    MEDICATIONS  (PRN):  acetaminophen   Oral Liquid - Peds. 650 milliGRAM(s) Oral every 6 hours PRN Mild Pain (1 - 3), Moderate Pain (4 - 6)  oxyCODONE   IR Oral Tab/Cap - Peds 5 milliGRAM(s) Oral every 4 hours PRN Severe Pain (7 - 10)  polyethylene glycol 3350 Oral Powder - Peds 17 Gram(s) Oral daily PRN Constipation  senna 8.6 milliGRAM(s) Oral Tablet - Peds 1 Tablet(s) Oral daily PRN Constipation      NPO STATUS:   REASON: [] OR procedure   [] imaging with sedation   [] medical need    [] other   RN Informed: [] Yes [] No  Family informed and educated [] Yes [] No

## 2020-11-22 NOTE — PROGRESS NOTE PEDS - ASSESSMENT
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 neurochecks Q4H  2. continue decadron taper  3. PT/OT  4. f/u with  regarding Tx to rehab.  5. f/u pathology

## 2020-11-23 PROCEDURE — 99233 SBSQ HOSP IP/OBS HIGH 50: CPT

## 2020-11-23 RX ORDER — OXYCODONE HYDROCHLORIDE 5 MG/1
5 TABLET ORAL EVERY 4 HOURS
Refills: 0 | Status: DISCONTINUED | OUTPATIENT
Start: 2020-11-23 | End: 2020-11-23

## 2020-11-23 RX ADMIN — LANSOPRAZOLE 30 MILLIGRAM(S): 15 CAPSULE, DELAYED RELEASE ORAL at 09:20

## 2020-11-23 RX ADMIN — GABAPENTIN 900 MILLIGRAM(S): 400 CAPSULE ORAL at 22:57

## 2020-11-23 RX ADMIN — Medication 3 MILLIGRAM(S): at 21:09

## 2020-11-23 RX ADMIN — POLYETHYLENE GLYCOL 3350 17 GRAM(S): 17 POWDER, FOR SOLUTION ORAL at 16:45

## 2020-11-23 RX ADMIN — Medication 5 MILLIGRAM(S): at 20:04

## 2020-11-23 RX ADMIN — Medication 650 MILLIGRAM(S): at 20:05

## 2020-11-23 RX ADMIN — Medication 650 MILLIGRAM(S): at 21:09

## 2020-11-23 RX ADMIN — Medication 1000 UNIT(S): at 10:30

## 2020-11-23 RX ADMIN — Medication 3 MILLIGRAM(S): at 13:50

## 2020-11-23 RX ADMIN — Medication 3 MILLIGRAM(S): at 05:50

## 2020-11-23 NOTE — PROGRESS NOTE PEDS - ASSESSMENT
20y female s/p laminectomy for resection of intramedullary spinal cord tumor   -PT/PMR rehab transfer planning  -Awaiting pathology report

## 2020-11-23 NOTE — PROGRESS NOTE PEDS - SUBJECTIVE AND OBJECTIVE BOX
CC:     Interval/Overnight Events:      VITAL SIGNS:  T(C): 36.9 (11-23-20 @ 05:00), Max: 36.9 (11-23-20 @ 05:00)  HR: 45 (11-23-20 @ 05:00) (45 - 61)  BP: 100/67 (11-23-20 @ 05:00) (90/43 - 102/56)  ABP: --  ABP(mean): --  RR: 17 (11-23-20 @ 05:00) (14 - 17)  SpO2: 97% (11-23-20 @ 05:00) (97% - 100%)  CVP(mm Hg): --    ==============================RESPIRATORY========================  FiO2: 	    Mechanical Ventilation:       Respiratory Medications:        ============================CARDIOVASCULAR=======================  Cardiac Rhythm:	 NSR    Cardiovascular Medications:        =====================FLUIDS/ELECTROLYTES/NUTRITION===================  I&O's Summary    22 Nov 2020 07:01  -  23 Nov 2020 07:00  --------------------------------------------------------  IN: 1000 mL / OUT: 1036 mL / NET: -36 mL      Daily Weight: 46.6 (22 Nov 2020 11:43)          Diet:     Gastrointestinal Medications:  cholecalciferol Oral Tab/Cap - Peds 1000 Unit(s) Oral daily  lansoprazole  DR Oral Tab/Cap - Peds 30 milliGRAM(s) Oral daily  polyethylene glycol 3350 Oral Powder - Peds 17 Gram(s) Oral daily PRN  senna 8.6 milliGRAM(s) Oral Tablet - Peds 1 Tablet(s) Oral daily PRN      Fluid Management:  Fluid Status: [ ] Hypovolemic      [ ] Euvolemic         [ ] Fluid overloaded  Fluid Status Goal for next 24hr.:   [ ] Net Negative    ______   ml       [ ] Net Positive ____        ml      [ ] Intake=Output  [ ] No specific fluid goal  Fluid Intake Plan: ________________  Fluid Removal Plan: [ ] Not applicable  [ ] Diuretic Plan:  [ ] CRRT Plan:  [ ] Unchanged   [ ] No Fluid Removal     [ ] Prescribed weight loss of ___ml/hr.     [ ] Intake=Output       [ ] Fluid removal of ____    ml/hr.    ========================HEMATOLOGIC/ONCOLOGIC====================          Transfusions:	  Hematologic/Oncologic Medications:    DVT Prophylaxis:    ============================INFECTIOUS DISEASE========================  Antimicrobials/Immunologic Medications:            =============================NEUROLOGY============================  Adequacy of sedation and pain control has been assessed and adjusted    SBS:  		  OSCAR-1:	      Neurologic Medications:  acetaminophen   Oral Liquid - Peds. 650 milliGRAM(s) Oral every 6 hours PRN  diazepam  Oral Tab/Cap - Peds 5 milliGRAM(s) Oral every 6 hours PRN  gabapentin Oral Tab/Cap - Peds 900 milliGRAM(s) Oral at bedtime  oxyCODONE   IR Oral Tab/Cap - Peds 5 milliGRAM(s) Oral every 4 hours PRN      OTHER MEDICATIONS:  Endocrine/Metabolic Medications:  dexAMETHasone  Oral Tab/Cap - Peds 3 milliGRAM(s) Oral every 8 hours  dexAMETHasone  Oral Tab/Cap - Peds   Oral     Genitourinary Medications:    Topical/Other Medications:      =======================PATIENT CARE ACCESS DEVICES===================  Peripheral IV  Central Venous Line	R	L	IJ	Fem	SC			Placed:   Arterial Line	R	L	PT	DP	Fem	Rad	Ax	Placed:   PICC:				  Broviac		  Mediport  Urinary Catheter, Date Placed:   Necessity of urinary, arterial, and venous catheters discussed    ============================PHYSICAL EXAM============================  General: 	In no acute distress  Respiratory:	Lungs clear to auscultation bilaterally. Good aeration. No rales,   .		rhonchi, retractions or wheezing. Effort even and unlabored.  CV:		Regular rate and rhythm. Normal S1/S2. No murmurs, rubs, or   .		gallop. Capillary refill < 2 seconds. Distal pulses 2+ and equal.  Abdomen:	Soft, non-distended. Bowel sounds present. No palpable   .		hepatosplenomegaly.  Skin:		No rash.  Extremities:	Warm and well perfused. No gross extremity deformities.  Neurologic:	Alert and oriented. No acute change from baseline exam.    ============================IMAGING STUDIES=========================        =============================SOCIAL=================================  Parent/Guardian is at the bedside  Patient and Parent/Guardian updated as to the progress/plan of care    The patient remains in critical and unstable condition, and requires ICU care and monitoring    The patient is improving but requires continued monitoring and adjustment of therapy    Total critical care time spent by attending physician was 35 minutes excluding procedure time. CC:     Interval/Overnight Events: None      VITAL SIGNS:  T(C): 36.9 (11-23-20 @ 05:00), Max: 36.9 (11-23-20 @ 05:00)  HR: 45 (11-23-20 @ 05:00) (45 - 61)  BP: 100/67 (11-23-20 @ 05:00) (90/43 - 102/56)  RR: 17 (11-23-20 @ 05:00) (14 - 17)  SpO2: 97% (11-23-20 @ 05:00) (97% - 100%)      ==============================RESPIRATORY========================  Room air    ============================CARDIOVASCULAR=======================  Cardiac Rhythm:	 Normal sinus rhythm      =====================FLUIDS/ELECTROLYTES/NUTRITION===================  I&O's Summary    22 Nov 2020 07:01  -  23 Nov 2020 07:00  --------------------------------------------------------  IN: 1000 mL / OUT: 1036 mL / NET: -36 mL      Daily Weight: 46.6 (22 Nov 2020 11:43)      Diet: Regular    Gastrointestinal Medications:  cholecalciferol Oral Tab/Cap - Peds 1000 Unit(s) Oral daily  lansoprazole  DR Oral Tab/Cap - Peds 30 milliGRAM(s) Oral daily  polyethylene glycol 3350 Oral Powder - Peds 17 Gram(s) Oral daily PRN  senna 8.6 milliGRAM(s) Oral Tablet - Peds 1 Tablet(s) Oral daily PRN        ========================HEMATOLOGIC/ONCOLOGIC====================  No active issues    Venodynes when in bed  ============================INFECTIOUS DISEASE========================  No active issues      =============================NEUROLOGY============================  Adequacy of pain control has been assessed and adjusted      Neurologic Medications:  acetaminophen   Oral Liquid - Peds. 650 milliGRAM(s) Oral every 6 hours PRN  diazepam  Oral Tab/Cap - Peds 5 milliGRAM(s) Oral every 6 hours PRN  gabapentin Oral Tab/Cap - Peds 900 milliGRAM(s) Oral at bedtime  oxyCODONE   IR Oral Tab/Cap - Peds 5 milliGRAM(s) Oral every 4 hours PRN    dexAMETHasone  Oral Tab/Cap - Peds 3 milliGRAM(s) Oral every 8 hours  dexAMETHasone  Oral Tab/Cap - Peds   Oral         =======================PATIENT CARE ACCESS DEVICES===================  Peripheral IV      ============================PHYSICAL EXAM============================  General: 	In no acute distress  Respiratory:	Lungs clear to auscultation bilaterally. Good aeration. No rales,   .		rhonchi, retractions or wheezing. Effort even and unlabored.  CV:		Regular rate and rhythm. Normal S1/S2. No murmurs, rubs, or   .		gallop. Capillary refill < 2 seconds. Distal pulses 2+ and equal.  Abdomen:	Soft, non-distended. Bowel sounds present. No palpable   .		hepatosplenomegaly.  Skin:		No rash.  Extremities:	Warm and well perfused. No gross extremity deformities.  Neurologic:	Alert and oriented. No acute change from baseline exam.    ============================IMAGING STUDIES=========================        =============================SOCIAL=================================  Parent/Guardian is at the bedside  Patient and Parent/Guardian updated as to the progress/plan of care      The patient is improving but requires continued monitoring and adjustment of therapy

## 2020-11-23 NOTE — PROGRESS NOTE PEDS - ASSESSMENT
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 OR course complicated by loss of potentials on right side, so MAP goal increased to maintain spinal perfusion. Post operative pain well controlled. Oncology team not yet involved as pathology pending. Right leg paresis and paresthesias persist--numbness improved.     Resp:  no acute issues     CV:   MAP liberalized per neurosurgery today- normal BP for age     FENGI:  po ad orion    Neuro:  Decadron now 3 mg every 8 hours  gabapentin (per home)  Valium, oxy   MRI done 11/19  PT/OT actively involved--inpatient rehab being recommended and currently being explored-- aware and working on placement     ID  ancef completed   20 year old female with NF I, nephrectomy, several neural sheath tumors previously who is  s/p medullary tumor resection and laminectomy T9-T12 on 11/18. OR course complicated by loss of potentials on right side, so MAP goal increased to maintain spinal perfusion. Post operative pain well controlled. Oncology team not yet involved as pathology pending. Right leg paresis and paresthesias persist--numbness improved.     Resp:  no acute issues     CV:   MAP liberalized per neurosurgery today- normal BP for age     FENGI:  po ad orion    Neuro:  Decadron now 3 mg every 8 hours  gabapentin (per home)  Acetaminophen, Valium, oxy PRN  MRI done 11/19  PT/OT actively involved--inpatient rehab being recommended and currently being explored-- aware and working on placement     ID  ancef completed

## 2020-11-24 ENCOUNTER — TRANSCRIPTION ENCOUNTER (OUTPATIENT)
Age: 20
End: 2020-11-24

## 2020-11-24 VITALS
HEART RATE: 50 BPM | SYSTOLIC BLOOD PRESSURE: 100 MMHG | OXYGEN SATURATION: 98 % | DIASTOLIC BLOOD PRESSURE: 58 MMHG | RESPIRATION RATE: 14 BRPM | TEMPERATURE: 98 F

## 2020-11-24 PROCEDURE — 99233 SBSQ HOSP IP/OBS HIGH 50: CPT

## 2020-11-24 PROCEDURE — 99221 1ST HOSP IP/OBS SF/LOW 40: CPT

## 2020-11-24 RX ORDER — DEXAMETHASONE 0.5 MG/5ML
1 ELIXIR ORAL
Qty: 10 | Refills: 0
Start: 2020-11-24

## 2020-11-24 RX ORDER — OXYCODONE HYDROCHLORIDE 5 MG/1
1 TABLET ORAL
Qty: 0 | Refills: 0 | DISCHARGE
Start: 2020-11-24

## 2020-11-24 RX ORDER — OXYCODONE HYDROCHLORIDE 5 MG/1
1 TABLET ORAL
Qty: 8 | Refills: 0
Start: 2020-11-24 | End: 2020-11-25

## 2020-11-24 RX ADMIN — Medication 3 MILLIGRAM(S): at 05:00

## 2020-11-24 RX ADMIN — Medication 2 MILLIGRAM(S): at 17:00

## 2020-11-24 RX ADMIN — Medication 1000 UNIT(S): at 10:00

## 2020-11-24 RX ADMIN — LANSOPRAZOLE 30 MILLIGRAM(S): 15 CAPSULE, DELAYED RELEASE ORAL at 10:00

## 2020-11-24 NOTE — CONSULT NOTE PEDS - SUBJECTIVE AND OBJECTIVE BOX
HPI:  20 year-old female with past medical history significant for Neurofibromatosis Type 1, optic glioma, left solitary kidney and albuterol allergy presented to hospital for scheduled T9-12 laminectomy with intramedullar tumor resection 11/18/20. During the surgery, RLE potentials were lost. Patient was unable to move her right leg postop and was transferred to PICU. She is being treated with a Decadron taper and maintaining MAP >70. Pain has been controlled mainly with Gabapentin and Tylenol, with PRN Valium.     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    Today, patient was able to move her right leg slightly and was able to ambulate to the bathroom across the gutierrez with a rolling walker, right leg brace, and her mother supervising. She feels pain and spasms have been well controlled with current medication regimen. She still notes her lower leg feels asleep.       SOCIAL HISTORY      FUNCTIONAL PROGRESS  PT 11/23/20: minimum assist with gait using rolling walker and RLE AFO at 25 feet with 1 person assist  11/21/20: moderate assist with bed mobility and sit-to-stand transfers, minimum assist with stand-to-sit transfer    OT 11/24/20: contact guard with toilet transfer training using rolling walker and 1 person assist, minimum assist with toilet training.    REVIEW OF SYSTEMS  Constitutional - No fever,  No fatigue  HEENT - No vertigo, No neck pain  Neurological - No headaches, No memory loss, +loss of strength in right leg, +numbness in right leg, No tremors  Skin - No rashes, No lesions   Musculoskeletal - No joint pain, No joint swelling, No muscle pain  Psychiatric - No depression, No anxiety    VITALS  T(C): 36.6 (11-24-20 @ 08:00), Max: 36.8 (11-23-20 @ 16:45)  HR: 47 (11-24-20 @ 08:00) (43 - 80)  BP: 105/61 (11-24-20 @ 08:00) (93/48 - 105/61)  RR: 15 (11-24-20 @ 08:00) (14 - 18)  SpO2: 98% (11-24-20 @ 08:00) (97% - 100%)  Wt(kg): --    MEDICATIONS   acetaminophen   Oral Liquid - Peds. 650 milliGRAM(s) every 6 hours PRN  cholecalciferol Oral Tab/Cap - Peds 1000 Unit(s) daily  dexAMETHasone  Oral Tab/Cap - Peds 2 milliGRAM(s) every 12 hours  dexAMETHasone  Oral Tab/Cap - Peds     diazepam  Oral Tab/Cap - Peds 5 milliGRAM(s) every 6 hours PRN  gabapentin Oral Tab/Cap - Peds 900 milliGRAM(s) at bedtime  lansoprazole  DR Oral Tab/Cap - Peds 30 milliGRAM(s) daily  polyethylene glycol 3350 Oral Powder - Peds 17 Gram(s) daily PRN  senna 8.6 milliGRAM(s) Oral Tablet - Peds 1 Tablet(s) daily PRN      RECENT LABS/IMAGING    MRI thoracic spine w/wo IV contrast 11/19/20:  Comparison is made with the prior MRI of 9/16/2020.    In the interval since the prior examination there has been a T9 through T 12 laminectomy with removal of the intramedullary mass previously identified. There is less expansion of the cord from T10 through T11 with low signal intensity within the central portion of the cord consistent with fluid and postoperative changes. There is no definite evidence of residual neoplasm. There is no significant hemorrhage. A small amount of normal expected postoperative changes are identified at the laminectomy site. After contrast administration there is normal osseous and vascular enhancement.      ---------  PHYSICAL EXAM  Constitutional - NAD, Comfortable  Extremities - No C/C/E, No calf tenderness  Neurologic Exam -                    Cognitive - AAOx3     Communication - Fluent     Motor - No focal deficits     Sensory - Intact to LT  Psychiatric - Mood WNL, Affect WNL        HPI:  Patient is a 20 year-old female with past medical history significant for Neurofibromatosis Type 1, optic glioma, left solitary kidney and albuterol allergy presented to hospital for scheduled T9-12 laminectomy with intramedullar tumor resection 11/18/20. During the surgery, RLE potentials were lost. Patient was unable to move her right leg postop and was transferred to PICU. She is being treated with a Decadron taper and maintaining MAP >70. Pain has been controlled mainly with Gabapentin and Tylenol, with PRN Valium. She has been receiving PT and OT during hospital course and given a right AFO.    PM&R was consulted for right lower extremity weakness and to assist with disposition.    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      Today, patient was able to move her right leg slightly and was able to ambulate to the bathroom across the gutierrez with a rolling walker, right leg brace, and her mother supervising. She feels pain and spasms have been well controlled with current medication regimen. She still notes her right lower leg feels asleep.       PAST MEDICAL & SURGICAL HISTORY  Cafe-au-lait spots  Optic glioma  Nerve sheath tumor  Solitary kidney  Adrenal Cortex Neoplasm  Neurofibromatosis, Type 1  Neurofibroma  Central venous catheter in place  Other Adrenal Hypofunction  S/P Nephrectomy    ALLERGIES  albuterol (Rash)    SOCIAL HISTORY  Patient lives in an apartment with ramp/elevator access. Previously, she ambulated independently in her community, and to/from part time job at [employer].    FUNCTIONAL PROGRESS  PT 11/23/20: minimum assist with gait using rolling walker and RLE AFO at 25 feet with 1 person assist  11/21/20: moderate assist with bed mobility and sit-to-stand transfers, minimum assist with stand-to-sit transfer    OT 11/24/20: contact guard with toilet transfer training using rolling walker and 1 person assist, minimum assist with toilet training.    REVIEW OF SYSTEMS  Constitutional - No fever,  No fatigue  HEENT - No vertigo, No neck pain  Neurological - No headaches, No memory loss, +loss of strength in right leg, +numbness in right leg, No tremors  Skin - No rashes, No lesions   Musculoskeletal - No joint pain, No joint swelling, No muscle pain  Psychiatric - No depression, No anxiety    VITALS  T(C): 36.6 (11-24-20 @ 08:00), Max: 36.8 (11-23-20 @ 16:45)  HR: 47 (11-24-20 @ 08:00) (43 - 80)  BP: 105/61 (11-24-20 @ 08:00) (93/48 - 105/61)  RR: 15 (11-24-20 @ 08:00) (14 - 18)  SpO2: 98% (11-24-20 @ 08:00) (97% - 100%)  Wt(kg): --    MEDICATIONS   acetaminophen   Oral Liquid - Peds. 650 milliGRAM(s) every 6 hours PRN  cholecalciferol Oral Tab/Cap - Peds 1000 Unit(s) daily  dexAMETHasone  Oral Tab/Cap - Peds 2 milliGRAM(s) every 12 hours  dexAMETHasone  Oral Tab/Cap - Peds     diazepam  Oral Tab/Cap - Peds 5 milliGRAM(s) every 6 hours PRN  gabapentin Oral Tab/Cap - Peds 900 milliGRAM(s) at bedtime  lansoprazole  DR Oral Tab/Cap - Peds 30 milliGRAM(s) daily  polyethylene glycol 3350 Oral Powder - Peds 17 Gram(s) daily PRN  senna 8.6 milliGRAM(s) Oral Tablet - Peds 1 Tablet(s) daily PRN      RECENT LABS/IMAGING    MRI thoracic spine w/wo IV contrast 11/19/20:  Comparison is made with the prior MRI of 9/16/2020.    In the interval since the prior examination there has been a T9 through T 12 laminectomy with removal of the intramedullary mass previously identified. There is less expansion of the cord from T10 through T11 with low signal intensity within the central portion of the cord consistent with fluid and postoperative changes. There is no definite evidence of residual neoplasm. There is no significant hemorrhage. A small amount of normal expected postoperative changes are identified at the laminectomy site. After contrast administration there is normal osseous and vascular enhancement.      ---------  PHYSICAL EXAM  Constitutional - In no acute distress, Comfortable  Chest - Breathing comfortably, No wheezing  Abdomen - Soft   Extremities - No cyanosis or edema, No calf tenderness   Neurologic Exam -                    Cognitive - Awake, Alert, understands her situation, able to make her own decisions     Communication - Fluent, No dysarthria     Muscle strength -        R/L           Shoulder abduction 4+/4+           Elbow flexion           5/5           Elbow extension       5/5           Wrist extension        5/5           Finger abduction      5/5           Hip flexion               1/5           Knee extension         2/5           Ankle dorsiflexion     0/5           Ankle plantarflexion  0/5     Sensory - diminished sensation in RLE diffusely below the level of the patella.     Reflexes - diminished right patellar and Achilles tendon reflex, 2+ on the left         Balance - WNL Static     Gait (with rolling walker and right AFO) - slow step-through gait. No truncal or lower extremity instability noted. Able to transfer from sit-to-stand and stand-to-sit with supervision.   Psychiatric - Mood stable, Affect WNL

## 2020-11-24 NOTE — PROGRESS NOTE PEDS - NSHPATTENDINGPLANDISCUSS_GEN_ALL_CORE
PICU team, Mother and patient

## 2020-11-24 NOTE — PROGRESS NOTE PEDS - ASSESSMENT
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 OR course complicated by loss of potentials on right side, so MAP goal increased to maintain spinal perfusion. Post operative pain well controlled. Oncology team not yet involved as pathology pending. Right leg paresis and paresthesias persist--numbness improved.     Resp:  no acute issues     CV:   MAP liberalized per neurosurgery today- normal BP for age     FENGI:  po ad orion    Neuro:  Decadron now 3 mg every 8 hours  gabapentin (per home)  Acetaminophen, Valium, oxy PRN  MRI done 11/19  PT/OT actively involved--inpatient rehab being recommended and currently being explored-- aware and working on placement     ID  ancef completed

## 2020-11-24 NOTE — CONSULT NOTE PEDS - ASSESSMENT
ASSESSMENT  20 year-old female with past medical history significant for Neurofibromatosis Type 1, optic glioma, left solitary kidney and albuterol allergy presents with right lower extremity weakness after scheduled T9-12 laminectomy with intramedullar tumor resection 11/18/20.     Due to her acute neurological injury with some recovery of muscle strength and sensation, she is recommended for acute inpatient rehabilitation which would consist of 3 hours of therapy per day, 5 days a week, with daily PM&R physician follow up for medical management. She would be able to tolerate 3 hours of intensive physical and occupational therapy a day. Patient expressed that she did not want to miss her sister's wedding in 1 week and would rather attend outpatient therapy. Several options were explained to patient and mother:  1. Outpatient PT/OT - Outpatient PT/OT versus acute inpatient rehab was discussed at length with them. It was explained that there are many barriers to daily outpatient PT/OT and it is likely not possible.   2. Acute inpatient rehab - Her rehab needs would be best met at acute rehab due to intensive individualized therapy. Patient may be able to request a day pass from acute rehab in order to attend her sister's wedding.   3. Discharge home from hospital and attend acute rehab after the wedding - It was explained that this is a highly unusual choice that may be difficult to arrange. Arrangements and insurance authorization can be attempted.     PLAN  1. Recommend acute inpatient rehab. Patient is currently undecided but options were discussed at length with patient and mother.   2. Continue right AFO brace when ambulating.  3. Continue bedside PT/OT while hospitalized.    PM&R will continue to follow as needed.

## 2020-11-24 NOTE — PROGRESS NOTE PEDS - SUBJECTIVE AND OBJECTIVE BOX
NEUROSURGERY NOTE   JAYLIN PARADA / [id number] / 11-24-20 @ 07:27    PAST 24hr EVENTS:  19yo F with PMH significant for Neurofibromatosis Type 1, optic glioma, solitary kidney and albuterol allergy now POD #0 from T9-12 laminectomy with intramedullar tumor resection. Lost RLE potentials during procedure. Patient in and out of sedation on arrival to PICU.    No acute events overnight. RLE improving. Doing well, planning for D/C to acute will touch base with .     HPI: 20y Female    PHYSICAL EXAM:   Mental Status: Awake, Alert, Affect appropriate  PERRL, EOMI  Motor:  R hip 1/5  R KF 3/5, R KE 4/5, 0/5 plant/dors flexion   L side 5/5   Incision/Wound: c/d/i    Vital Signs Last 24 Hrs  T(C): 36.6 (24 Nov 2020 05:00), Max: 36.8 (23 Nov 2020 16:45)  T(F): 97.8 (24 Nov 2020 05:00), Max: 98.2 (23 Nov 2020 16:45)  HR: 43 (24 Nov 2020 05:00) (43 - 80)  BP: 103/57 (24 Nov 2020 05:00) (93/48 - 104/62)  BP(mean): 67 (24 Nov 2020 05:00) (58 - 74)  RR: 14 (24 Nov 2020 05:00) (14 - 18)  SpO2: 98% (24 Nov 2020 05:00) (97% - 100%)    I&O's Summary    23 Nov 2020 07:01  -  24 Nov 2020 07:00  --------------------------------------------------------  IN: 1200 mL / OUT: 1250 mL / NET: -50 mL    MEDICATIONS  (STANDING):  cholecalciferol Oral Tab/Cap - Peds 1000 Unit(s) Oral daily  dexAMETHasone  Oral Tab/Cap - Peds 2 milliGRAM(s) Oral every 12 hours  dexAMETHasone  Oral Tab/Cap - Peds   Oral   gabapentin Oral Tab/Cap - Peds 900 milliGRAM(s) Oral at bedtime  lansoprazole  DR Oral Tab/Cap - Peds 30 milliGRAM(s) Oral daily    MEDICATIONS  (PRN):  acetaminophen   Oral Liquid - Peds. 650 milliGRAM(s) Oral every 6 hours PRN Mild Pain (1 - 3), Moderate Pain (4 - 6)  diazepam  Oral Tab/Cap - Peds 5 milliGRAM(s) Oral every 6 hours PRN back pain  polyethylene glycol 3350 Oral Powder - Peds 17 Gram(s) Oral daily PRN Constipation  senna 8.6 milliGRAM(s) Oral Tablet - Peds 1 Tablet(s) Oral daily PRN Constipation      NPO STATUS:   REASON: [] OR procedure   [] imaging with sedation   [] medical need    [] other   RN Informed: [] Yes [] No  Family informed and educated [] Yes [] No    RADIOLOGY:

## 2020-11-24 NOTE — DISCHARGE NOTE NURSING/CASE MANAGEMENT/SOCIAL WORK - PATIENT PORTAL LINK FT
You can access the FollowMyHealth Patient Portal offered by [facility] by registering at the following website: [email or web address]. By joining [facility] [employer]’s FollowMyHealth portal, you will also be able to view your health information using other applications (apps) compatible with our system.

## 2020-11-24 NOTE — PROGRESS NOTE PEDS - SUBJECTIVE AND OBJECTIVE BOX
CC:     Interval/Overnight Events:      VITAL SIGNS:  T(C): 36.6 (11-24-20 @ 05:00), Max: 36.8 (11-23-20 @ 16:45)  HR: 43 (11-24-20 @ 05:00) (43 - 80)  BP: 103/57 (11-24-20 @ 05:00) (93/48 - 104/62)  ABP: --  ABP(mean): --  RR: 14 (11-24-20 @ 05:00) (14 - 18)  SpO2: 98% (11-24-20 @ 05:00) (97% - 100%)  CVP(mm Hg): --    ==============================RESPIRATORY========================  FiO2: 	    Mechanical Ventilation:       Respiratory Medications:        ============================CARDIOVASCULAR=======================  Cardiac Rhythm:	 NSR    Cardiovascular Medications:        =====================FLUIDS/ELECTROLYTES/NUTRITION===================  I&O's Summary    23 Nov 2020 07:01  -  24 Nov 2020 07:00  --------------------------------------------------------  IN: 1200 mL / OUT: 1250 mL / NET: -50 mL      Daily Weight: 46.6 (22 Nov 2020 11:43)          Diet:     Gastrointestinal Medications:  cholecalciferol Oral Tab/Cap - Peds 1000 Unit(s) Oral daily  lansoprazole  DR Oral Tab/Cap - Peds 30 milliGRAM(s) Oral daily  polyethylene glycol 3350 Oral Powder - Peds 17 Gram(s) Oral daily PRN  senna 8.6 milliGRAM(s) Oral Tablet - Peds 1 Tablet(s) Oral daily PRN      Fluid Management:  Fluid Status: [ ] Hypovolemic      [ ] Euvolemic         [ ] Fluid overloaded  Fluid Status Goal for next 24hr.:   [ ] Net Negative    ______   ml       [ ] Net Positive ____        ml      [ ] Intake=Output  [ ] No specific fluid goal  Fluid Intake Plan: ________________  Fluid Removal Plan: [ ] Not applicable  [ ] Diuretic Plan:  [ ] CRRT Plan:  [ ] Unchanged   [ ] No Fluid Removal     [ ] Prescribed weight loss of ___ml/hr.     [ ] Intake=Output       [ ] Fluid removal of ____    ml/hr.    ========================HEMATOLOGIC/ONCOLOGIC====================          Transfusions:	  Hematologic/Oncologic Medications:    DVT Prophylaxis:    ============================INFECTIOUS DISEASE========================  Antimicrobials/Immunologic Medications:            =============================NEUROLOGY============================  Adequacy of sedation and pain control has been assessed and adjusted    SBS:  		  OSCAR-1:	      Neurologic Medications:  acetaminophen   Oral Liquid - Peds. 650 milliGRAM(s) Oral every 6 hours PRN  diazepam  Oral Tab/Cap - Peds 5 milliGRAM(s) Oral every 6 hours PRN  gabapentin Oral Tab/Cap - Peds 900 milliGRAM(s) Oral at bedtime      OTHER MEDICATIONS:  Endocrine/Metabolic Medications:  dexAMETHasone  Oral Tab/Cap - Peds 2 milliGRAM(s) Oral every 12 hours  dexAMETHasone  Oral Tab/Cap - Peds   Oral     Genitourinary Medications:    Topical/Other Medications:      =======================PATIENT CARE ACCESS DEVICES===================  Peripheral IV  Central Venous Line	R	L	IJ	Fem	SC			Placed:   Arterial Line	R	L	PT	DP	Fem	Rad	Ax	Placed:   PICC:				  Broviac		  Mediport  Urinary Catheter, Date Placed:   Necessity of urinary, arterial, and venous catheters discussed    ============================PHYSICAL EXAM============================  General: 	In no acute distress  Respiratory:	Lungs clear to auscultation bilaterally. Good aeration. No rales,   .		rhonchi, retractions or wheezing. Effort even and unlabored.  CV:		Regular rate and rhythm. Normal S1/S2. No murmurs, rubs, or   .		gallop. Capillary refill < 2 seconds. Distal pulses 2+ and equal.  Abdomen:	Soft, non-distended. Bowel sounds present. No palpable   .		hepatosplenomegaly.  Skin:		No rash.  Extremities:	Warm and well perfused. No gross extremity deformities.  Neurologic:	Alert and oriented. No acute change from baseline exam.    ============================IMAGING STUDIES=========================        =============================SOCIAL=================================  Parent/Guardian is at the bedside  Patient and Parent/Guardian updated as to the progress/plan of care    The patient remains in critical and unstable condition, and requires ICU care and monitoring    The patient is improving but requires continued monitoring and adjustment of therapy    Total critical care time spent by attending physician was 35 minutes excluding procedure time. CC:     Interval/Overnight Events: None      VITAL SIGNS:  T(C): 36.6 (11-24-20 @ 05:00), Max: 36.8 (11-23-20 @ 16:45)  HR: 43 (11-24-20 @ 05:00) (43 - 80)  BP: 103/57 (11-24-20 @ 05:00) (93/48 - 104/62)  RR: 14 (11-24-20 @ 05:00) (14 - 18)  SpO2: 98% (11-24-20 @ 05:00) (97% - 100%)      ==============================RESPIRATORY========================  Room air        ============================CARDIOVASCULAR=======================  Cardiac Rhythm:	 Normal sinus rhythm    =====================FLUIDS/ELECTROLYTES/NUTRITION===================  I&O's Summary    23 Nov 2020 07:01  -  24 Nov 2020 07:00  --------------------------------------------------------  IN: 1200 mL / OUT: 1250 mL / NET: -50 mL      Daily Weight: 46.6 (22 Nov 2020 11:43)      Diet: Regular    Gastrointestinal Medications:  cholecalciferol Oral Tab/Cap - Peds 1000 Unit(s) Oral daily  lansoprazole  DR Oral Tab/Cap - Peds 30 milliGRAM(s) Oral daily  polyethylene glycol 3350 Oral Powder - Peds 17 Gram(s) Oral daily PRN  senna 8.6 milliGRAM(s) Oral Tablet - Peds 1 Tablet(s) Oral daily PRN      ========================HEMATOLOGIC/ONCOLOGIC====================  No active issues      Venodyne when in    ============================INFECTIOUS DISEASE========================  Antimicrobials/Immunologic Medications:            =============================NEUROLOGY============================  Adequacy of sedation and pain control has been assessed and adjusted        Neurologic Medications:  acetaminophen   Oral Liquid - Peds. 650 milliGRAM(s) Oral every 6 hours PRN  diazepam  Oral Tab/Cap - Peds 5 milliGRAM(s) Oral every 6 hours PRN  gabapentin Oral Tab/Cap - Peds 900 milliGRAM(s) Oral at bedtime  dexAMETHasone  Oral Tab/Cap - Peds 2 milliGRAM(s) Oral every 12 hours  dexAMETHasone  Oral Tab/Cap - Peds   Oral         =======================PATIENT CARE ACCESS DEVICES===================  Peripheral IV  Central Venous Line	R	L	IJ	Fem	SC			Placed:   Arterial Line	R	L	PT	DP	Fem	Rad	Ax	Placed:   PICC:				  Broviac		  Mediport  Urinary Catheter, Date Placed:   Necessity of urinary, arterial, and venous catheters discussed    ============================PHYSICAL EXAM============================  General: 	In no acute distress  Respiratory:	Lungs clear to auscultation bilaterally. Good aeration. No rales,   .		rhonchi, retractions or wheezing. Effort even and unlabored.  CV:		Regular rate and rhythm. Normal S1/S2. No murmurs, rubs, or   .		gallop. Capillary refill < 2 seconds. Distal pulses 2+ and equal.  Abdomen:	Soft, non-distended. Bowel sounds present. No palpable   .		hepatosplenomegaly.  Skin:		No rash.  Extremities:	Warm and well perfused. No gross extremity deformities.  Neurologic:	Alert and oriented. No acute change from baseline exam.    ============================IMAGING STUDIES=========================        =============================SOCIAL=================================  Parent/Guardian is at the bedside  Patient and Parent/Guardian updated as to the progress/plan of care    The patient remains in critical and unstable condition, and requires ICU care and monitoring    The patient is improving but requires continued monitoring and adjustment of therapy    Total critical care time spent by attending physician was 35 minutes excluding procedure time.

## 2020-11-24 NOTE — DISCHARGE NOTE NURSING/CASE MANAGEMENT/SOCIAL WORK - NSDCPNINST_GEN_ALL_CORE
Contact physician for any fever or redness, swelling, drainage from incision site. Continue medications as prescribed. Follow up with care as instructed.

## 2020-11-24 NOTE — PROGRESS NOTE PEDS - ASSESSMENT
19y/o  F,  PMhx neurofibramatosis type 1 s/p SOC 2014, optic glioma, solitary kidney s/p nephr/adrenelectomy, T10-11 lami rxn tumor 2/2020, now s/p T9-12 laminectomy with laminoplasty and resection of intramedullary spinal cord tumor, lost RLE motors during the case, immediately post op RLE 0/5, now moving right leg as above    PLAN   - D/C planning to rehab, will touch base with    - Will need new labs and covid prior to discharge  - Continue decadron taper   - PT/OT   - Can transfer to the floor   - Discussed case with attending

## 2021-01-08 ENCOUNTER — NON-APPOINTMENT (OUTPATIENT)
Age: 21
End: 2021-01-08

## 2021-02-10 ENCOUNTER — OUTPATIENT (OUTPATIENT)
Dept: OUTPATIENT SERVICES | Age: 21
LOS: 1 days | End: 2021-02-10

## 2021-02-10 ENCOUNTER — APPOINTMENT (OUTPATIENT)
Dept: MRI IMAGING | Facility: HOSPITAL | Age: 21
End: 2021-02-10
Payer: COMMERCIAL

## 2021-02-10 DIAGNOSIS — D49.7 NEOPLASM OF UNSPECIFIED BEHAVIOR OF ENDOCRINE GLANDS AND OTHER PARTS OF NERVOUS SYSTEM: ICD-10-CM

## 2021-02-10 DIAGNOSIS — Z78.9 OTHER SPECIFIED HEALTH STATUS: Chronic | ICD-10-CM

## 2021-02-10 DIAGNOSIS — D36.10 BENIGN NEOPLASM OF PERIPHERAL NERVES AND AUTONOMIC NERVOUS SYSTEM, UNSPECIFIED: Chronic | ICD-10-CM

## 2021-02-10 PROCEDURE — 72157 MRI CHEST SPINE W/O & W/DYE: CPT | Mod: 26

## 2021-02-10 PROCEDURE — 72158 MRI LUMBAR SPINE W/O & W/DYE: CPT | Mod: 26

## 2021-02-18 NOTE — ASU PATIENT PROFILE, PEDIATRIC - PATIENT REPRESENTATIVE: ( YOU CAN CHOOSE ANY PERSON THAT CAN ASSIST YOU WITH YOUR HEALTH CARE PREFERENCES, DOES NOT HAVE TO BE A SPOUSE, IMMEDIATE FAMILY OR SIGNIFICANT OTHER/PARTNER)
History  Chief Complaint   Patient presents with    Shoulder Pain     pt  was recently moving furniture and developed left shoulder pain, constant, throbbing, stabbing radiating into left elbow, numbness to fingers in left hand, treating with otc naproxen and tylenol with no relief, hx of shoulder spur,      43-year-old male presents emergency room with chief complaint right shoulder pain  Patient reports history of previous shoulder pain which required surgical intervention back in 2013  Review of the records reveals the patient had a history of shoulder impingement for which he underwent arthroscopic surgery  Patient reports no direct trauma to the area of a reports that over the last few days after moving furniture began to cause him pain  He reports that occasionally gets some numbness from his elbow to his fingers  This resolves fairly spontaneously  Again, he denies any blunt trauma  Denies other injury  Was seen in another emergency department 2 days ago and was given topical medications whic he reports is not providing him relief  Currently in the emergency room there seems to be minimal restricted range of motion however he is reporting pain with movement  History provided by:  Patient  Shoulder Pain  Location:  Shoulder  Shoulder location:  R shoulder  Injury: no    Pain details:     Quality:  Aching and sharp    Radiates to:  R upper arm    Severity:  Moderate    Onset quality:  Gradual    Timing:  Intermittent    Progression:  Waxing and waning  Handedness:  Right-handed  Dislocation: no    Foreign body present:  No foreign bodies  Prior injury to area:  Yes  Relieved by:  Nothing  Worsened by:   Movement  Ineffective treatments:  NSAIDs and arthritis medication  Associated symptoms: decreased range of motion and tingling    Associated symptoms: no back pain, no fatigue, no fever, no muscle weakness, no neck pain, no numbness, no stiffness and no swelling        None       Past Medical History:   Diagnosis Date    Bone spur        Past Surgical History:   Procedure Laterality Date    SHOULDER SURGERY         History reviewed  No pertinent family history  I have reviewed and agree with the history as documented  E-Cigarette/Vaping    E-Cigarette Use Never User      E-Cigarette/Vaping Substances    Nicotine No     THC No     CBD No     Flavoring No      Social History     Tobacco Use    Smoking status: Former Smoker    Smokeless tobacco: Never Used   Substance Use Topics    Alcohol use: Yes     Frequency: Monthly or less     Drinks per session: 1 or 2     Binge frequency: Never    Drug use: Never       Review of Systems   Constitutional: Negative  Negative for activity change, fatigue and fever  HENT: Negative  Respiratory: Negative  Cardiovascular: Negative  Gastrointestinal: Negative  Genitourinary: Negative  Musculoskeletal: Positive for arthralgias  Negative for back pain, neck pain and stiffness  All other systems reviewed and are negative  Physical Exam  Physical Exam  Vitals signs and nursing note reviewed  Constitutional:       General: He is not in acute distress  Appearance: He is normal weight  He is not ill-appearing or toxic-appearing  HENT:      Head: Normocephalic  Right Ear: External ear normal       Left Ear: External ear normal       Mouth/Throat:      Pharynx: Oropharynx is clear  Eyes:      Pupils: Pupils are equal, round, and reactive to light  Cardiovascular:      Rate and Rhythm: Normal rate  Pulmonary:      Effort: Pulmonary effort is normal    Abdominal:      General: Abdomen is flat  Musculoskeletal:      Right shoulder: He exhibits tenderness and bony tenderness  He exhibits normal range of motion  Comments: Tender at [facility] joint   Neurological:      General: No focal deficit present  Mental Status: He is alert  Psychiatric:         Mood and Affect: Mood normal          Thought Content:  Thought content normal          Judgment: Judgment normal          Vital Signs  ED Triage Vitals [02/17/21 1938]   Temp Pulse Respirations Blood Pressure SpO2   -- 68 20 140/83 98 %      Temp src Heart Rate Source Patient Position - Orthostatic VS BP Location FiO2 (%)   -- Monitor Sitting Right arm --      Pain Score       9           Vitals:    02/17/21 1938   BP: 140/83   Pulse: 68   Patient Position - Orthostatic VS: Sitting         Visual Acuity      ED Medications  Medications   ketorolac (TORADOL) injection 30 mg (30 mg Intramuscular Given 2/17/21 2031)       Diagnostic Studies  Results Reviewed     None                 XR shoulder 2+ vw right   ED Interpretation by Rachael Suarez DO (02/17 2028)   No acute abnormality       by Jensen Bearden (02/17 2022)                 Procedures  Procedures         ED Course  ED Course as of Feb 17 2037   Wed Feb 17, 2021 2028 No acute abnormality identified on patient's imaging  Patient was advised of same  Patient reports he has follow-up with orthopedics next week  No further intervention required at this time  MDM  Number of Diagnoses or Management Options  Acute pain of right shoulder: new and requires workup  Strain of right shoulder, initial encounter: new and requires workup  Diagnosis management comments: Patient was tender at the right [facility] joint he was offered an injection of Marcaine and steroid solution  However he declined secondary to his upcoming appoint with Orthopedics  Patient was given injection of Toradol    He he was also given a prescription for 10 tablets of narcotic pain relief after a drug database reviewed and found no evidence of concern       Amount and/or Complexity of Data Reviewed  Tests in the radiology section of CPT®: ordered and reviewed  Independent visualization of images, tracings, or specimens: yes    Risk of Complications, Morbidity, and/or Mortality  Presenting problems: moderate  Diagnostic procedures: moderate  Management options: moderate        Disposition  Final diagnoses:   Acute pain of right shoulder   Strain of right shoulder, initial encounter     Time reflects when diagnosis was documented in both MDM as applicable and the Disposition within this note     Time User Action Codes Description Comment    2/17/2021  8:00 PM Rigo Amberly Sauceda [[id number]] Acute pain of right shoulder     2/17/2021  8:37 PM Rigo Sauceda [H76 [id number]] Strain of right shoulder, initial encounter       ED Disposition     ED Disposition Condition Date/Time Comment    Discharge Stable Wed Feb 17, 2021  8:29 PM Julián Pop Ohme discharge to home/self care  Follow-up Information     Follow up With Specialties Details Why Contact Info    Your orthopedic surgeon   As scheduled           Patient's Medications   Discharge Prescriptions    OXYCODONE-ACETAMINOPHEN (PERCOCET) 5-325 MG PER TABLET    Take 1 tablet by mouth every 6 (six) hours as needed for severe pain for up to 10 daysMax Daily Amount: 4 tablets       Start Date: 2/17/2021 End Date: 2/27/2021       Order Dose: 1 tablet       Quantity: 10 tablet    Refills: 0     No discharge procedures on file      PDMP Review       Value Time User    PDMP Reviewed  Yes 2/17/2021  7:48 PM Paulo Chavarria DO          ED Provider  Electronically Signed by           Paulo Chavarria DO  02/17/21 2037 Yes

## 2021-03-03 ENCOUNTER — OUTPATIENT (OUTPATIENT)
Dept: OUTPATIENT SERVICES | Age: 21
LOS: 1 days | Discharge: HOME | End: 2021-03-03

## 2021-03-03 DIAGNOSIS — Z78.9 OTHER SPECIFIED HEALTH STATUS: Chronic | ICD-10-CM

## 2021-03-03 DIAGNOSIS — D36.10 BENIGN NEOPLASM OF PERIPHERAL NERVES AND AUTONOMIC NERVOUS SYSTEM, UNSPECIFIED: Chronic | ICD-10-CM

## 2021-03-05 ENCOUNTER — APPOINTMENT (OUTPATIENT)
Dept: PEDIATRIC HEMATOLOGY/ONCOLOGY | Facility: CLINIC | Age: 21
End: 2021-03-05
Payer: COMMERCIAL

## 2021-03-05 VITALS
DIASTOLIC BLOOD PRESSURE: 64 MMHG | WEIGHT: 102.74 LBS | HEIGHT: 62.32 IN | SYSTOLIC BLOOD PRESSURE: 101 MMHG | RESPIRATION RATE: 18 BRPM | BODY MASS INDEX: 18.67 KG/M2 | HEART RATE: 76 BPM | TEMPERATURE: 98.06 F

## 2021-03-05 PROCEDURE — 99072 ADDL SUPL MATRL&STAF TM PHE: CPT

## 2021-03-05 PROCEDURE — 99215 OFFICE O/P EST HI 40 MIN: CPT

## 2021-03-08 DIAGNOSIS — Q85.00 NEUROFIBROMATOSIS, UNSPECIFIED: ICD-10-CM

## 2021-03-08 LAB
25(OH)D3 SERPL-MCNC: 29.9 NG/ML
ALBUMIN SERPL ELPH-MCNC: 4.5 G/DL
ALP BLD-CCNC: 115 U/L
ALT SERPL-CCNC: 9 U/L
ANION GAP SERPL CALC-SCNC: 11 MMOL/L
AST SERPL-CCNC: 14 U/L
BASOPHILS # BLD AUTO: 0.03 K/UL
BASOPHILS NFR BLD AUTO: 0.5 %
BILIRUB SERPL-MCNC: 0.7 MG/DL
BUN SERPL-MCNC: 18 MG/DL
CALCIUM SERPL-MCNC: 9.5 MG/DL
CHLORIDE SERPL-SCNC: 106 MMOL/L
CK SERPL-CCNC: 29 U/L
CO2 SERPL-SCNC: 23 MMOL/L
CREAT SERPL-MCNC: 1.01 MG/DL
EOSINOPHIL # BLD AUTO: 0.01 K/UL
EOSINOPHIL NFR BLD AUTO: 0.2 %
GLUCOSE SERPL-MCNC: 82 MG/DL
HCT VFR BLD CALC: 40.7 %
HGB BLD-MCNC: 13.3 G/DL
IMM GRANULOCYTES NFR BLD AUTO: 0.2 %
LYMPHOCYTES # BLD AUTO: 1.4 K/UL
LYMPHOCYTES NFR BLD AUTO: 22.7 %
MAGNESIUM SERPL-MCNC: 2.3 MG/DL
MAN DIFF?: NORMAL
MCHC RBC-ENTMCNC: 32.3 PG
MCHC RBC-ENTMCNC: 32.7 GM/DL
MCV RBC AUTO: 98.8 FL
MONOCYTES # BLD AUTO: 0.57 K/UL
MONOCYTES NFR BLD AUTO: 9.2 %
NEUTROPHILS # BLD AUTO: 4.15 K/UL
NEUTROPHILS NFR BLD AUTO: 67.2 %
PHOSPHATE SERPL-MCNC: 2.8 MG/DL
PLATELET # BLD AUTO: 253 K/UL
POTASSIUM SERPL-SCNC: 4.3 MMOL/L
PROT SERPL-MCNC: 6.9 G/DL
RBC # BLD: 4.12 M/UL
RBC # FLD: 12.2 %
SODIUM SERPL-SCNC: 140 MMOL/L
WBC # FLD AUTO: 6.17 K/UL

## 2021-03-08 RX ORDER — GABAPENTIN 300 MG/1
300 CAPSULE ORAL
Refills: 0 | Status: DISCONTINUED | COMMUNITY
End: 2021-03-08

## 2021-03-10 ENCOUNTER — APPOINTMENT (OUTPATIENT)
Dept: OPHTHALMOLOGY | Facility: CLINIC | Age: 21
End: 2021-03-10

## 2021-03-15 NOTE — CONSULT LETTER
[Courtesy Letter:] : I had the pleasure of seeing your patient, [unfilled], in my office today. [Please see my note below.] : Please see my note below. [Consult Closing:] : Thank you very much for allowing me to participate in the care of this patient.  If you have any questions, please do not hesitate to contact me. [Sincerely,] : Sincerely, [Dear  ___] : Dear  [unfilled], [DrElaine  ___] : Dr. MCCALLUM [FreeTextEntry2] : Juancarlos Bo MD\par [address] [place] [address], [address]\[place] [address]\par Tel. #: [phone]\par Fax #: [phone] [FreeTextEntry3] : Wan Cerda MD \par Chief, [facility]\par  of Pediatrics\par [employer] [facility] [employer] [facility]\par

## 2021-03-15 NOTE — RESULTS/DATA
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 Preoperative MRI dated 11/9/2018 demonstrated expansile intramedullary thoracic cord tumor extending from T9-T10 through T11-T12, with hyperintense T2 signal characteristics and at best minimal contrast enhancement.\par \par Technique\par Multiplanar, multisequence magnetic resonance imaging of the thoracic and lumbar spine regions was performed with and without contrast (Gadavist; 5 mL administered; 5 mL discarded)..\par \par FINDINGS:\par THORACIC SPINE:\par SURGICAL CHANGES: Resolving paraspinal surgical sequelae of T9 through T12 laminectomies. Persistent dorsal epidural fluid collection from the superior aspect of T9 extending caudally through T12-L1 is again seen to narrow the thecal sac from T9-T10 through T12-L1, grossly unchanged. This epidural fluid collection is again noted to produce mild cord deformity from T9-T10 through T11-T12, predominantly flattening the dorsal thoracic cord. When comparing current prior axial T2-weighted images of the region, the epidural and paraspinal fluid collection has notably diminished in extent, with the degree of cord deformity grossly unchanged. Nonenhancing hyperintense T2 signal intensity region extending from T9 through T10-T11 is unchanged since the preoperative examination, and likely represents scar tissue from resection of previously reported tumor at this level.\par \par Thoracic cord: Abnormal hyperintense T2 signal intensity is again noted within the central cord extending from T9-T10 through T11-T12, grossly unchanged in cephalocaudal dimension. There is the suggestion of slightly greater expansion of the cord at the T11 level compared to prior (current 11:12 versus prior 10:10), perhaps increased by 1 mm based on axial images versus differences in technique. Two punctate foci of enhancement (without convincing precontrast T1 shortening) within the ventral cervical cavity at the inferior T10 and T10-T10 levels (21:11-12), not convincingly demonstrated on prior.\par \par A subtle stable minimally expansile nonenhancing focus of T2 hyperintensity is again noted within the thoracic cord at the upper T7 level (11:11) measuring approximately 10 mm in cephalocaudal dimension by approximately 5 mm in caliber.\par \par Paraspinal soft tissues: As above evolving/resolving postsurgical changes from T9-T10 through T11-T12; small left T9 paravertebral neurofibroma, measuring approximately 7 x 6 mm in the axial plane by 11 mm cephalocaudal, minimally larger in transverse dimensions; small stable 13 mm x 8 mm left T10 paraspinal neurofibroma; small stable T10-T11 through T11-T12 para-aortic (abutting the dorsal lateral aorta) neurofibroma measuring 40 mm cephalocaudal by 15 mm AP by 12 mm transverse oblique; otherwise unremarkable.\par \par Alignment: Mild kyphotic angulation at T10-T11 currently noted, not present on prior, possibly related to prior laminectomy. The usual thoracic kyphosis is otherwise maintained.\par Marrow: No focal or diffuse abnormal thoracic marrow signal intensity.\par Vertebral bodies: Redemonstrated T9 through T12 laminectomies as noted above; otherwise unremarkable in height.\par Neural foramina: No significant neural foraminal narrowing.?\par Intervertebral thoracic disc levels: Unremarkable in height and signal intensity\par \par LUMBAR SPINE:\par Alignment: The usual lumbar lordosis is maintained. No spondylolisthesis.\par Vertebrae: Vertebral body heights and margins are maintained.\par Intervertebral discs: Mild disc desiccation involving the L5-S1 intervertebral disc, unchanged; otherwise unremarkable lumbar disc signal intensity. No loss of intervertebral disc height.\par \par Spinal Canal: The conus medullaris terminates at the upper L2 level. The cauda equina are normally distributed within the thecal sac, and show no evidence of thickening or nodularity. The visualize distal thoracic cord is unremarkable in morphology and signal intensity. Stable dural ectasia of the lumbosacral region.\par \par Paraspinal soft tissues: An enhancing left paraspinal soft tissue mass is again noted at the L2-L3 level, deep to the left psoas muscle, measuring approximately 17 mm x 12 mm, not notably involving the left neural foramen at this level, probably arising from the dorsal root ganglia. This lesion is incompletely visualized on the prior examination but has increased in size since the second prior examination, when it measured 11 x 9 mm.\par \par Neural tumor involving the left greater than the right dorsal root ganglia at the L3-4 level neural tumor minimally increased in size. Also is noted to involve the exiting left S1 nerve root sheath and neural foramen, measuring up to 1.5 cm in size, minimally increased in size from second prior measuring 1.3 cm.\par \par Retroperitoneal soft tissues: Grossly stable large mildly enhancing left-sided retroperitoneal mass, spanning L2-L4, measuring approximately 5.1 x 4.8 x 8.5 cm, abutting the left psoas muscle and left aorta and anterior inferior margin of the left kidney. Additional small retroperitoneal, paraspinal and chest wall soft tissue neoplasms, grossly stable in appearance in the interval.\par \par Impression\par \par THORACIC SPINE WITH AND WITHOUT CONTRAST:\par 1. Evolving paraspinal postlaminectomy changes at the T9-T12 level, with diminished but persistent paraspinal fluid.\par 2. Stable postsurgical epidural fluid collection again seen deforming the dorsal thecal sac and to a lesser extent the dorsal thoracic cord at these levels, grossly unchanged.\par 3. Grossly stable abnormal T2 hyperintense and T1 hypointense signal within the postoperative cord from T9-T10 through T11-T12, most consistent with fluid signal intensity, grossly unchanged.\par 4. Suggestion of minimally increased caliber of the postoperative cord at the T11 level versus differences in technique.\par 5. Two punctate foci of enhancement within the ventral cervical cavity at the inferior T10 and T10-T10 levels, not convincingly demonstrated on prior. Continued close contrast-enhanced MR surveillance of this region is recommended.\par 6. Subtle stable minimally expansile nonenhancing focus of T2 hyperintensity again noted within the thoracic cord at the upper T7 level, grossly unchanged.\par 7. Small grossly stable enhancing left T9, T10 and T11 paravertebral schwannomas/neurofibromas, as detailed above.\par \par MRI LUMBAR SPINE WITH AND WITHOUT CONTRAST:\par 1. Redemonstrated left L2-L3 paraspinal nerve sheath tumor on the left at L2-3, partially visualized on prior but slightly increased from prior examination.\par 2. Minimally increased left greater than right neural tumors at L3-L4.\par 3. Mild interval increase in size in the left S1 nerve root sheath tumor.\par 4. Additional retroperitoneal, paravertebral and chest wall soft tissue neoplasm, favoring schwannomas, as detailed above.\par \par \par \par KISHA LÓPEZ MD, Resident Radiology\par This document has been electronically signed.\par MILTON MCKEON MD; Attending Radiologist\par This document has been electronically signed. Feb 12 2021 12:37PM\par \par

## 2021-03-15 NOTE — HISTORY OF PRESENT ILLNESS
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  6 years ago she underwent right adrenalectomy and nephrectomy.  Her last visit [facility] [place] a little over one year ago.  She follows with opthalmology, nephrology and cardiology regularly.\par \par Cate's  Right lower back pain resolved after resection of intradural tumor last year by Dr Griffith and Kendy in February 2020, was reresected in November for persistent asymptomatic growth.  She has been stable since, some right LE weakness, walks with cane and AFO.\par No side effects from Neurontin [de-identified] : \par She has no other complaint.

## 2021-03-15 NOTE — PHYSICAL EXAM
[PERRLA] : SUKHDEEP [EOMI] : EOMI  [Motor Exam nomal] : motor exam normal [No Dysmetria] : no dysmetria  [Normal] : affect appropriate [80: Normal activity with effort; some signs or symptoms of disease.] : 80: Normal activity with effort; some signs or symptoms of disease.  [de-identified] : Large healed post surgical scar on right abdomen  [de-identified] : tenderness along right lower back [de-identified] : right foot in AFO [de-identified] : many cafe au lait spots scattered diffusely

## 2021-03-19 ENCOUNTER — NON-APPOINTMENT (OUTPATIENT)
Age: 21
End: 2021-03-19

## 2021-03-19 ENCOUNTER — APPOINTMENT (OUTPATIENT)
Dept: OPHTHALMOLOGY | Facility: CLINIC | Age: 21
End: 2021-03-19
Payer: COMMERCIAL

## 2021-03-19 PROCEDURE — 92134 CPTRZ OPH DX IMG PST SGM RTA: CPT

## 2021-03-19 PROCEDURE — 99072 ADDL SUPL MATRL&STAF TM PHE: CPT

## 2021-03-19 PROCEDURE — 92004 COMPRE OPH EXAM NEW PT 1/>: CPT

## 2021-03-24 ENCOUNTER — APPOINTMENT (OUTPATIENT)
Dept: CV DIAGNOSITCS | Facility: HOSPITAL | Age: 21
End: 2021-03-24

## 2021-03-24 ENCOUNTER — OUTPATIENT (OUTPATIENT)
Dept: OUTPATIENT SERVICES | Facility: HOSPITAL | Age: 21
LOS: 1 days | End: 2021-03-24
Payer: COMMERCIAL

## 2021-03-24 DIAGNOSIS — Q85.01 NEUROFIBROMATOSIS, TYPE 1: ICD-10-CM

## 2021-03-24 DIAGNOSIS — Z78.9 OTHER SPECIFIED HEALTH STATUS: Chronic | ICD-10-CM

## 2021-03-24 DIAGNOSIS — D36.10 BENIGN NEOPLASM OF PERIPHERAL NERVES AND AUTONOMIC NERVOUS SYSTEM, UNSPECIFIED: Chronic | ICD-10-CM

## 2021-03-24 PROCEDURE — 93306 TTE W/DOPPLER COMPLETE: CPT | Mod: 26

## 2021-03-24 PROCEDURE — 93356 MYOCRD STRAIN IMG SPCKL TRCK: CPT

## 2021-03-24 PROCEDURE — 93306 TTE W/DOPPLER COMPLETE: CPT

## 2021-03-25 ENCOUNTER — APPOINTMENT (OUTPATIENT)
Dept: CARDIOLOGY | Facility: CLINIC | Age: 21
End: 2021-03-25
Payer: COMMERCIAL

## 2021-03-25 ENCOUNTER — NON-APPOINTMENT (OUTPATIENT)
Age: 21
End: 2021-03-25

## 2021-03-25 VITALS
BODY MASS INDEX: 18.77 KG/M2 | WEIGHT: 102 LBS | OXYGEN SATURATION: 100 % | DIASTOLIC BLOOD PRESSURE: 62 MMHG | SYSTOLIC BLOOD PRESSURE: 97 MMHG | HEART RATE: 77 BPM | HEIGHT: 62 IN

## 2021-03-25 DIAGNOSIS — Z91.89 OTHER SPECIFIED PERSONAL RISK FACTORS, NOT ELSEWHERE CLASSIFIED: ICD-10-CM

## 2021-03-25 DIAGNOSIS — I34.1 NONRHEUMATIC MITRAL (VALVE) PROLAPSE: ICD-10-CM

## 2021-03-25 DIAGNOSIS — Z92.21 PERSONAL HISTORY OF ANTINEOPLASTIC CHEMOTHERAPY: ICD-10-CM

## 2021-03-25 PROCEDURE — 99214 OFFICE O/P EST MOD 30 MIN: CPT

## 2021-03-25 PROCEDURE — 99072 ADDL SUPL MATRL&STAF TM PHE: CPT

## 2021-03-25 PROCEDURE — 93000 ELECTROCARDIOGRAM COMPLETE: CPT

## 2021-03-25 NOTE — HISTORY OF PRESENT ILLNESS
[FreeTextEntry1] : Cate is here with her mother today. She had two surgeries since her last visit and cannot have another one. She is being considered for a trial medication and required clearance with ECHO. No chest pain, palpitations or shortness of breath.

## 2021-03-25 NOTE — DISCUSSION/SUMMARY
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 She is asymptomatic. 3/24/2021 ECHO with normal LV and RV function. No significant valve disease. There are no cardiac contraindications to proceeding with enrollment.

## 2021-03-25 NOTE — PHYSICAL EXAM
[General Appearance - Well Developed] : well developed [Normal Appearance] : normal appearance [Well Groomed] : well groomed [General Appearance - Well Nourished] : well nourished [No Deformities] : no deformities [General Appearance - In No Acute Distress] : no acute distress [Normal Conjunctiva] : the conjunctiva exhibited no abnormalities [Eyelids - No Xanthelasma] : the eyelids demonstrated no xanthelasmas [Normal Oral Mucosa] : normal oral mucosa [No Oral Pallor] : no oral pallor [No Oral Cyanosis] : no oral cyanosis [Normal Jugular Venous A Waves Present] : normal jugular venous A waves present [Normal Jugular Venous V Waves Present] : normal jugular venous V waves present [No Jugular Venous Adams A Waves] : no jugular venous adams A waves [Respiration, Rhythm And Depth] : normal respiratory rhythm and effort [Exaggerated Use Of Accessory Muscles For Inspiration] : no accessory muscle use [Auscultation Breath Sounds / Voice Sounds] : lungs were clear to auscultation bilaterally [Heart Rate And Rhythm] : heart rate and rhythm were normal [Heart Sounds] : normal S1 and S2 [Murmurs] : no murmurs present [Abdomen Soft] : soft [Abdomen Tenderness] : non-tender [Abdomen Mass (___ Cm)] : no abdominal mass palpated [Abnormal Walk] : normal gait [Gait - Sufficient For Exercise Testing] : the gait was sufficient for exercise testing [Nail Clubbing] : no clubbing of the fingernails [Cyanosis, Localized] : no localized cyanosis [Petechial Hemorrhages (___cm)] : no petechial hemorrhages [Skin Color & Pigmentation] : normal skin color and pigmentation [] : no rash [No Venous Stasis] : no venous stasis [Skin Lesions] : no skin lesions [No Skin Ulcers] : no skin ulcer [No Xanthoma] : no  xanthoma was observed [Oriented To Time, Place, And Person] : oriented to person, place, and time [Affect] : the affect was normal [Mood] : the mood was normal [No Anxiety] : not feeling anxious

## 2021-04-12 ENCOUNTER — OUTPATIENT (OUTPATIENT)
Dept: OUTPATIENT SERVICES | Age: 21
LOS: 1 days | Discharge: HOME | End: 2021-04-12

## 2021-04-12 DIAGNOSIS — D36.10 BENIGN NEOPLASM OF PERIPHERAL NERVES AND AUTONOMIC NERVOUS SYSTEM, UNSPECIFIED: Chronic | ICD-10-CM

## 2021-04-12 DIAGNOSIS — Z78.9 OTHER SPECIFIED HEALTH STATUS: Chronic | ICD-10-CM

## 2021-04-13 ENCOUNTER — APPOINTMENT (OUTPATIENT)
Dept: PEDIATRIC HEMATOLOGY/ONCOLOGY | Facility: CLINIC | Age: 21
End: 2021-04-13
Payer: COMMERCIAL

## 2021-04-13 ENCOUNTER — RESULT REVIEW (OUTPATIENT)
Age: 21
End: 2021-04-13

## 2021-04-13 VITALS
HEART RATE: 76 BPM | TEMPERATURE: 98.06 F | DIASTOLIC BLOOD PRESSURE: 64 MMHG | SYSTOLIC BLOOD PRESSURE: 102 MMHG | WEIGHT: 102.74 LBS | RESPIRATION RATE: 20 BRPM | BODY MASS INDEX: 18.67 KG/M2 | HEIGHT: 62.4 IN

## 2021-04-13 DIAGNOSIS — I49.3 VENTRICULAR PREMATURE DEPOLARIZATION: ICD-10-CM

## 2021-04-13 DIAGNOSIS — M54.6 PAIN IN THORACIC SPINE: ICD-10-CM

## 2021-04-13 DIAGNOSIS — Q85.00 NEUROFIBROMATOSIS, UNSPECIFIED: ICD-10-CM

## 2021-04-13 DIAGNOSIS — M54.9 DORSALGIA, UNSPECIFIED: ICD-10-CM

## 2021-04-13 DIAGNOSIS — Z51.11 ENCOUNTER FOR ANTINEOPLASTIC CHEMOTHERAPY: ICD-10-CM

## 2021-04-13 DIAGNOSIS — G89.29 DORSALGIA, UNSPECIFIED: ICD-10-CM

## 2021-04-13 LAB
ALBUMIN SERPL ELPH-MCNC: 4.4 G/DL — SIGNIFICANT CHANGE UP (ref 3.3–5)
ALP SERPL-CCNC: 110 U/L — SIGNIFICANT CHANGE UP (ref 40–120)
ALT FLD-CCNC: 9 U/L — SIGNIFICANT CHANGE UP (ref 4–33)
ANION GAP SERPL CALC-SCNC: 9 MMOL/L — SIGNIFICANT CHANGE UP (ref 7–14)
AST SERPL-CCNC: 15 U/L — SIGNIFICANT CHANGE UP (ref 4–32)
BASOPHILS # BLD AUTO: 0.03 K/UL — SIGNIFICANT CHANGE UP (ref 0–0.2)
BASOPHILS NFR BLD AUTO: 0.6 % — SIGNIFICANT CHANGE UP (ref 0–2)
BILIRUB SERPL-MCNC: 0.6 MG/DL — SIGNIFICANT CHANGE UP (ref 0.2–1.2)
BUN SERPL-MCNC: 18 MG/DL — SIGNIFICANT CHANGE UP (ref 7–23)
CALCIUM SERPL-MCNC: 9.2 MG/DL — SIGNIFICANT CHANGE UP (ref 8.4–10.5)
CHLORIDE SERPL-SCNC: 106 MMOL/L — SIGNIFICANT CHANGE UP (ref 98–107)
CK SERPL-CCNC: 30 U/L — SIGNIFICANT CHANGE UP (ref 25–170)
CO2 SERPL-SCNC: 24 MMOL/L — SIGNIFICANT CHANGE UP (ref 22–31)
CREAT SERPL-MCNC: 0.91 MG/DL — SIGNIFICANT CHANGE UP (ref 0.5–1.3)
EOSINOPHIL # BLD AUTO: 0 K/UL — SIGNIFICANT CHANGE UP (ref 0–0.5)
EOSINOPHIL NFR BLD AUTO: 0 % — SIGNIFICANT CHANGE UP (ref 0–6)
GLUCOSE SERPL-MCNC: 86 MG/DL — SIGNIFICANT CHANGE UP (ref 70–99)
HCT VFR BLD CALC: 38.5 % — SIGNIFICANT CHANGE UP (ref 34.5–45)
HGB BLD-MCNC: 12.8 G/DL — SIGNIFICANT CHANGE UP (ref 11.5–15.5)
IANC: 3.17 K/UL — SIGNIFICANT CHANGE UP (ref 1.5–8.5)
IMM GRANULOCYTES NFR BLD AUTO: 0.6 % — SIGNIFICANT CHANGE UP (ref 0–1.5)
LYMPHOCYTES # BLD AUTO: 1.1 K/UL — SIGNIFICANT CHANGE UP (ref 1–3.3)
LYMPHOCYTES # BLD AUTO: 23.5 % — SIGNIFICANT CHANGE UP (ref 13–44)
MCHC RBC-ENTMCNC: 32.2 PG — SIGNIFICANT CHANGE UP (ref 27–34)
MCHC RBC-ENTMCNC: 33.2 GM/DL — SIGNIFICANT CHANGE UP (ref 32–36)
MCV RBC AUTO: 97 FL — SIGNIFICANT CHANGE UP (ref 80–100)
MONOCYTES # BLD AUTO: 0.36 K/UL — SIGNIFICANT CHANGE UP (ref 0–0.9)
MONOCYTES NFR BLD AUTO: 7.7 % — SIGNIFICANT CHANGE UP (ref 2–14)
NEUTROPHILS # BLD AUTO: 3.17 K/UL — SIGNIFICANT CHANGE UP (ref 1.8–7.4)
NEUTROPHILS NFR BLD AUTO: 67.6 % — SIGNIFICANT CHANGE UP (ref 43–77)
NRBC # BLD: 0 /100 WBCS — SIGNIFICANT CHANGE UP
NRBC # FLD: 0 K/UL — SIGNIFICANT CHANGE UP
PLATELET # BLD AUTO: 257 K/UL — SIGNIFICANT CHANGE UP (ref 150–400)
POTASSIUM SERPL-MCNC: 4 MMOL/L — SIGNIFICANT CHANGE UP (ref 3.5–5.3)
POTASSIUM SERPL-SCNC: 4 MMOL/L — SIGNIFICANT CHANGE UP (ref 3.5–5.3)
PROT SERPL-MCNC: 6.6 G/DL — SIGNIFICANT CHANGE UP (ref 6–8.3)
RBC # BLD: 3.97 M/UL — SIGNIFICANT CHANGE UP (ref 3.8–5.2)
RBC # FLD: 12.3 % — SIGNIFICANT CHANGE UP (ref 10.3–14.5)
SODIUM SERPL-SCNC: 139 MMOL/L — SIGNIFICANT CHANGE UP (ref 135–145)
WBC # BLD: 4.69 K/UL — SIGNIFICANT CHANGE UP (ref 3.8–10.5)
WBC # FLD AUTO: 4.69 K/UL — SIGNIFICANT CHANGE UP (ref 3.8–10.5)

## 2021-04-13 PROCEDURE — 99214 OFFICE O/P EST MOD 30 MIN: CPT

## 2021-04-13 PROCEDURE — 99072 ADDL SUPL MATRL&STAF TM PHE: CPT

## 2021-04-14 PROBLEM — M54.9 CHRONIC LEFT-SIDED BACK PAIN, UNSPECIFIED BACK LOCATION: Status: RESOLVED | Noted: 2019-08-27 | Resolved: 2021-04-14

## 2021-04-14 PROBLEM — M54.6 BACK PAIN, THORACIC: Status: RESOLVED | Noted: 2020-01-16 | Resolved: 2021-04-14

## 2021-04-14 PROBLEM — Q85.00 NEUROFIBROMATOSIS: Noted: 2020-01-15

## 2021-04-14 NOTE — HISTORY OF PRESENT ILLNESS
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  6 years ago she underwent right adrenalectomy and nephrectomy.  Her last visit at [facility] was a little over one year ago.  She follows with opthalmology, nephrology and cardiology regularly.\par \par Cate's  Right lower back pain resolved after resection of intradural tumor last year by Dr Griffith and Kendy in February 2020, was reresected in November for persistent asymptomatic growth.  She has been stable since, some right LE weakness, walks with cane and AFO.\par No side effects from Neurontin [de-identified] : Here to start Selumetinib.  Completed ophthalmology and cardiology pre-screens. She has no other complaint. \par No pain, walking now without a cane, using brace alone.

## 2021-04-14 NOTE — PHYSICAL EXAM
Take anti-inflammatory medications as well as Tylenol for the soreness  If symptoms persist please follow-up U primary care provider.      *-*-*-*-*-*-*-  [facility]    Monday - Thursday 8 a.m. - 8 p.m.  Friday                        8 a.m. - 8 p.m.    Saturday (and holidays) 8 a.m. - 4 p.m.  Sunday                                     Closed  *-*-*-*-*-*-*-  [facility] We Have New Hours on Fridays, we are now open til 8pm    [email or web address]  See current wait times for [facility] in real-time!  Reserve your waiting-room spot in line!   Receive text/email messages if our wait times are long,  so that you can do your waiting at home or work, instead of in our waiting room.     Note: This system does not guarantee an \"appointment time\" to see a provider. Also, patients may be called out of the waiting room \"ahead of turn\" in emergency situations, at discretion of Urgent Care staff.  ----------------  Thank you for choosing [facility] today. We hope you had a pleasant experience and we look forward to serving your future needs.    · If you have any questions about your VISIT, please call [phone].    · If you have any questions about your BILL, please call [phone].    · If you need a copy of your MEDICAL RECORD, please call [phone].    If you receive a survey in the mail about today's services, we hope that you will take a few minutes to let us know about your experience.  --------------------------------------------------------------------------------------------------------------  UNLESS OTHERWISE INSTRUCTED BY YOUR URGENT CARE PROVIDER TODAY, all follow-up for your medical issues should be managed by your primary care provider. The Urgent Care does not manage chronic medical issues or refill medications. You are responsible for scheduling and keeping any necessary follow-up visits with your primary care provider after this visit today.    --------------------------------------------------------------------------------------------------------------  IF YOU WERE PRESCRIBED AN ANTIBIOTIC TODAY: We recommend taking an over-the-counter probiotic (Such as Florajen 3 for adults, or [id number] for children -- AVAILABLE IN THE [facility] and other local pharmacies too) once a day for the entire duration of your antibiotics, and continuing it for 2 weeks after the antibiotics are finished. This will help reduce your chance of developing antibiotic-related diarrhea and/or yeast infections.  --------------------------------------------------------------------------------------------------------------       [EOMI] : EOMI  [PERRLA] : SUKHDEEP [Motor Exam nomal] : motor exam normal [No Dysmetria] : no dysmetria  [Normal] : affect appropriate [80: Normal activity with effort; some signs or symptoms of disease.] : 80: Normal activity with effort; some signs or symptoms of disease.  [de-identified] : Large healed post surgical scar on right abdomen  [de-identified] : tenderness along right lower back [de-identified] : right foot in AFO [de-identified] : many cafe au lait spots scattered diffusely

## 2021-04-14 NOTE — REASON FOR VISIT
[Follow-Up Visit] : a follow-up visit for [Patient] : patient [Mother] : mother [FreeTextEntry2] : NF1, plexiform neurofibroma

## 2021-04-16 DIAGNOSIS — D36.10 BENIGN NEOPLASM OF PERIPHERAL NERVES AND AUTONOMIC NERVOUS SYSTEM, UNSPECIFIED: ICD-10-CM

## 2021-04-23 DIAGNOSIS — L27.0 GENERALIZED SKIN ERUPTION DUE TO DRUGS AND MEDICAMENTS TAKEN INTERNALLY: ICD-10-CM

## 2021-04-26 ENCOUNTER — APPOINTMENT (OUTPATIENT)
Dept: OPHTHALMOLOGY | Facility: CLINIC | Age: 21
End: 2021-04-26
Payer: COMMERCIAL

## 2021-04-26 ENCOUNTER — NON-APPOINTMENT (OUTPATIENT)
Age: 21
End: 2021-04-26

## 2021-04-26 PROCEDURE — 92012 INTRM OPH EXAM EST PATIENT: CPT

## 2021-04-26 PROCEDURE — 92134 CPTRZ OPH DX IMG PST SGM RTA: CPT

## 2021-04-26 PROCEDURE — 99072 ADDL SUPL MATRL&STAF TM PHE: CPT

## 2021-04-30 ENCOUNTER — RESULT REVIEW (OUTPATIENT)
Age: 21
End: 2021-04-30

## 2021-05-05 NOTE — TRANSFER ACCEPTANCE NOTE - EYES
My note to Dr. Alejandra: \"Isidro Taylor MD FACC  Adjunct Clinical Professor of Medicine, [employer] [facility]  Past President, [place] Chapter, [employer]   detailed exam EOMI/conjunctiva clear

## 2021-05-07 ENCOUNTER — OUTPATIENT (OUTPATIENT)
Dept: OUTPATIENT SERVICES | Age: 21
LOS: 1 days | Discharge: HOME | End: 2021-05-07

## 2021-05-07 DIAGNOSIS — Z78.9 OTHER SPECIFIED HEALTH STATUS: Chronic | ICD-10-CM

## 2021-05-07 DIAGNOSIS — D36.10 BENIGN NEOPLASM OF PERIPHERAL NERVES AND AUTONOMIC NERVOUS SYSTEM, UNSPECIFIED: Chronic | ICD-10-CM

## 2021-05-10 ENCOUNTER — NON-APPOINTMENT (OUTPATIENT)
Age: 21
End: 2021-05-10

## 2021-05-10 ENCOUNTER — APPOINTMENT (OUTPATIENT)
Dept: PEDIATRIC HEMATOLOGY/ONCOLOGY | Facility: CLINIC | Age: 21
End: 2021-05-10
Payer: COMMERCIAL

## 2021-05-10 ENCOUNTER — APPOINTMENT (OUTPATIENT)
Dept: OPHTHALMOLOGY | Facility: CLINIC | Age: 21
End: 2021-05-10
Payer: COMMERCIAL

## 2021-05-10 VITALS
HEART RATE: 117 BPM | BODY MASS INDEX: 17.76 KG/M2 | SYSTOLIC BLOOD PRESSURE: 99 MMHG | RESPIRATION RATE: 21 BRPM | WEIGHT: 98.99 LBS | TEMPERATURE: 97.52 F | DIASTOLIC BLOOD PRESSURE: 63 MMHG | HEIGHT: 62.48 IN

## 2021-05-10 DIAGNOSIS — E55.9 VITAMIN D DEFICIENCY, UNSPECIFIED: ICD-10-CM

## 2021-05-10 DIAGNOSIS — H20.9 UNSPECIFIED IRIDOCYCLITIS: ICD-10-CM

## 2021-05-10 PROCEDURE — 99214 OFFICE O/P EST MOD 30 MIN: CPT

## 2021-05-10 PROCEDURE — 92012 INTRM OPH EXAM EST PATIENT: CPT

## 2021-05-10 PROCEDURE — 99072 ADDL SUPL MATRL&STAF TM PHE: CPT

## 2021-05-10 RX ORDER — MULTIVIT-MIN/FOLIC/VIT K/LYCOP 400-300MCG
25 MCG TABLET ORAL
Qty: 90 | Refills: 3 | Status: ACTIVE | COMMUNITY
Start: 2020-02-04

## 2021-05-27 ENCOUNTER — APPOINTMENT (OUTPATIENT)
Dept: MRI IMAGING | Facility: HOSPITAL | Age: 21
End: 2021-05-27
Payer: COMMERCIAL

## 2021-05-27 ENCOUNTER — OUTPATIENT (OUTPATIENT)
Dept: OUTPATIENT SERVICES | Age: 21
LOS: 1 days | End: 2021-05-27

## 2021-05-27 DIAGNOSIS — D36.10 BENIGN NEOPLASM OF PERIPHERAL NERVES AND AUTONOMIC NERVOUS SYSTEM, UNSPECIFIED: Chronic | ICD-10-CM

## 2021-05-27 DIAGNOSIS — C72.30 MALIGNANT NEOPLASM OF UNSPECIFIED OPTIC NERVE: ICD-10-CM

## 2021-05-27 DIAGNOSIS — Z78.9 OTHER SPECIFIED HEALTH STATUS: Chronic | ICD-10-CM

## 2021-05-27 PROCEDURE — 70553 MRI BRAIN STEM W/O & W/DYE: CPT | Mod: 26

## 2021-05-27 PROCEDURE — 72157 MRI CHEST SPINE W/O & W/DYE: CPT | Mod: 26

## 2021-05-27 PROCEDURE — 70543 MRI ORBT/FAC/NCK W/O &W/DYE: CPT | Mod: 26

## 2021-05-27 PROCEDURE — 72158 MRI LUMBAR SPINE W/O & W/DYE: CPT | Mod: 26

## 2021-06-01 ENCOUNTER — NON-APPOINTMENT (OUTPATIENT)
Age: 21
End: 2021-06-01

## 2021-06-01 NOTE — HISTORY OF PRESENT ILLNESS
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  6 years ago she underwent right adrenalectomy and nephrectomy.  Her last visit at [facility] was a little over one year ago.  She follows with opthalmology, nephrology and cardiology regularly.\par \par Cate's  Right lower back pain resolved after resection of intradural tumor last year by Dr Griffith and Kendy in February 2020, was reresected in November for persistent asymptomatic growth.  She has been stable since, some right LE weakness, walks with cane and AFO.\par No side effects from Neurontin [de-identified] : Off Selumetinib since 4/23.  Was on the drug 9 days and developed blurry vision.  Seen by ophtho and diagnosed w/ bilateral anterior uveitis and placed on steroid gtts.  Vision back to baseline per Cate.  Following up today with them for re-evaluation.  Had also experienced acneiform rash on forehead, chest and back that is almost resolved.  \par \par \par No pain, walking now without a cane, using brace alone.

## 2021-06-01 NOTE — PHYSICAL EXAM
[PERRLA] : SUKHDEEP [EOMI] : EOMI  [Motor Exam nomal] : motor exam normal [No Dysmetria] : no dysmetria  [Normal] : affect appropriate [80: Normal activity with effort; some signs or symptoms of disease.] : 80: Normal activity with effort; some signs or symptoms of disease.  [de-identified] : tenderness along right lower back [de-identified] : Large healed post surgical scar on right abdomen  [de-identified] : right foot in AFO [de-identified] : many cafe au lait spots scattered diffusely; acneform rash on forhead and chest fading

## 2021-07-27 ENCOUNTER — APPOINTMENT (OUTPATIENT)
Dept: CARDIOLOGY | Facility: CLINIC | Age: 21
End: 2021-07-27

## 2021-09-20 ENCOUNTER — NON-APPOINTMENT (OUTPATIENT)
Age: 21
End: 2021-09-20

## 2021-09-20 ENCOUNTER — APPOINTMENT (OUTPATIENT)
Dept: OPHTHALMOLOGY | Facility: CLINIC | Age: 21
End: 2021-09-20
Payer: COMMERCIAL

## 2021-09-20 PROCEDURE — 92014 COMPRE OPH EXAM EST PT 1/>: CPT

## 2021-09-20 PROCEDURE — 92134 CPTRZ OPH DX IMG PST SGM RTA: CPT

## 2021-10-11 ENCOUNTER — NON-APPOINTMENT (OUTPATIENT)
Age: 21
End: 2021-10-11

## 2021-10-11 ENCOUNTER — APPOINTMENT (OUTPATIENT)
Dept: OPHTHALMOLOGY | Facility: CLINIC | Age: 21
End: 2021-10-11
Payer: COMMERCIAL

## 2021-10-11 PROCEDURE — 92012 INTRM OPH EXAM EST PATIENT: CPT

## 2021-10-11 PROCEDURE — 92133 CPTRZD OPH DX IMG PST SGM ON: CPT

## 2021-10-19 ENCOUNTER — APPOINTMENT (OUTPATIENT)
Dept: PEDIATRIC NEUROLOGY | Facility: CLINIC | Age: 21
End: 2021-10-19
Payer: COMMERCIAL

## 2021-10-19 ENCOUNTER — APPOINTMENT (OUTPATIENT)
Dept: PEDIATRIC HEMATOLOGY/ONCOLOGY | Facility: CLINIC | Age: 21
End: 2021-10-19
Payer: COMMERCIAL

## 2021-10-19 VITALS
DIASTOLIC BLOOD PRESSURE: 65 MMHG | HEART RATE: 90 BPM | SYSTOLIC BLOOD PRESSURE: 98 MMHG | WEIGHT: 99.43 LBS | HEIGHT: 61.81 IN | BODY MASS INDEX: 18.3 KG/M2

## 2021-10-19 VITALS
HEIGHT: 61.81 IN | HEART RATE: 90 BPM | BODY MASS INDEX: 18.3 KG/M2 | WEIGHT: 99.43 LBS | SYSTOLIC BLOOD PRESSURE: 98 MMHG | DIASTOLIC BLOOD PRESSURE: 65 MMHG

## 2021-10-19 PROCEDURE — 99215 OFFICE O/P EST HI 40 MIN: CPT

## 2021-10-19 RX ORDER — CLINDAMYCIN PHOSPHATE 10 MG/ML
1 SOLUTION TOPICAL
Qty: 1 | Refills: 5 | Status: DISCONTINUED | COMMUNITY
Start: 2021-04-23 | End: 2021-10-19

## 2021-10-25 NOTE — HISTORY OF PRESENT ILLNESS
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  6 years ago she underwent right adrenalectomy and nephrectomy.  [place] [facility] [place] over one year ago.  She follows with opthalmology, nephrology and cardiology regularly.\par \par Cate's  Right lower back pain resolved after resection of intradural tumor last year by Dr Griffith and Kendy in February 2020, was reresected in November for persistent asymptomatic growth.  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 [de-identified] : Vision is back to baseline acuity, still with some mild, occasional photosensitivity.  Per Dr Goldberg, her ophthalmologist, we can reattempt Selumetinib.\par \par She completed the physical therapy for which her insurance approved her and though she still has an abnormal gait, she walks without a cane and has no more pain, off gabapentin.\par \par Using new brace for her right foot which helps with her toes but isn't bulky covering her entire lower leg.

## 2021-10-25 NOTE — PHYSICAL EXAM
[PERRLA] : SUKHDEEP [EOMI] : EOMI  [Motor Exam nomal] : motor exam normal [No Dysmetria] : no dysmetria  [Normal] : affect appropriate [90: Able to carry normal activity; minor signs or symptoms of disease.] : 90: Able to carry normal activity; minor signs or symptoms of disease.  [de-identified] : Large healed post surgical scar on right abdomen; palpable mass left abdomen consistent with known left psoas mass [de-identified] : tenderness along right lower back [de-identified] : right foot in ankle brace that supports toes [de-identified] : many cafe au lait spots scattered diffusely  [de-identified] : multiple areas of numbness and decreased sensation on trunk and lower extremities.  right leg with unstable knee on gait

## 2021-10-25 NOTE — CONSULT LETTER
[Dear  ___] : Dear  [unfilled], [Courtesy Letter:] : I had the pleasure of seeing your patient, [unfilled], in my office today. [Please see my note below.] : Please see my note below. [Consult Closing:] : Thank you very much for allowing me to participate in the care of this patient.  If you have any questions, please do not hesitate to contact me. [Sincerely,] : Sincerely, [DrElaine  ___] : Dr. MCCALLUM [FreeTextEntry2] : Juancarlos Bo MD\par [address] [place] [address], [address]\[place] [address]\par Tel. #: [phone]\par Fax #: [phone] [FreeTextEntry3] : Wan Cerda MD \par Chief, [facility]\par  of Pediatrics\par [facility] [employer] [facility]\par

## 2021-11-19 ENCOUNTER — APPOINTMENT (OUTPATIENT)
Dept: MRI IMAGING | Facility: HOSPITAL | Age: 21
End: 2021-11-19

## 2021-12-13 ENCOUNTER — NON-APPOINTMENT (OUTPATIENT)
Age: 21
End: 2021-12-13

## 2021-12-13 ENCOUNTER — APPOINTMENT (OUTPATIENT)
Dept: PHYSICAL MEDICINE AND REHAB | Facility: CLINIC | Age: 21
End: 2021-12-13
Payer: COMMERCIAL

## 2021-12-13 VITALS
DIASTOLIC BLOOD PRESSURE: 63 MMHG | SYSTOLIC BLOOD PRESSURE: 96 MMHG | OXYGEN SATURATION: 100 % | TEMPERATURE: 96.1 F | HEART RATE: 78 BPM

## 2021-12-13 DIAGNOSIS — G95.9 DISEASE OF SPINAL CORD, UNSPECIFIED: ICD-10-CM

## 2021-12-13 PROCEDURE — 99215 OFFICE O/P EST HI 40 MIN: CPT

## 2021-12-13 NOTE — ASSESSMENT
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 Discussed with patient that I recommended she wear her left carbon fiber AFO inside the home as well as in the community consistently to prevent falling. The patient's right hip abductor weakness is contributing to her balance impairment and will initiate a course of outpatient physical therapy with emphasis on hip girdle strengthening exercises, especially the hip abductors and the development of a home exercise program, see Rx.\par \par I spent a total of 45 minutes on the date of the encounter evaluating and treating the patient including a discussion of treatment options.

## 2021-12-13 NOTE — PHYSICAL EXAM
[FreeTextEntry1] : General: Well developed female in no apparent distress. Patient is awake, alert, and oriented x3. Cooperative.\par HEENT: Normal cephalic, atraumatic. MMM.\par Lungs: Clear to auscultation.\par Cardiac: Regular rate and rhythm.\par Abdomen: Bowel sounds present, nondistended\par Extremities: No cyanosis, clubbing, or edema noted.\par \par Motor:\par Both upper extremities: Tone normal, active range of motion within functional limits with 5/5 motor power throughout. Thumb to digit opposition intact bilaterally\par Left lower extremity: Hip flexion 4+/5 motor power, otherwise 5/5 motor power throughout including the knee flexors.\par \par Right lower extremity: hip flexion 4/5 motor power, hip abduction 4-/5 motor power, hip extension 4-4+/5 motor power, knee flexion 3/5 motor power, knee extension 5/5 motor power,\par Ankle dorsiflexion 3-/5 motor power, plantarflexion 4-4+/5 motor power, ankle inversion 2/5 motor power, ankle eversion 2-/5 motor power.\par \par Sensory: Intact to light touch/pinprick of both upper extremities and the right lower extremity.\par Left lower extremity with diminished pinprick throughout, sharp dull differentiation impaired. Proprioception absent at the left first MTP joint and left ankle, intact at the knee.\par \par Muscle stretch reflexes: 0/+1 both upper extremities and symmetric. +2 right KJ, 0/+1 left KJ, +1 right AJ, 0/+1 left AJ. Babinski positive on the right.\par \par Functional status: Patient ambulated independently with a right carbon fiber AFO with good heel strike,  right uncompensated gluteus medius gait. Mild genu recurvatum on the right. Mild Trendelenburg test on the right. Patient ambulated without her AFO with a right shallow heelstrike/foot flat initial contact with foot inversion.

## 2021-12-13 NOTE — HISTORY OF PRESENT ILLNESS
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 Patient reports weakness in the right lower extremity and has a right carbon fiber AFO. Patient reports numbness in the left lower extremity. No pain complaints. Patient denies any falls in the past 6 months however does report daily near falls due to catching her right toe which predominantly occurs when not wearing her AFO in the home. Patient wears the AFO consistently in the community. Functionally the patient's an independent community ambulator without an assistive device, independent with transfers and activities of daily living. Patient denies focal weakness in the left lower, no bowel bladder incontinence, no back pain. Patient reports that outpatient physical therapy ended several months ago and would like to restart.

## 2021-12-27 ENCOUNTER — APPOINTMENT (OUTPATIENT)
Dept: OPHTHALMOLOGY | Facility: CLINIC | Age: 21
End: 2021-12-27

## 2021-12-28 ENCOUNTER — NON-APPOINTMENT (OUTPATIENT)
Age: 21
End: 2021-12-28

## 2021-12-28 NOTE — PHYSICAL EXAM
[General Appearance - Well Developed] : well developed [Normal Appearance] : normal appearance [Snoring] : snoring [de-identified] : 2 year male here for evaluation of snoring. \par Parents have been noticing snoring for the last several years\par Child has been snoring more than half the time.\par Parents do characterize the snoring as loud with associated heavy breathing. \par Parents do think that there might be pauses or apneas in breathing at night.\par Does note mouth breathing\par Denies enuresis\par Child does not wake up refreshed in the morning and is difficult to wake up.\par Denies any morning headaches\par Does have some sleepiness during the day but denies napping in the afternoon. \par Parents have noted some daytime irritability, behavioral outbursts, and/or hyperactivity. on spectrum\par No previous head and neck surgeries.\par Child does not have any history of allergy symptoms including itching eyes or nose, runny nose, or sneezing.\par Patient has  had a sleep study\par Date of PS21\par Sleep Eff: 97%\par AHI: 2.7\par \par also with speech delay.  known autism spectrum d/o.  Unsure of hearing\par Words now >50 words. In OT and SLP [Well Groomed] : well groomed [General Appearance - Well Nourished] : well nourished [No Deformities] : no deformities [General Appearance - In No Acute Distress] : no acute distress [Normal Conjunctiva] : the conjunctiva exhibited no abnormalities [Eyelids - No Xanthelasma] : the eyelids demonstrated no xanthelasmas [Normal Oral Mucosa] : normal oral mucosa [No Oral Pallor] : no oral pallor [No Oral Cyanosis] : no oral cyanosis [Normal Jugular Venous A Waves Present] : normal jugular venous A waves present [Normal Jugular Venous V Waves Present] : normal jugular venous V waves present [No Jugular Venous Adams A Waves] : no jugular venous adams A waves [Respiration, Rhythm And Depth] : normal respiratory rhythm and effort [Exaggerated Use Of Accessory Muscles For Inspiration] : no accessory muscle use [Auscultation Breath Sounds / Voice Sounds] : lungs were clear to auscultation bilaterally [Heart Rate And Rhythm] : heart rate and rhythm were normal [Heart Sounds] : normal S1 and S2 [Murmurs] : no murmurs present [Abdomen Soft] : soft [Abdomen Tenderness] : non-tender [Abdomen Mass (___ Cm)] : no abdominal mass palpated [Abnormal Walk] : normal gait [Gait - Sufficient For Exercise Testing] : the gait was sufficient for exercise testing [Nail Clubbing] : no clubbing of the fingernails [Cyanosis, Localized] : no localized cyanosis [Petechial Hemorrhages (___cm)] : no petechial hemorrhages [Skin Color & Pigmentation] : normal skin color and pigmentation [] : no rash [No Venous Stasis] : no venous stasis [Skin Lesions] : no skin lesions [No Skin Ulcers] : no skin ulcer [No Xanthoma] : no  xanthoma was observed [Oriented To Time, Place, And Person] : oriented to person, place, and time [Affect] : the affect was normal [Mood] : the mood was normal [No Anxiety] : not feeling anxious

## 2022-01-01 NOTE — ASU PATIENT PROFILE, PEDIATRIC - NS PRO FEEL SAFE YN PEDS
ADMISSION NOTE    Patient: Bg Bud Carrillo  : 2024 at 0828am  MRN: [id number]  Referring Physician:This patient's mother is not on file. This patient's mother is not on file.  Date of admission: 2022     Gestational Age: 38w1d   PMA: 38w3d  Day of Life on Admission (Day of birth being DOL 1): 3    REASON for admission: Truncus Arteriosis    BIRTH HOSPITAL: [facility]     MATERNAL/PRENATAL HISTORY    Age 26 years old G1, P0 (via IVF)  Blood type B+  EDC: 2022    Prenatal laboratory tests:  GBS: negative  GC screen: negative  Chlamydia screen: Negative  HBs Ag: nonreactive  Hepatitis C-not done  RPR or VDRL: nonreactive  Rubella immune or MMR panel negative  HIV: negative  TSH: normal  Pap smear negative  Covid negative  Pregnancy complications: Covid in 2021    Ethnicity: White      Antibiotics: None       Prenatal care: Yes   steroids during pregnancy:  , No, Number of Doses: None   Magnesium Sulfate: No  Chorioamnionitis: No  Maternal HTN, Chronic or Pregnancy Induced: No  Maternal Diabetes: No  Congenital Infection - TORCH / Zika / Parvovirus: No  Maternal COVID-19: negative positive in 2021  Mutiple Gestation: No    Other Pregnancy Complication: None reported      Maternal Medication: PNV      DELIVERY/BIRTH HISTORY    Time of birth: 2022  by  Vaginal, Spontaneous  Sex:  female   GA 38w1d    Labor  · Delivery Method: Vaginal, Spontaneous [250]  · Time of Birth:  22 at 0828  · ROM: 25hours   · Birthweight: 3075g     Delivery Medications: clindamycin     Delayed cord clamping x 30 seconds: unknown    Resuscitation:  Oxygen: N  Face Mask Ventilation (PPV): N  Endotracheal Tube Intubation: N  CPAP: N              APGARS  One minute Five minutes   Skin color:         Heart rate:         Reflex:         Muscle tone:         Breathing:         Totals: 9   9         Delivery Room Course: Infant was brought to warmer and was dried and stimulated.   Bulb suctioning of mouth and nares performed.  HR > 100 bpm and infant was vigorous with good respiratory effort.    Initial NICU course: Infant initially with mother. On 1/30 noted to have intermittent episodes of sighing. No cyanosis. Sats were upper 90% on room air. Echo order for 1/31 AM given murmur.     1/31 at 0215: RN reported sustained tachypnea of 70-80, with periods as rapid as 100, while asleep. Subcostal retractions noted. Sats remained upper-90s. Tachypnea persisted. Repeat CXR ordered. Transferred to [facility] for evaluation for concern of cardiac etiology given tachypnea, murmur, and pulmonary congestion on initial CXR.     Infant received vitamin K and erythromycin. Infant received lasix 1mg/kg x 1 and started on D10W at 60mL/kg/d prior to transfer. Hearing screen passed. CCHD passed. NBS sent. Hep B given.     Laboratory and Radiological Findings:  1/31 0427: 11.5 mg/dL     1/29: 16.9>16.4/46.2<325 N66 L26 M6 Bands 1     1/29 Bcx: NG x 1d    CXR: nonspecific findings. Heart size appears to be unremarkable though the thymus is not prominent suggesting there may be a degree of stress and thymic involution. There is nodular opacity throughout both lungs which may be some retained fluid or mild vascular prominence.     Echo 1/31:  PFO with left to right flow.   Normal pulmonary venous return.   Large VSD with aortic override.   Large aortopulmonary window.   Normal sized branch pulmonary arteries  No PDA.   No CoA.     Physical Exam:     Body Measurement:     Weight: 2905 g    Head Circumference: 32 cm (12.6\")    Length 19.29\" (49 cm)         Vital Signs:   Current Vitals:  Temp: 98.4 °F (36.9 °C) Heart Rate: 146  Resp: (!) 74  BP: 59/38  SpO2: 95 %      General: Well appearing, no acute distress, responds to stimuli  HENT: AFSOF, normcephalic, ears normally set, no cleft, palate intact  Neck: supple, full range of motion  CV: RRR, 2+ pulses, good perfusion  Lungs: clear and equal bilaterally, mild  retractions, no nasal flaring  Abdomen: soft, non-tender, non-distended, no organomegaly  Extremities: no focal deficits; FROM x 4  Neuro: good tone  Skin: no rash  : normal for GA; anus patent  Back: no danny, no dimples      ASSESSMENT AND PLAN BY SYSTEM:     ACTIVE PROBLEMS:  Active Problems:     infant of 37 completed weeks of gestation    Truncus arteriosus with isolated pulmonary artery      Fluids, electrolytes and nutrition:   Plan:   - IVFs with D10 0.2NS - total fluids at 90 ml/kg/day   - Trophic feeds per protocol and after discussing with Cardiology at 7ml Q3 of DMB/MBM  - BMP now and in AM     Respiratory   Respiratory support: high flow nasal cannula  2L FiO2 21%  Plan:   - blood gas with lactate now then VBGs with lactate q4h  - CXR now and in AM   - titrate FiO2 to keep saturations > 90%  - lasix 3mg iv daily    Cardiovascular   Plan:   - Cardiology consult  - stat ECG and ECHO  - Notify cardiology with any decreased cardiac output (decreased pulses/perfusion/ UOP)  -Maintain cardioprotective electrolytes K>4, Mag>2, Ionized Ca>1.2    ID: No concern for infection     Early onset sepsis screen:  Gestational Age: 38w1d          - EOS Screen not performed: Greater or equal to 35 weeks gestation at birth (unable to calculate w/o maternal vitals; CBC and blood culture sent at birth at OSH)     Plan:   - CBC now   - no antibiotics indicated at this time, will monitor clinically  - follow blood culture at OSH     Hyperbilirubin:   Risk factors for hyperbilirubinemia - Routine risk for hyperbilirubinemia   Maternal Blood type B+     Bili at 55 HOL 13.9mg/dL, HIR, LL 16.1mg/dL     Plan:   - bilirubin level in AM  - double photo     Neuro:   Plan:   - screening head ultrasound    Genetics:   - send cards genetic: FISH for DiGeorge and genomic microarray    - screening abdominal ultrasound    Therapy:   - Speech / Physical / Occupational therapy    Lines:   - place PIV  - Cardiology to place central  venous line     Social:   Spoke to parents in-depth. All questions and concerns addressed. Parents expressed verbal understanding.      Zuly Nolan DO  PGY-3 Pediatric Resident, [facility]           no

## 2022-01-16 ENCOUNTER — APPOINTMENT (OUTPATIENT)
Dept: MRI IMAGING | Facility: HOSPITAL | Age: 22
End: 2022-01-16
Payer: COMMERCIAL

## 2022-01-19 ENCOUNTER — RESULT REVIEW (OUTPATIENT)
Age: 22
End: 2022-01-19

## 2022-01-19 ENCOUNTER — APPOINTMENT (OUTPATIENT)
Dept: PHYSICAL MEDICINE AND REHAB | Facility: CLINIC | Age: 22
End: 2022-01-19

## 2022-01-19 ENCOUNTER — OUTPATIENT (OUTPATIENT)
Dept: OUTPATIENT SERVICES | Age: 22
LOS: 1 days | End: 2022-01-19

## 2022-01-19 ENCOUNTER — APPOINTMENT (OUTPATIENT)
Dept: MRI IMAGING | Facility: HOSPITAL | Age: 22
End: 2022-01-19
Payer: COMMERCIAL

## 2022-01-19 DIAGNOSIS — C47.9 MALIGNANT NEOPLASM OF PERIPHERAL NERVES AND AUTONOMIC NERVOUS SYSTEM, UNSPECIFIED: ICD-10-CM

## 2022-01-19 DIAGNOSIS — D36.10 BENIGN NEOPLASM OF PERIPHERAL NERVES AND AUTONOMIC NERVOUS SYSTEM, UNSPECIFIED: Chronic | ICD-10-CM

## 2022-01-19 DIAGNOSIS — Z78.9 OTHER SPECIFIED HEALTH STATUS: Chronic | ICD-10-CM

## 2022-01-19 PROCEDURE — 74183 MRI ABD W/O CNTR FLWD CNTR: CPT | Mod: 26

## 2022-02-03 ENCOUNTER — APPOINTMENT (OUTPATIENT)
Dept: MRI IMAGING | Facility: HOSPITAL | Age: 22
End: 2022-02-03

## 2022-02-03 ENCOUNTER — RESULT REVIEW (OUTPATIENT)
Age: 22
End: 2022-02-03

## 2022-02-03 ENCOUNTER — OUTPATIENT (OUTPATIENT)
Dept: OUTPATIENT SERVICES | Age: 22
LOS: 1 days | End: 2022-02-03

## 2022-02-03 DIAGNOSIS — Z78.9 OTHER SPECIFIED HEALTH STATUS: Chronic | ICD-10-CM

## 2022-02-03 DIAGNOSIS — C71.9 MALIGNANT NEOPLASM OF BRAIN, UNSPECIFIED: ICD-10-CM

## 2022-02-03 DIAGNOSIS — D36.10 BENIGN NEOPLASM OF PERIPHERAL NERVES AND AUTONOMIC NERVOUS SYSTEM, UNSPECIFIED: Chronic | ICD-10-CM

## 2022-02-03 DIAGNOSIS — C72.30 MALIGNANT NEOPLASM OF UNSPECIFIED OPTIC NERVE: ICD-10-CM

## 2022-02-03 DIAGNOSIS — Q85.01 NEUROFIBROMATOSIS, TYPE 1: ICD-10-CM

## 2022-02-03 PROCEDURE — 72157 MRI CHEST SPINE W/O & W/DYE: CPT | Mod: 26

## 2022-02-03 PROCEDURE — 72156 MRI NECK SPINE W/O & W/DYE: CPT | Mod: 26

## 2022-02-03 PROCEDURE — 72158 MRI LUMBAR SPINE W/O & W/DYE: CPT | Mod: 26

## 2022-02-08 ENCOUNTER — NON-APPOINTMENT (OUTPATIENT)
Age: 22
End: 2022-02-08

## 2022-02-22 ENCOUNTER — APPOINTMENT (OUTPATIENT)
Dept: NUCLEAR MEDICINE | Facility: IMAGING CENTER | Age: 22
End: 2022-02-22
Payer: COMMERCIAL

## 2022-02-22 ENCOUNTER — OUTPATIENT (OUTPATIENT)
Dept: OUTPATIENT SERVICES | Facility: HOSPITAL | Age: 22
LOS: 1 days | End: 2022-02-22
Payer: COMMERCIAL

## 2022-02-22 DIAGNOSIS — Z78.9 OTHER SPECIFIED HEALTH STATUS: Chronic | ICD-10-CM

## 2022-02-22 DIAGNOSIS — C47.9 MALIGNANT NEOPLASM OF PERIPHERAL NERVES AND AUTONOMIC NERVOUS SYSTEM, UNSPECIFIED: ICD-10-CM

## 2022-02-22 DIAGNOSIS — D36.10 BENIGN NEOPLASM OF PERIPHERAL NERVES AND AUTONOMIC NERVOUS SYSTEM, UNSPECIFIED: Chronic | ICD-10-CM

## 2022-02-22 PROCEDURE — 78816 PET IMAGE W/CT FULL BODY: CPT | Mod: 26,PS

## 2022-02-22 PROCEDURE — 78816 PET IMAGE W/CT FULL BODY: CPT

## 2022-02-22 PROCEDURE — A9552: CPT

## 2022-02-24 ENCOUNTER — OUTPATIENT (OUTPATIENT)
Dept: OUTPATIENT SERVICES | Age: 22
LOS: 1 days | Discharge: HOME | End: 2022-02-24

## 2022-02-24 DIAGNOSIS — D36.10 BENIGN NEOPLASM OF PERIPHERAL NERVES AND AUTONOMIC NERVOUS SYSTEM, UNSPECIFIED: Chronic | ICD-10-CM

## 2022-02-24 DIAGNOSIS — Z78.9 OTHER SPECIFIED HEALTH STATUS: Chronic | ICD-10-CM

## 2022-02-25 ENCOUNTER — RESULT REVIEW (OUTPATIENT)
Age: 22
End: 2022-02-25

## 2022-02-25 ENCOUNTER — APPOINTMENT (OUTPATIENT)
Dept: PEDIATRIC HEMATOLOGY/ONCOLOGY | Facility: CLINIC | Age: 22
End: 2022-02-25

## 2022-02-25 VITALS
DIASTOLIC BLOOD PRESSURE: 58 MMHG | RESPIRATION RATE: 21 BRPM | HEIGHT: 61.26 IN | SYSTOLIC BLOOD PRESSURE: 115 MMHG | BODY MASS INDEX: 18.57 KG/M2 | OXYGEN SATURATION: 100 % | WEIGHT: 99.65 LBS | TEMPERATURE: 98.06 F | HEART RATE: 89 BPM

## 2022-02-25 PROCEDURE — XXXXX: CPT

## 2022-02-25 RX ORDER — PREDNISOLONE ACETATE 10 MG/ML
1 SUSPENSION/ DROPS OPHTHALMIC
Refills: 0 | Status: DISCONTINUED | COMMUNITY
Start: 2021-04-26 | End: 2022-02-25

## 2022-03-29 ENCOUNTER — APPOINTMENT (OUTPATIENT)
Dept: PEDIATRIC SURGERY | Facility: CLINIC | Age: 22
End: 2022-03-29
Payer: COMMERCIAL

## 2022-03-29 VITALS
DIASTOLIC BLOOD PRESSURE: 70 MMHG | HEART RATE: 97 BPM | BODY MASS INDEX: 18.49 KG/M2 | HEIGHT: 61.26 IN | OXYGEN SATURATION: 100 % | WEIGHT: 99.21 LBS | TEMPERATURE: 98.2 F | SYSTOLIC BLOOD PRESSURE: 111 MMHG

## 2022-03-29 PROCEDURE — 99204 OFFICE O/P NEW MOD 45 MIN: CPT

## 2022-04-02 NOTE — HISTORY OF PRESENT ILLNESS
[FreeTextEntry1] : Cate is a 22 year old female with Neurofibromatosis type I and optic glioma who underwent right adrenalectomy followed by right nephrectomy for a perpheral nerve sheath tumor of the right adrenal gland.  She has also undergone resection of a intradural tumor in 2020, with further resection in 2021 for persistent growth.  She has attempted treatment with Selumetinib that was complicated by uveitis so this was since discontinued and her vision has improved.  She has been followed for a left retroperitoneal mass that on recent MRI has shown interval growth.  She is completely asymptomatic from this and denies any right back pain or abdominal pain.  She has not had any recent fevers.  She denies any appetite changes, change in weight or constipation.  She is here today with mom to discuss the option of surgical resection of her left retroperitoneal mass.

## 2022-04-02 NOTE — REASON FOR VISIT
[Initial - Scheduled] : an initial, scheduled visit with concerns of [Patient] : patient [Mother] : mother [FreeTextEntry4] : Dr Wan Cerda

## 2022-04-02 NOTE — ASSESSMENT
[FreeTextEntry1] : Cate is a 22 year old female with NF1, optic glioma who underwent R adrenalectomy and nephrectomy for a peripheral nerve sheath tumor of the right retroperitoneum here today to discuss treatment options for a left retroperitoneal mass.  This was seen on MRI in 2019 that demonstrated a left para aortic neurofibroma measuring 3.5 x 3.0cm.  In May 2019 it measured 5.9 x 3.7 x 3.8cm.   In December 2019 it measured 7.3 x 4.5 cm.  An MRI of the lumbar spine in May 2021 demonstrated it to be stable in size.   The most recent MRI in January 2022 demonstrated interval growht of the lesion now measuring 10.1 x 5.6 cm.  Dr Davies and I met with Cate and her mother today to discuss the option of surgical resection of this left sided mass versus continued close surveillance.  We reviewed the indications, risks, benefits and alternatives of the procedure in detail.  We discussed that the only way to definitively know the pathology of the mass is with resection.  However, we discussed the risks of surgical resection including but not limited to bleeding, infection, injury to adjacent structures, etc.  We spent time discussing the proximity of the mass to the left kidney and likely the left ureter, although this was not visualized well on the most recent imaging, likely due to compression.  We reviewed the significant implications should the kidney be affected by the resection and that this would result in renal replacement.  We discussed that although the tumor does appear resectable and separate from the kidney itself as well as the renal vasculature, that it is often difficult to know the extent of the mass and its relation to these important structures until the procedure itself.  We discussed that we would likely have urology place a ureteral stent prior to the procedure.  The risks of proceeding with observation were also discussed, including the risk of continued growth of the mass, which may lead to further risk to the left kidney in the future.  We answered both zabrina's and Cate's questions to the best of our ability.  They demonstrate understanding of this difficult decision.  We have discussed this as well with Dr Cerda, her primary oncologist.  At this point, given it has been over two months since her most recent imaging, we agree to proceed with a short interval MRI in approximately one month, which will be 3 months since her prior imaging.  We have discussed her case with Dr Trammell of pediatric radiologist who recommended an MR Urogram to better assess the left ureter as well as the proximity of the tumor to the left kidney and renal vasculature.   Zabrina and Cate are in agreement with this plan.  We have also discussed this case with Dr Hamlin of adult surgical oncology who is in agreement with these recommendations.  We will follow up with Cate after the MRI to review the results and finalize a treatment plan.  They know to contact us in the interim with any further questions or concerns.

## 2022-04-02 NOTE — CONSULT LETTER
[Dear  ___] : Dear  [unfilled], [Consult Letter:] : I had the pleasure of evaluating your patient, [unfilled]. [Please see my note below.] : Please see my note below. [Consult Closing:] : Thank you very much for allowing me to participate in the care of this patient.  If you have any questions, please do not hesitate to contact me. [Sincerely,] : Sincerely, [FreeTextEntry2] : Dr Juancarlos Bo\par [address] \[place] [address] [FreeTextEntry3] : Kamaljit Marr MD\par Division of Pediatric, General, Thoracic and Endoscopic Surgery\par [facility]

## 2022-04-19 VITALS — WEIGHT: 99.21 LBS | HEIGHT: 61.26 IN

## 2022-04-19 RX ORDER — FUROSEMIDE 40 MG
20 TABLET ORAL ONCE
Refills: 0 | Status: COMPLETED | OUTPATIENT
Start: 2022-04-25 | End: 2022-04-25

## 2022-04-25 ENCOUNTER — RESULT REVIEW (OUTPATIENT)
Age: 22
End: 2022-04-25

## 2022-04-25 ENCOUNTER — APPOINTMENT (OUTPATIENT)
Dept: MRI IMAGING | Facility: HOSPITAL | Age: 22
End: 2022-04-25
Payer: COMMERCIAL

## 2022-04-25 ENCOUNTER — OUTPATIENT (OUTPATIENT)
Dept: OUTPATIENT SERVICES | Age: 22
LOS: 1 days | End: 2022-04-25

## 2022-04-25 DIAGNOSIS — D36.10 BENIGN NEOPLASM OF PERIPHERAL NERVES AND AUTONOMIC NERVOUS SYSTEM, UNSPECIFIED: ICD-10-CM

## 2022-04-25 DIAGNOSIS — D36.10 BENIGN NEOPLASM OF PERIPHERAL NERVES AND AUTONOMIC NERVOUS SYSTEM, UNSPECIFIED: Chronic | ICD-10-CM

## 2022-04-25 DIAGNOSIS — Z78.9 OTHER SPECIFIED HEALTH STATUS: Chronic | ICD-10-CM

## 2022-04-25 PROCEDURE — 74183 MRI ABD W/O CNTR FLWD CNTR: CPT | Mod: 26

## 2022-04-25 PROCEDURE — 72197 MRI PELVIS W/O & W/DYE: CPT | Mod: 26

## 2022-04-25 RX ORDER — SODIUM CHLORIDE 9 MG/ML
900 INJECTION INTRAMUSCULAR; INTRAVENOUS; SUBCUTANEOUS ONCE
Refills: 0 | Status: COMPLETED | OUTPATIENT
Start: 2022-04-25 | End: 2022-04-25

## 2022-04-25 RX ADMIN — SODIUM CHLORIDE 1200 MILLILITER(S): 9 INJECTION INTRAMUSCULAR; INTRAVENOUS; SUBCUTANEOUS at 13:33

## 2022-04-25 RX ADMIN — Medication 4 MILLIGRAM(S): at 14:40

## 2022-04-30 NOTE — PATIENT PROFILE PEDIATRIC. - ABILITY TO HEAR (WITH HEARING AID OR HEARING APPLIANCE IF NORMALLY USED):
Psychology Progress Note
Psychology Progress Note
Adequate: hears normal conversation without difficulty
Event Note

## 2022-05-16 ENCOUNTER — APPOINTMENT (OUTPATIENT)
Dept: PEDIATRIC SURGERY | Facility: CLINIC | Age: 22
End: 2022-05-16
Payer: COMMERCIAL

## 2022-05-16 VITALS — WEIGHT: 102.29 LBS

## 2022-05-16 PROCEDURE — 99215 OFFICE O/P EST HI 40 MIN: CPT

## 2022-05-20 NOTE — DATA REVIEWED
[de-identified] : \par ACC: [id number] EXAM:  MR PELVIS WAW IC                      \par ACC: [id number] EXAM:  MR ABDOMEN WAW IC                      \par \par PROCEDURE DATE:  04/25/2022  \par \par \par \par INTERPRETATION:  CLINICAL INFORMATION: Neurofibromatosis type I, \par plexiform neurofibroma\par \par COMPARISON: PET/CT 2/22/22, MR abdomen 1/19/2022, MR C/T/L spine 2/3/2022\par \par CONTRAST/COMPLICATIONS:\par IV Contrast: Gadavist  4.5 cc administered   3 cc discarded\par Oral Contrast: NONE\par Complications: None reported at time of study completion\par \par PROCEDURE:\par MRI of the abdomen and pelvis was performed as per MR Urogram protocol.\par -\par \par FINDINGS:\par LOWER CHEST: No pulmonary consolidation or pleural effusion.\par \par LIVER: Normal morphology. The lesion within the right lobe of the liver, \par segment 8 is faintly visualized on this study, and difficult to evaluate. \par The caudate lesion is not well seen as well.\par BILE DUCTS: Normal caliber.\par GALLBLADDER: Within normal limits.\par SPLEEN: Within normal limits.\par PANCREAS: Within normal limits.\par ADRENALS: Within normal limits.\par KIDNEYS/URETERS: The right kidney is not present. There is no left \par hydroureteronephrosis. The left ureter courses posterolateral to the \par large left para-aortic neurofibroma, abutting the lesion but without \par encasement. Redemonstrated small left lower pole non-enhancing cyst.\par \par BLADDER: Within normal limits.\par REPRODUCTIVE ORGANS: Uterus and adnexa within normal limits.\par \par BOWEL: No bowel obstruction.\par PERITONEUM: No ascites.\par VESSELS: Within normal limits.\par \par RETROPERITONEUM/LYMPH NODES: Multiple retroperitoneal and paraspinal \par neurofibromas are again seen and are overall similar in size. For \par reference, the large left para-aortic neurofibroma measures 6.1 cm x 5.4 \par cm x 11.0 cm (previously on 1/19/2022 measured 5.8 cm x 5.6 cm x 10.1 cm) \par and abuts the left kidney and ureter with mass effect. A left inguinal \par neurofibroma measures 2.7 cm x 2.7 cm and is similar in size to PET/CT \par 2/22/2022 allowing for differences in technique.\par \par ABDOMINAL WALL: Multiple cutaneous neurofibromas are again seen, similar \par to prior or slightly larger. Reference right anterolateral lesion \par measures 2.8 cm x 1.9 cm (previously 2.8 cm x 2.0 cm).\par \par BONES: Postsurgical changes in the thoracic and lumbar spine are again \par noted, though this study was not protocoled for dedicated evaluation of \par the spine. Expansile left S1 neurofibroma is stable in size.\par \par IMPRESSION:\par 1. Large left para-aortic neurofibroma is slightly increased in size with \par redemonstrated abutment and mass effect upon the left kidney and ureter. \par The left ureter courses posterolateral to the mass without encasement.\par \par 2. Multiple retroperitoneal, paraspinal, and cutaneous neurofibromas are \par similar is size or slightly larger compared to prior examination.\par \par \par \par --- End of Report ---\par \par \par \par \par KISHA LÓPEZ MD; Resident Radiologist\par This document has been electronically signed.\par LYLA MONTELONGO MD; Attending Radiologist\par This document has been electronically signed. Apr 26 2022 10:34AM

## 2022-05-20 NOTE — ADDENDUM
[FreeTextEntry1] : Documented by Ino Daugherty acting as a scribe for Dr. Marr on 05/16/2022.\par \par All medical record entries made by the Scribe were at my, Dr. Marr, direction and personally dictated by me on 05/16/2022. I have reviewed the chart and agree that the record accurately reflects my personal performances of the history, physical exam, assessment and plan. I have also personally directed, reviewed, and agree with the discharge instructions.

## 2022-05-20 NOTE — REASON FOR VISIT
[Follow-up - Scheduled] : a follow-up, scheduled visit for [Patient] : patient [Mother] : mother [FreeTextEntry3] : para-aortic neurofibroma [FreeTextEntry4] : Dr Wan Cerda

## 2022-05-20 NOTE — ASSESSMENT
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 She is currently followed for a left para aortic neurofibroma. Most recent MR urogram done on 04/26 demonstrated a 11.0 x 5.4 cm neurofibroma, slightly increased in size when compared to the prior study measuring 10.1 x 5.6 cm. Multiple retroperitoneal and paraspinal neurofibromas were also visualized. The left ureter courses posterolateral to the neurofibroma, abutting the lesion but without encasement.  Her case and imaging were discussed at [facility] tumor board, as well as with Dr Yon Quezada of urology oncology, and she is here today with mom to discuss the imaging and the next steps in her care.  Dr Davies and I met with Cate and zabrina and reviewed the imaging results and treatment options, including continued observation versus surgical resection.  We reviewed with them that after review, the mass does appear to be resectable at this point in time.  We discussed with them that the left ureter does track adjacent to the mass and it is difficult to assess its relation to the mass until the time of the procedure.  They understand that we would recommend a ureteral stent be placed, and that while this will assist with identification of the ureter.  They understand that depending on the intra operative findings, a ureteral reconstruction may need to be performed in which case Dr Quezada would assist.  They understand the possibility of positive margins that may warrant further treatment or resection should the pathology be consistent with malignancy.  We again reviewed the risks of surgical intervention including but not limited to bleeding, infection, injury to adjacent structures, specifically the left kidney and ureter.  We reinforced the implications of this given her prior right nephrectomy.  They understand we will be focusing our resection on the left retroperitoneal mass and not addressing her various other neurofibromas.  \par \par We briefly discussed the alternative of biopsy with possible radiation should this be a malignancy, but both Cate and zabrina are in agreement that they would like to proceed with surgical resection.  They demonstrate good understanding and share concerns that if we postpone intervention, the risk to the left kidney may be increased in the future.  \par \par At this time, we will begin surgical planning for myself and Dr Davies and assure that Dr Quezada is available for stent placement and possible further intervention.  We will contact them in the upcoming weeks to confirm the timing.  They know to contact us sooner with any further questions or concerns.

## 2022-05-20 NOTE — HISTORY OF PRESENT ILLNESS
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  She was last seen here approximately 6 weeks ago.  She is here today after a follow-up MRI to review the imaging.  She denies any new symptoms since her last visit.  She has been feeling well without any back pain or other complaints.  She has not had any recent fevers.

## 2022-05-20 NOTE — CONSULT LETTER
[Dear  ___] : Dear  [unfilled], [Consult Letter:] : I had the pleasure of evaluating your patient, [unfilled]. [Please see my note below.] : Please see my note below. [Consult Closing:] : Thank you very much for allowing me to participate in the care of this patient.  If you have any questions, please do not hesitate to contact me. [Sincerely,] : Sincerely, [FreeTextEntry2] : Dr Juancarlos Bo\par [address] \[place] [address]  [FreeTextEntry3] : Kamaljit Marr MD\par Division of Pediatric, General, Thoracic and Endoscopic Surgery\par [facility]

## 2022-06-15 NOTE — H&P PST ADULT - NS ABD PE RECTAL EXAM
Hello,    I work in the outpatient clinic with Dr. Mcdonald. Nurse, Den asked me to send a couple websites for you to to purchase a nebulizer through.  A nebulizer was not reimbursable under your insurance, but there are a few affordable options.      InnoSpire Essence Nebulizer Compressor - Just Nebulizers    JOE Vios - Just Nebulizers      JOE Vios 'Go Green!' Adult Nebulizer System with LC Sprint ([email or web address])  JOE PRONEB Max Nebulizer System  Nebulizer Compressor System ([email or web address])      Tammy Ann  RRT, NPS, CPFT, AE-C, Pediatric Outpatient Clinic  [address] [address]  O: [phone] (internal: )   No mass or lesion

## 2022-06-18 NOTE — PROGRESS NOTE PEDS - SUBJECTIVE AND OBJECTIVE BOX
HPI:  19yo F with PMH significant for Neurofibromatosis Type 1, optic glioma, solitary kidney and albuterol allergy now POD #0 from T9-12 laminectomy with intramedullar tumor resection. Lost RLE potentials during procedure. Patient in and out of sedation on arrival to PICU.    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  2020- resection of neurofibroma   PMH: NF1, Optic glioma, focal nodular hyperplasia   Home meds: gabapentin 300 mg in am, 700mg in pm  (18 Nov 2020 15:26)      OVERNIGHT EVENTS:  Doing well, no overnight events.     Vital Signs Last 24 Hrs  T(C): 36.9 (23 Nov 2020 05:00), Max: 36.9 (23 Nov 2020 05:00)  T(F): 98.4 (23 Nov 2020 05:00), Max: 98.4 (23 Nov 2020 05:00)  HR: 45 (23 Nov 2020 05:00) (45 - 61)  BP: 100/67 (23 Nov 2020 05:00) (90/43 - 102/56)  BP(mean): 74 (23 Nov 2020 05:00) (55 - 74)  RR: 17 (23 Nov 2020 05:00) (14 - 17)  SpO2: 97% (23 Nov 2020 05:00) (97% - 100%)    I&O's Summary    22 Nov 2020 07:01  -  23 Nov 2020 07:00  --------------------------------------------------------  IN: 1000 mL / OUT: 1036 mL / NET: -36 mL        PHYSICAL EXAM:  Mental Status: Awake, Alert, Affect appropriate  PERRL, EOMI  Motor:  R hip 1/5  R KF 2/5, R KE 4/5, 0/5 plant/dors flexion   L side 5/5       Incision/Wound: c/d/i    TUBES/LINES:        DIET:    [ ] Regular    LABS:                CULTURES:      CSF Analysis:       Drug Levels:       Allergies    albuterol (Rash)          MEDICATIONS:  Antibiotics:    Neuro:  acetaminophen   Oral Liquid - Peds. 650 milliGRAM(s) Oral every 6 hours PRN  diazepam  Oral Tab/Cap - Peds 5 milliGRAM(s) Oral every 6 hours PRN  gabapentin Oral Tab/Cap - Peds 900 milliGRAM(s) Oral at bedtime  oxyCODONE   IR Oral Tab/Cap - Peds 5 milliGRAM(s) Oral every 4 hours PRN    Anticoagulation    OTHER:  dexAMETHasone  Oral Tab/Cap - Peds 3 milliGRAM(s) Oral every 8 hours  dexAMETHasone  Oral Tab/Cap - Peds   Oral   lansoprazole  DR Oral Tab/Cap - Peds 30 milliGRAM(s) Oral daily  polyethylene glycol 3350 Oral Powder - Peds 17 Gram(s) Oral daily PRN  senna 8.6 milliGRAM(s) Oral Tablet - Peds 1 Tablet(s) Oral daily PRN    IVF:  cholecalciferol Oral Tab/Cap - Peds 1000 Unit(s) Oral daily        RADIOLOGY & ADDITIONAL TESTS:  < from: MR Thoracic Spine w/wo IV Cont (11.19.20 @ 17:53) >    IMPRESSION: After T9-T12 laminectomy the intramedullary mass within the cord previously identified on 9/16/2020 has been removed. Postoperative changes are identified involving the spinal cord as well as the dorsal soft tissues at the level of the laminectomies.    < end of copied text >   Attending MD Flores :   PHYSICAL EXAM:    GENERAL: NAD  HEENT:  Atraumatic  CHEST/LUNG: Chest rise equal bilaterally  HEART: Regular rate and rhythm  ABDOMEN: Soft, Nontender, Nondistended  EXTREMITIES:  Extremities warm  PSYCH: A&Ox3  SKIN: No obvious rashes or lesions  NEUROLOGY: grossly similar to ED findings yesterday. please refer to neurology note for detailed neurologic assessment as this was deferred to their team whom was at bedside.

## 2022-06-20 ENCOUNTER — OUTPATIENT (OUTPATIENT)
Dept: OUTPATIENT SERVICES | Facility: HOSPITAL | Age: 22
LOS: 1 days | End: 2022-06-20

## 2022-06-20 VITALS
HEART RATE: 66 BPM | RESPIRATION RATE: 16 BRPM | WEIGHT: 98.99 LBS | DIASTOLIC BLOOD PRESSURE: 68 MMHG | TEMPERATURE: 97 F | OXYGEN SATURATION: 99 % | SYSTOLIC BLOOD PRESSURE: 100 MMHG | HEIGHT: 61.5 IN

## 2022-06-20 DIAGNOSIS — Z78.9 OTHER SPECIFIED HEALTH STATUS: Chronic | ICD-10-CM

## 2022-06-20 DIAGNOSIS — Z98.890 OTHER SPECIFIED POSTPROCEDURAL STATES: Chronic | ICD-10-CM

## 2022-06-20 DIAGNOSIS — Z01.818 ENCOUNTER FOR OTHER PREPROCEDURAL EXAMINATION: ICD-10-CM

## 2022-06-20 DIAGNOSIS — D36.10 BENIGN NEOPLASM OF PERIPHERAL NERVES AND AUTONOMIC NERVOUS SYSTEM, UNSPECIFIED: Chronic | ICD-10-CM

## 2022-06-20 DIAGNOSIS — R19.00 INTRA-ABDOMINAL AND PELVIC SWELLING, MASS AND LUMP, UNSPECIFIED SITE: ICD-10-CM

## 2022-06-20 LAB
ALBUMIN SERPL ELPH-MCNC: 4.6 G/DL — SIGNIFICANT CHANGE UP (ref 3.3–5)
ALP SERPL-CCNC: 96 U/L — SIGNIFICANT CHANGE UP (ref 40–120)
ALT FLD-CCNC: 17 U/L — SIGNIFICANT CHANGE UP (ref 4–33)
ANION GAP SERPL CALC-SCNC: 13 MMOL/L — SIGNIFICANT CHANGE UP (ref 7–14)
AST SERPL-CCNC: 20 U/L — SIGNIFICANT CHANGE UP (ref 4–32)
BILIRUB SERPL-MCNC: 0.6 MG/DL — SIGNIFICANT CHANGE UP (ref 0.2–1.2)
BLD GP AB SCN SERPL QL: NEGATIVE — SIGNIFICANT CHANGE UP
BUN SERPL-MCNC: 20 MG/DL — SIGNIFICANT CHANGE UP (ref 7–23)
CALCIUM SERPL-MCNC: 9.4 MG/DL — SIGNIFICANT CHANGE UP (ref 8.4–10.5)
CHLORIDE SERPL-SCNC: 102 MMOL/L — SIGNIFICANT CHANGE UP (ref 98–107)
CO2 SERPL-SCNC: 22 MMOL/L — SIGNIFICANT CHANGE UP (ref 22–31)
CREAT SERPL-MCNC: 0.8 MG/DL — SIGNIFICANT CHANGE UP (ref 0.5–1.3)
EGFR: 105 ML/MIN/1.73M2 — SIGNIFICANT CHANGE UP
GLUCOSE SERPL-MCNC: 72 MG/DL — SIGNIFICANT CHANGE UP (ref 70–99)
HCG UR QL: NEGATIVE — SIGNIFICANT CHANGE UP
HCT VFR BLD CALC: 37.5 % — SIGNIFICANT CHANGE UP (ref 34.5–45)
HGB BLD-MCNC: 12.9 G/DL — SIGNIFICANT CHANGE UP (ref 11.5–15.5)
MCHC RBC-ENTMCNC: 32.9 PG — SIGNIFICANT CHANGE UP (ref 27–34)
MCHC RBC-ENTMCNC: 34.4 GM/DL — SIGNIFICANT CHANGE UP (ref 32–36)
MCV RBC AUTO: 95.7 FL — SIGNIFICANT CHANGE UP (ref 80–100)
NRBC # BLD: 0 /100 WBCS — SIGNIFICANT CHANGE UP
NRBC # FLD: 0 K/UL — SIGNIFICANT CHANGE UP
PLATELET # BLD AUTO: 249 K/UL — SIGNIFICANT CHANGE UP (ref 150–400)
POTASSIUM SERPL-MCNC: 4.4 MMOL/L — SIGNIFICANT CHANGE UP (ref 3.5–5.3)
POTASSIUM SERPL-SCNC: 4.4 MMOL/L — SIGNIFICANT CHANGE UP (ref 3.5–5.3)
PROT SERPL-MCNC: 7.4 G/DL — SIGNIFICANT CHANGE UP (ref 6–8.3)
RBC # BLD: 3.92 M/UL — SIGNIFICANT CHANGE UP (ref 3.8–5.2)
RBC # FLD: 12.3 % — SIGNIFICANT CHANGE UP (ref 10.3–14.5)
RH IG SCN BLD-IMP: POSITIVE — SIGNIFICANT CHANGE UP
SODIUM SERPL-SCNC: 137 MMOL/L — SIGNIFICANT CHANGE UP (ref 135–145)
WBC # BLD: 4.91 K/UL — SIGNIFICANT CHANGE UP (ref 3.8–10.5)
WBC # FLD AUTO: 4.91 K/UL — SIGNIFICANT CHANGE UP (ref 3.8–10.5)

## 2022-06-20 PROCEDURE — 93010 ELECTROCARDIOGRAM REPORT: CPT

## 2022-06-20 RX ORDER — GABAPENTIN 400 MG/1
900 CAPSULE ORAL
Qty: 0 | Refills: 0 | DISCHARGE

## 2022-06-20 NOTE — H&P PST ADULT - MUSCULOSKELETAL COMMENTS
s/p excision of large neurofibroma to right upper region of back in Feb 2020. Now c/o recurrent neurofibroma to thoracic region which has increased in size, she reports intermittent pain. See MRI results below from 9-16-20.  Denies any bowel or bladder function changes. s/p excision of large neurofibroma to right upper region of back in Feb 2020. Now c/o recurrent neurofibroma to thoracic region which has increased in size,s/p Thoracic laminectomy . s/p excision of large neurofibroma to right upper region of back in Feb 2020. Pt with  recurrent neurofibroma ,s/p Thoracic laminectomy .11/21

## 2022-06-20 NOTE — H&P PST ADULT - CARDIOVASCULAR COMMENTS
Most recent Cardiac evaluation Feb 2020.  Echocardiogram was completed with no significant changes noted from 2017.  Denies any s/s of cardiac origin. Most recent Cardiac evaluation March 2021 .  Echocardiogram 3/2022 ; pt denies h/o cp, denies palpitations, denies sob Echocardiogram 3/2021 ; pt denies h/o cp, denies palpitations, denies sob

## 2022-06-20 NOTE — H&P PST ADULT - GASTROINTESTINAL COMMENTS
healed surgical scar (right flank extending to abdomen), +pierced naval. Hx of liver biopsy in 2019 d/t multiple lesions found on liver which were consistent with focal nodular hyperplasia.  Denies any concerns or follow up since. Hx of liver biopsy in 2019 d/t multiple lesions found on liver ; pt states " stable "

## 2022-06-20 NOTE — H&P PST ADULT - CONSTITUTIONAL COMMENTS
Thin, acyanotic, cooperative, pleasant, in NAD. healed surgical scar to right side of occiput. Thin, cooperative, pleasant, in NAD. .

## 2022-06-20 NOTE — H&P PST ADULT - NEUROLOGICAL COMMENTS
follows with Dr. Guillen, annually. Most recent consult attached. 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 follows with Dr. Guillen, annually. NF type 1

## 2022-06-20 NOTE — H&P PST ADULT - NSICDXPASTSURGICALHX_GEN_ALL_CORE_FT
PAST SURGICAL HISTORY:  Central venous catheter in place 2010, out 2011    Neurofibroma excision of right occipital plexiform neurofibroma, 10/2014  Resection Feb 2020    Other Adrenal Hypofunction s/p adrenalectomy right 2010    S/P laminectomy     S/P Nephrectomy right 2010     PAST SURGICAL HISTORY:  Central venous catheter in place 2010, out 2011    Neurofibroma excision of right occipital plexiform neurofibroma, 10/2014  Resection Feb 2020    Other Adrenal Hypofunction s/p adrenalectomy right 2010    S/P laminectomy THoracic 11/21    S/P Nephrectomy right 2010

## 2022-06-20 NOTE — H&P PST ADULT - NOTES
currently working at [employer], attended [employer] school, interested in 3D animation. currently working at [employer],

## 2022-06-20 NOTE — H&P PST ADULT - REASON FOR ADMISSION
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 Larry at [facility]. "The mri showed a growth on my left side "

## 2022-06-20 NOTE — H&P PST ADULT - NSICDXPASTMEDICALHX_GEN_ALL_CORE_FT
PAST MEDICAL HISTORY:  Adrenal Cortex Neoplasm     Cafe-au-lait spots     Nerve sheath tumor     Neurofibromatosis, Type 1     Optic glioma     Solitary kidney s/p nephrectomy     PAST MEDICAL HISTORY:  Adrenal Cortex Neoplasm     Cafe-au-lait spots     Nerve sheath tumor     Neurofibromatosis, Type 1     Optic glioma Pt states last MRI 5/22    Solitary kidney s/p nephrectomy

## 2022-06-20 NOTE — H&P PST ADULT - GENITOURINARY COMMENTS
s/p right nephrectomy and adrenalectomy, currently does not follow with nephrology.  See HPI. s/p right nephrectomy and adrenalectomy, currently does not follow with nephrology.

## 2022-06-20 NOTE — H&P PST ADULT - HISTORY OF PRESENT ILLNESS
Pt is a yo F with PMH significant for Neurofibromatosis Type 1 2011 , optic glioma, so.    She is s/p right adrenalectomy and nephrectomy due to nerve sheath tumor - completed chemotherapy and radiation treatment in March 2011, s/p excision of right occipital plexiform neurofibroma in 2014 and s/p resection of a neurofibroma to her back in Feb 2020, pt s/p Thoracic laminectomy for resection spinal cord tumor , osteoplastic reconstruction.. Pt reports recent MRI/ Pet scan ; pt states There is a mass ; pt now presents for Resection of Left Peritoneal Mass ; see plan of care regarding procedure .     Pt is a yo F with PMH significant for Neurofibromatosis  2011 .  She is s/p right adrenalectomy and nephrectomy due to nerve sheath tumor - completed chemotherapy and radiation treatment in March 2011, s/p excision of right occipital plexiform neurofibroma in 2014 and s/p resection of a neurofibroma to her back in Feb 2020, pt s/p Thoracic laminectomy for resection spinal cord tumor , osteoplastic reconstruction 11/2020. .. Pt reports recent MRI/ Pet scan ; pt states There is a mass ; pt now presents for Resection of Left Peritoneal Mass ; see plan of care regarding procedure .     Pt is a yo F with PMH significant for Neurofibromatosis  2011 .  She is s/p right adrenalectomy and nephrectomy due to nerve sheath tumor - completed chemotherapy and radiation treatment in March 2011, s/p excision of right occipital plexiform neurofibroma in 2014 and s/p resection of a neurofibroma to her back in Feb 2020, pt s/p Thoracic laminectomy for resection spinal cord tumor , osteoplastic reconstruction 11/2020. .. Pt reports recent MRI/ Pet scan ; pt states "There is a mass" ; pt now presents for Resection of Left Peritoneal Mass ; see plan of care regarding procedure .

## 2022-06-20 NOTE — H&P PST ADULT - ATTENDING COMMENTS
Pt for resection of left retroperitoneal mass  Indications, risks, benefits and alternatives discussed with Cate and zabrina  Risks discussed included but not limited to bleeding, infection, injury to intra-abdominal/pelvic/adjacent contents, positive margins, injury to left kidney/ureter and implications discussed, etc  Postoperative expectations reviewed  All questions answered  Informed consent signed

## 2022-06-20 NOTE — H&P PST ADULT - PROBLEM SELECTOR PLAN 1
Procedure as booked ; Resection of Left Peritoneal Mass   Dr Quezada to add codes     Pre op instructions reviewed with pt including Hibiclens with teach back ; pt verbalized good understanding of pre op instructions    Cup provided for [facility] dos    Pt aware pre op Covid test requirement

## 2022-06-20 NOTE — H&P PST ADULT - OPHTHALMOLOGIC COMMENTS
h/o optic glioma, followed by MRI imaging q6months, most recent 9-16-20 no visual impairment. h/o optic glioma, followed by MRI imaging q6months, most recent mri 5/22 h/o optic glioma, followed by MRI imaging q6months, most recent mri 5/22.

## 2022-06-20 NOTE — H&P PST ADULT - NEGATIVE NEUROLOGICAL SYMPTOMS
no transient paralysis/no weakness/no paresthesias/no generalized seizures no paresthesias/no generalized seizures

## 2022-06-21 LAB
CULTURE RESULTS: SIGNIFICANT CHANGE UP
SPECIMEN SOURCE: SIGNIFICANT CHANGE UP

## 2022-07-07 DIAGNOSIS — Z01.818 ENCOUNTER FOR OTHER PREPROCEDURAL EXAMINATION: ICD-10-CM

## 2022-07-08 ENCOUNTER — APPOINTMENT (OUTPATIENT)
Dept: PEDIATRIC SURGERY | Facility: CLINIC | Age: 22
End: 2022-07-08

## 2022-07-11 ENCOUNTER — TRANSCRIPTION ENCOUNTER (OUTPATIENT)
Age: 22
End: 2022-07-11

## 2022-07-11 LAB — SARS-COV-2 N GENE NPH QL NAA+PROBE: NOT DETECTED

## 2022-07-12 ENCOUNTER — RESULT REVIEW (OUTPATIENT)
Age: 22
End: 2022-07-12

## 2022-07-12 ENCOUNTER — INPATIENT (INPATIENT)
Age: 22
LOS: 2 days | Discharge: HOME | End: 2022-07-15
Attending: STUDENT IN AN ORGANIZED HEALTH CARE EDUCATION/TRAINING PROGRAM | Admitting: STUDENT IN AN ORGANIZED HEALTH CARE EDUCATION/TRAINING PROGRAM

## 2022-07-12 VITALS
WEIGHT: 98.99 LBS | RESPIRATION RATE: 16 BRPM | TEMPERATURE: 98 F | SYSTOLIC BLOOD PRESSURE: 104 MMHG | HEART RATE: 98 BPM | DIASTOLIC BLOOD PRESSURE: 65 MMHG | HEIGHT: 61.5 IN | OXYGEN SATURATION: 99 %

## 2022-07-12 DIAGNOSIS — Z78.9 OTHER SPECIFIED HEALTH STATUS: Chronic | ICD-10-CM

## 2022-07-12 DIAGNOSIS — R19.00 INTRA-ABDOMINAL AND PELVIC SWELLING, MASS AND LUMP, UNSPECIFIED SITE: ICD-10-CM

## 2022-07-12 DIAGNOSIS — Z98.890 OTHER SPECIFIED POSTPROCEDURAL STATES: Chronic | ICD-10-CM

## 2022-07-12 DIAGNOSIS — D36.10 BENIGN NEOPLASM OF PERIPHERAL NERVES AND AUTONOMIC NERVOUS SYSTEM, UNSPECIFIED: Chronic | ICD-10-CM

## 2022-07-12 LAB
ANION GAP SERPL CALC-SCNC: 12 MMOL/L — SIGNIFICANT CHANGE UP (ref 7–14)
BUN SERPL-MCNC: 20 MG/DL — SIGNIFICANT CHANGE UP (ref 7–23)
CALCIUM SERPL-MCNC: 8.3 MG/DL — LOW (ref 8.4–10.5)
CHLORIDE SERPL-SCNC: 105 MMOL/L — SIGNIFICANT CHANGE UP (ref 98–107)
CO2 SERPL-SCNC: 19 MMOL/L — LOW (ref 22–31)
CREAT SERPL-MCNC: 0.74 MG/DL — SIGNIFICANT CHANGE UP (ref 0.5–1.3)
EGFR: 116 ML/MIN/1.73M2 — SIGNIFICANT CHANGE UP
GLUCOSE SERPL-MCNC: 118 MG/DL — HIGH (ref 70–99)
HCG UR QL: NEGATIVE — SIGNIFICANT CHANGE UP
HCT VFR BLD CALC: 28.5 % — LOW (ref 34.5–45)
HGB BLD-MCNC: 9.4 G/DL — LOW (ref 11.5–15.5)
MAGNESIUM SERPL-MCNC: 1.7 MG/DL — SIGNIFICANT CHANGE UP (ref 1.6–2.6)
MCHC RBC-ENTMCNC: 33 GM/DL — SIGNIFICANT CHANGE UP (ref 32–36)
MCHC RBC-ENTMCNC: 33.1 PG — SIGNIFICANT CHANGE UP (ref 27–34)
MCV RBC AUTO: 100.4 FL — HIGH (ref 80–100)
NRBC # BLD: 0 /100 WBCS — SIGNIFICANT CHANGE UP
NRBC # FLD: 0 K/UL — SIGNIFICANT CHANGE UP
PHOSPHATE SERPL-MCNC: 2.5 MG/DL — SIGNIFICANT CHANGE UP (ref 2.5–4.5)
PLATELET # BLD AUTO: 161 K/UL — SIGNIFICANT CHANGE UP (ref 150–400)
POTASSIUM SERPL-MCNC: 4.1 MMOL/L — SIGNIFICANT CHANGE UP (ref 3.5–5.3)
POTASSIUM SERPL-SCNC: 4.1 MMOL/L — SIGNIFICANT CHANGE UP (ref 3.5–5.3)
RBC # BLD: 2.84 M/UL — LOW (ref 3.8–5.2)
RBC # FLD: 11.9 % — SIGNIFICANT CHANGE UP (ref 10.3–14.5)
SODIUM SERPL-SCNC: 136 MMOL/L — SIGNIFICANT CHANGE UP (ref 135–145)
WBC # BLD: 13.35 K/UL — HIGH (ref 3.8–10.5)
WBC # FLD AUTO: 13.35 K/UL — HIGH (ref 3.8–10.5)

## 2022-07-12 PROCEDURE — 52005 CYSTO W/URTRL CATHJ: CPT

## 2022-07-12 PROCEDURE — 49205: CPT | Mod: 62

## 2022-07-12 PROCEDURE — 88307 TISSUE EXAM BY PATHOLOGIST: CPT | Mod: 26

## 2022-07-12 PROCEDURE — 88341 IMHCHEM/IMCYTCHM EA ADD ANTB: CPT | Mod: 26,59

## 2022-07-12 PROCEDURE — 88360 TUMOR IMMUNOHISTOCHEM/MANUAL: CPT | Mod: 26

## 2022-07-12 PROCEDURE — 88342 IMHCHEM/IMCYTCHM 1ST ANTB: CPT | Mod: 26,59

## 2022-07-12 RX ORDER — DEXTROSE MONOHYDRATE, SODIUM CHLORIDE, AND POTASSIUM CHLORIDE 50; .745; 4.5 G/1000ML; G/1000ML; G/1000ML
1000 INJECTION, SOLUTION INTRAVENOUS
Refills: 0 | Status: DISCONTINUED | OUTPATIENT
Start: 2022-07-12 | End: 2022-07-12

## 2022-07-12 RX ORDER — SODIUM CHLORIDE 9 MG/ML
450 INJECTION INTRAMUSCULAR; INTRAVENOUS; SUBCUTANEOUS ONCE
Refills: 0 | Status: COMPLETED | OUTPATIENT
Start: 2022-07-12 | End: 2022-07-12

## 2022-07-12 RX ORDER — SODIUM CHLORIDE 9 MG/ML
1000 INJECTION, SOLUTION INTRAVENOUS
Refills: 0 | Status: DISCONTINUED | OUTPATIENT
Start: 2022-07-12 | End: 2022-07-12

## 2022-07-12 RX ORDER — SODIUM CHLORIDE 9 MG/ML
1000 INJECTION, SOLUTION INTRAVENOUS
Refills: 0 | Status: DISCONTINUED | OUTPATIENT
Start: 2022-07-12 | End: 2022-07-14

## 2022-07-12 RX ORDER — SODIUM CHLORIDE 9 MG/ML
20 INJECTION INTRAMUSCULAR; INTRAVENOUS; SUBCUTANEOUS ONCE
Refills: 0 | Status: DISCONTINUED | OUTPATIENT
Start: 2022-07-12 | End: 2022-07-12

## 2022-07-12 RX ORDER — ONDANSETRON 8 MG/1
4 TABLET, FILM COATED ORAL EVERY 8 HOURS
Refills: 0 | Status: DISCONTINUED | OUTPATIENT
Start: 2022-07-12 | End: 2022-07-15

## 2022-07-12 RX ORDER — NALOXONE HYDROCHLORIDE 4 MG/.1ML
0.1 SPRAY NASAL
Refills: 0 | Status: DISCONTINUED | OUTPATIENT
Start: 2022-07-12 | End: 2022-07-15

## 2022-07-12 RX ORDER — DEXAMETHASONE 0.5 MG/5ML
4 ELIXIR ORAL EVERY 6 HOURS
Refills: 0 | Status: DISCONTINUED | OUTPATIENT
Start: 2022-07-12 | End: 2022-07-15

## 2022-07-12 RX ORDER — SODIUM CHLORIDE 9 MG/ML
449 INJECTION INTRAMUSCULAR; INTRAVENOUS; SUBCUTANEOUS ONCE
Refills: 0 | Status: COMPLETED | OUTPATIENT
Start: 2022-07-12 | End: 2022-07-12

## 2022-07-12 RX ORDER — SODIUM CHLORIDE 9 MG/ML
500 INJECTION, SOLUTION INTRAVENOUS ONCE
Refills: 0 | Status: COMPLETED | OUTPATIENT
Start: 2022-07-12 | End: 2022-07-12

## 2022-07-12 RX ORDER — ONDANSETRON 8 MG/1
4 TABLET, FILM COATED ORAL ONCE
Refills: 0 | Status: DISCONTINUED | OUTPATIENT
Start: 2022-07-12 | End: 2022-07-12

## 2022-07-12 RX ORDER — HYDROMORPHONE HYDROCHLORIDE 2 MG/ML
30 INJECTION INTRAMUSCULAR; INTRAVENOUS; SUBCUTANEOUS
Refills: 0 | Status: DISCONTINUED | OUTPATIENT
Start: 2022-07-12 | End: 2022-07-13

## 2022-07-12 RX ORDER — HYDROMORPHONE HYDROCHLORIDE 2 MG/ML
0.3 INJECTION INTRAMUSCULAR; INTRAVENOUS; SUBCUTANEOUS
Refills: 0 | Status: DISCONTINUED | OUTPATIENT
Start: 2022-07-12 | End: 2022-07-14

## 2022-07-12 RX ORDER — HYDROMORPHONE HYDROCHLORIDE 2 MG/ML
0.5 INJECTION INTRAMUSCULAR; INTRAVENOUS; SUBCUTANEOUS
Refills: 0 | Status: DISCONTINUED | OUTPATIENT
Start: 2022-07-12 | End: 2022-07-12

## 2022-07-12 RX ORDER — ACETAMINOPHEN 500 MG
675 TABLET ORAL EVERY 6 HOURS
Refills: 0 | Status: DISCONTINUED | OUTPATIENT
Start: 2022-07-12 | End: 2022-07-13

## 2022-07-12 RX ADMIN — SODIUM CHLORIDE 85 MILLILITER(S): 9 INJECTION, SOLUTION INTRAVENOUS at 22:16

## 2022-07-12 RX ADMIN — HYDROMORPHONE HYDROCHLORIDE 30 MILLILITER(S): 2 INJECTION INTRAMUSCULAR; INTRAVENOUS; SUBCUTANEOUS at 16:36

## 2022-07-12 RX ADMIN — SODIUM CHLORIDE 20 MILLILITER(S): 9 INJECTION, SOLUTION INTRAVENOUS at 22:16

## 2022-07-12 RX ADMIN — HYDROMORPHONE HYDROCHLORIDE 0.5 MILLIGRAM(S): 2 INJECTION INTRAMUSCULAR; INTRAVENOUS; SUBCUTANEOUS at 13:20

## 2022-07-12 RX ADMIN — Medication 270 MILLIGRAM(S): at 23:51

## 2022-07-12 RX ADMIN — HYDROMORPHONE HYDROCHLORIDE 30 MILLILITER(S): 2 INJECTION INTRAMUSCULAR; INTRAVENOUS; SUBCUTANEOUS at 19:44

## 2022-07-12 RX ADMIN — Medication 675 MILLIGRAM(S): at 18:45

## 2022-07-12 RX ADMIN — SODIUM CHLORIDE 449 MILLILITER(S): 9 INJECTION INTRAMUSCULAR; INTRAVENOUS; SUBCUTANEOUS at 18:52

## 2022-07-12 RX ADMIN — SODIUM CHLORIDE 450 MILLILITER(S): 9 INJECTION INTRAMUSCULAR; INTRAVENOUS; SUBCUTANEOUS at 14:36

## 2022-07-12 RX ADMIN — HYDROMORPHONE HYDROCHLORIDE 30 MILLILITER(S): 2 INJECTION INTRAMUSCULAR; INTRAVENOUS; SUBCUTANEOUS at 13:57

## 2022-07-12 RX ADMIN — SODIUM CHLORIDE 1000 MILLILITER(S): 9 INJECTION, SOLUTION INTRAVENOUS at 12:45

## 2022-07-12 RX ADMIN — Medication 270 MILLIGRAM(S): at 18:15

## 2022-07-12 NOTE — BRIEF OPERATIVE NOTE - NSICDXBRIEFPROCEDURE_GEN_ALL_CORE_FT
PROCEDURES:  Laparotomy, exploratory, with retroperitoneal mass excision 12-Jul-2022 12:34:13  Kamaljit Marr

## 2022-07-12 NOTE — CHART NOTE - NSCHARTNOTEFT_GEN_A_CORE
POST-OP NOTE    JAYLIN PARADA | [id number] | [facility] [address]    Procedure: s/p ex lap with retroperitoneal mass excision    Subjective:    Vital Signs Last 24 Hrs  T(C): 36.8 (12 Jul 2022 18:18), Max: 36.9 (12 Jul 2022 12:30)  T(F): 98.2 (12 Jul 2022 18:18), Max: 98.4 (12 Jul 2022 12:30)  HR: 104 (12 Jul 2022 18:18) (66 - 104)  BP: 111/65 (12 Jul 2022 18:18) (104/65 - 125/65)  BP(mean): 80 (12 Jul 2022 18:18) (75 - 81)  RR: 18 (12 Jul 2022 18:18) (7 - 18)  SpO2: 98% (12 Jul 2022 18:18) (94% - 100%)    Parameters below as of 12 Jul 2022 18:18  Patient On (Oxygen Delivery Method): room air      I&O's Summary    12 Jul 2022 07:01  -  12 Jul 2022 20:49  --------------------------------------------------------  IN: 1515 mL / OUT: 210 mL / NET: 1305 mL                            9.4    13.35 )-----------( 161      ( 12 Jul 2022 15:30 )             28.5     07-12    136  |  105  |  20  ----------------------------<  118<H>  4.1   |  19<L>  |  0.74    Ca    8.3<L>      12 Jul 2022 13:25  Phos  2.5     07-12  Mg     1.70     07-12         PHYSICAL EXAM:  Gen: no obvious distress, resting in bed.  Resp: breathing easily, no stridor  CV: RRR  Abdomen: soft, tender to palpation in LLQ, mildly distended  Skin: Incision c/d/i, raised . Normal color, no rashes or lesions

## 2022-07-12 NOTE — PATIENT PROFILE PEDIATRIC - SBIRT ADOLESCENCE UNABLE TO BE SCREENED
Chart reviewed.   Immunizations: Triggered Imm Registry     Orders placed: n/a  Upcoming appts to satisfy MONIKA topics: n/a        
Patient over 18

## 2022-07-12 NOTE — BRIEF OPERATIVE NOTE - OPERATION/FINDINGS
11cm left retroperitoneal mass resected    Cystoscopy with left ureteral stent placed by Dr Yon Quezada at start of case

## 2022-07-13 RX ORDER — LIDOCAINE 4 G/100G
1 CREAM TOPICAL EVERY 24 HOURS
Refills: 0 | Status: DISCONTINUED | OUTPATIENT
Start: 2022-07-13 | End: 2022-07-15

## 2022-07-13 RX ORDER — HYDROMORPHONE HYDROCHLORIDE 2 MG/ML
30 INJECTION INTRAMUSCULAR; INTRAVENOUS; SUBCUTANEOUS
Refills: 0 | Status: DISCONTINUED | OUTPATIENT
Start: 2022-07-13 | End: 2022-07-14

## 2022-07-13 RX ORDER — ACETAMINOPHEN 500 MG
650 TABLET ORAL EVERY 6 HOURS
Refills: 0 | Status: COMPLETED | OUTPATIENT
Start: 2022-07-13 | End: 2022-07-14

## 2022-07-13 RX ORDER — GABAPENTIN 400 MG/1
300 CAPSULE ORAL THREE TIMES A DAY
Refills: 0 | Status: DISCONTINUED | OUTPATIENT
Start: 2022-07-13 | End: 2022-07-15

## 2022-07-13 RX ORDER — SODIUM CHLORIDE 9 MG/ML
450 INJECTION INTRAMUSCULAR; INTRAVENOUS; SUBCUTANEOUS ONCE
Refills: 0 | Status: COMPLETED | OUTPATIENT
Start: 2022-07-13 | End: 2022-07-13

## 2022-07-13 RX ADMIN — Medication 270 MILLIGRAM(S): at 06:10

## 2022-07-13 RX ADMIN — HYDROMORPHONE HYDROCHLORIDE 30 MILLILITER(S): 2 INJECTION INTRAMUSCULAR; INTRAVENOUS; SUBCUTANEOUS at 07:30

## 2022-07-13 RX ADMIN — HYDROMORPHONE HYDROCHLORIDE 30 MILLILITER(S): 2 INJECTION INTRAMUSCULAR; INTRAVENOUS; SUBCUTANEOUS at 11:16

## 2022-07-13 RX ADMIN — SODIUM CHLORIDE 85 MILLILITER(S): 9 INJECTION, SOLUTION INTRAVENOUS at 19:13

## 2022-07-13 RX ADMIN — LIDOCAINE 1 PATCH: 4 CREAM TOPICAL at 19:11

## 2022-07-13 RX ADMIN — GABAPENTIN 300 MILLIGRAM(S): 400 CAPSULE ORAL at 15:31

## 2022-07-13 RX ADMIN — Medication 650 MILLIGRAM(S): at 19:09

## 2022-07-13 RX ADMIN — Medication 675 MILLIGRAM(S): at 00:15

## 2022-07-13 RX ADMIN — GABAPENTIN 300 MILLIGRAM(S): 400 CAPSULE ORAL at 18:47

## 2022-07-13 RX ADMIN — ONDANSETRON 8 MILLIGRAM(S): 8 TABLET, FILM COATED ORAL at 16:30

## 2022-07-13 RX ADMIN — SODIUM CHLORIDE 20 MILLILITER(S): 9 INJECTION, SOLUTION INTRAVENOUS at 19:14

## 2022-07-13 RX ADMIN — SODIUM CHLORIDE 450 MILLILITER(S): 9 INJECTION INTRAMUSCULAR; INTRAVENOUS; SUBCUTANEOUS at 17:25

## 2022-07-13 RX ADMIN — SODIUM CHLORIDE 20 MILLILITER(S): 9 INJECTION, SOLUTION INTRAVENOUS at 07:26

## 2022-07-13 RX ADMIN — GABAPENTIN 300 MILLIGRAM(S): 400 CAPSULE ORAL at 10:26

## 2022-07-13 RX ADMIN — Medication 260 MILLIGRAM(S): at 18:47

## 2022-07-13 RX ADMIN — HYDROMORPHONE HYDROCHLORIDE 30 MILLILITER(S): 2 INJECTION INTRAMUSCULAR; INTRAVENOUS; SUBCUTANEOUS at 19:19

## 2022-07-13 RX ADMIN — Medication 675 MILLIGRAM(S): at 06:21

## 2022-07-13 RX ADMIN — Medication 260 MILLIGRAM(S): at 23:51

## 2022-07-13 RX ADMIN — SODIUM CHLORIDE 85 MILLILITER(S): 9 INJECTION, SOLUTION INTRAVENOUS at 07:25

## 2022-07-13 RX ADMIN — LIDOCAINE 1 PATCH: 4 CREAM TOPICAL at 12:22

## 2022-07-13 RX ADMIN — Medication 260 MILLIGRAM(S): at 12:22

## 2022-07-13 NOTE — PROGRESS NOTE PEDS - SUBJECTIVE AND OBJECTIVE BOX
PEDIATRIC SURGERY DAILY PROGRESS NOTE:     Interval events: No acute events overnight.  Subjective: Patient seen and examined at bedside. Patient reports no severe pain with PCA active and has drank some Gatorade overnight.    Objective:    Vital Signs Last 24 Hrs  T(C): 36.9 (13 Jul 2022 06:55), Max: 37.1 (13 Jul 2022 02:14)  T(F): 98.4 (13 Jul 2022 06:55), Max: 98.7 (13 Jul 2022 02:14)  HR: 82 (13 Jul 2022 06:55) (66 - 104)  BP: 103/65 (13 Jul 2022 06:55) (99/63 - 125/65)  BP(mean): 75 (13 Jul 2022 02:14) (75 - 88)  RR: 18 (13 Jul 2022 06:55) (7 - 18)  SpO2: 99% (13 Jul 2022 06:55) (94% - 100%)    Physical Exam:  Gen: no obvious distress, resting in bed.  Resp: breathing easily  CV: RRR  Abdomen: soft, tender to palpation in LLQ, mildly distended  Skin: Incision c/d/i, raised . Normal color, no rashes or lesions.  : Duvall in place. Draining clear urine this AM.    Parameters below as of 13 Jul 2022 06:55  Patient On (Oxygen Delivery Method): room air    I&O's Detail    12 Jul 2022 07:01  -  13 Jul 2022 07:00  --------------------------------------------------------  IN:    dextrose 5% + sodium chloride 0.9% + potassium chloride 20 mEq/L - Pediatric: 445 mL    dextrose 5% + sodium chloride 0.9% - Pediatric: 935 mL    IV PiggyBack: 120 mL    Lactated Ringers Bolus - Pediatric: 500 mL    Oral Fluid: 285 mL    sodium chloride 0.9% - Pediatric: 240 mL    Sodium Chloride 0.9% Bolus - Pediatric: 920 mL  Total IN: 3445 mL    OUT:    Indwelling Catheter - Urethral (mL): 1080 mL  Total OUT: 1080 mL    Total NET: 2365 mL      LABS:                        9.4    13.35 )-----------( 161      ( 12 Jul 2022 15:30 )             28.5     07-12    136  |  105  |  20  ----------------------------<  118<H>  4.1   |  19<L>  |  0.74    Ca    8.3<L>      12 Jul 2022 13:25  Phos  2.5     07-12  Mg     1.70     07-12      RADIOLOGY & ADDITIONAL STUDIES:    MEDICATIONS  (STANDING):  acetaminophen   IV Intermittent - Peds. 675 milliGRAM(s) IV Intermittent every 6 hours  dextrose 5% + sodium chloride 0.9%. - Pediatric 1000 milliLiter(s) (85 mL/Hr) IV Continuous <Continuous>  HYDROmorphone PCA (1 mG/mL) - Peds 30 milliLiter(s) PCA Continuous PCA Continuous  sodium chloride 0.9%. - Pediatric 1000 milliLiter(s) (20 mL/Hr) IV Continuous <Continuous>    MEDICATIONS  (PRN):  dexAMETHasone IV Intermittent - Pediatric 4 milliGRAM(s) IV Intermittent every 6 hours PRN Nausea, IF ondansetron is ineffective after 30 - 60 minutes  HYDROmorphone PCA (1 mG/mL) Rescue Clinician Bolus - Peds 0.3 milliGRAM(s) IV Push every 15 minutes PRN for Pain Score greater than 6  naloxone  IV Push - Peds 0.1 milliGRAM(s) IV Push every 3 minutes PRN For ANY of the following changes in patient status:  A. RR less than 10 breaths/min, B. Oxygen saturation less than 90%, C. Sedation score of 6  ondansetron IV Intermittent - Peds 4 milliGRAM(s) IV Intermittent every 8 hours PRN Nausea

## 2022-07-13 NOTE — PHYSICAL THERAPY INITIAL EVALUATION ADULT - MODALITIES TREATMENT COMMENTS
Pt left seated upright in bed, all lines intact, MOC present, in NAD. Pt with good understanding of therex and incentive spirometer use. CBIR, PCA in reach. RN aware of eval outcome. OOB activity encouraged.

## 2022-07-13 NOTE — PROGRESS NOTE PEDS - SUBJECTIVE AND OBJECTIVE BOX
Anesthesia Pain Management Service    SUBJECTIVE: Pt doing well with IV PCA without problems reported.    Therapy:	  [ X] IV PCA	   [ ] Epidural           [ ] s/p Spinal Opoid              [ ] Postpartum infusion	  [ ] Patient controlled regional anesthesia (PCRA)    [ ] prn Analgesics    Allergies    albuterol (Rash)        MEDICATIONS  (STANDING):  acetaminophen   IV Intermittent - Peds. 650 milliGRAM(s) IV Intermittent every 6 hours  dextrose 5% + sodium chloride 0.9%. - Pediatric 1000 milliLiter(s) (85 mL/Hr) IV Continuous <Continuous>  gabapentin Oral Tab/Cap - Peds 300 milliGRAM(s) Oral three times a day  HYDROmorphone PCA (1 mG/mL) - Peds 30 milliLiter(s) PCA Continuous PCA Continuous  lidocaine 4% Transdermal Patch - Peds 1 Patch Transdermal every 24 hours  sodium chloride 0.9%. - Pediatric 1000 milliLiter(s) (20 mL/Hr) IV Continuous <Continuous>    MEDICATIONS  (PRN):  dexAMETHasone IV Intermittent - Pediatric 4 milliGRAM(s) IV Intermittent every 6 hours PRN Nausea, IF ondansetron is ineffective after 30 - 60 minutes  HYDROmorphone PCA (1 mG/mL) Rescue Clinician Bolus - Peds 0.3 milliGRAM(s) IV Push every 15 minutes PRN for Pain Score greater than 6  naloxone  IV Push - Peds 0.1 milliGRAM(s) IV Push every 3 minutes PRN For ANY of the following changes in patient status:  A. RR less than 10 breaths/min, B. Oxygen saturation less than 90%, C. Sedation score of 6  ondansetron IV Intermittent - Peds 4 milliGRAM(s) IV Intermittent every 8 hours PRN Nausea      OBJECTIVE:   [X] No new signs     [ ] Other:    Side Effects:  [X ] None			[ ] Other:    Assessment of Catheter Site:		[ ] Intact		[ ] Other:    ASSESSMENT/PLAN  [ X] Continue current therapy. Recommend non-opioid adjuvant analgesics to be used when possible and when allowed by primary surgical team.    [ ] Therapy changed to:    [ ] IV PCA       [ ] Epidural     [ ] prn Analgesics     Comments:    Progress Note written now but Patient was seen earlier.

## 2022-07-13 NOTE — PROGRESS NOTE PEDS - ASSESSMENT
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 Patient was taken to the OR on 7/12 for L RP mass excision. Moved to floor with improvement in urine output and color. Pain controlled with IV meds and PCA    Plan:  - Will remove cuellar and stent today (7/13) as the urine has cleared  - Regular Diet as tolerated  - Encourage OOB and ambulation  - Continue 1x fluids  - NO NSAIDS

## 2022-07-13 NOTE — PHYSICAL THERAPY INITIAL EVALUATION ADULT - MANUAL MUSCLE TESTING RESULTS, REHAB EVAL
pain/surgery; at least 4/5 in b/l UEs and 5/5 in L LE; R LE 3/5 DF, 3+/5 knee extension and hip flexion./grossly assessed due to

## 2022-07-13 NOTE — CHART NOTE - NSCHARTNOTEFT_GEN_A_CORE
Cuellar with stent removed at bedside. Patient told to exhale as cuellar with stent was removed. Entire cuellar and stent was visualized after removal to ensure proper removal with no retention. Patient expressed mild discomfort after cuellar with stent was removed. Patient was informed that if she continues to feel discomfort over the day or has difficulties with urination, to let the nurse or team know. Will follow up later today to assess voiding status.

## 2022-07-13 NOTE — PHYSICAL THERAPY INITIAL EVALUATION ADULT - ADDITIONAL COMMENTS
Pt was previously independent with all activities. She  had nerve damage in previous surgery leading to some foot drop and genu recurvatum for ~ 2 years. She has braces that she wears and a cane at home she used to use but has not used in a while. She does not feel limited by it. She had PT in the past for nerve injury.

## 2022-07-13 NOTE — PHYSICAL THERAPY INITIAL EVALUATION ADULT - GAIT DEVIATIONS NOTED, PT EVAL
R LE foot flat, dec DF; genu recurvatum/decreased maranda/footdrop/decreased step length/decreased stride length/decreased weight-shifting ability

## 2022-07-13 NOTE — PHYSICAL THERAPY INITIAL EVALUATION ADULT - PERTINENT HX OF CURRENT PROBLEM, REHAB EVAL
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 Patient was taken to the OR on 7/12 for L RP mass excision.

## 2022-07-13 NOTE — PROGRESS NOTE PEDS - SUBJECTIVE AND OBJECTIVE BOX
Anesthesia Pain Management Service    SUBJECTIVE: Patient reports pain with IV Dilaudid PCA, stating initially it helps, but pain relief does not last.    Pain Scale Score	At rest: _8/10__ 	With Activity: _9/10__ 	[X ] Refer to charted pain scores    THERAPY:    [ ] IV PCA Morphine		[ ] 5 mg/mL	[ ] 1 mg/mL  [X ] IV PCA Hydromorphone	[ ] 5 mg/mL	[X ] 1 mg/mL  [ ] IV PCA Fentanyl		[ ] 50 micrograms/mL    Demand dose __0.2_ lockout __6_ (minutes) Continuous Rate _0__ Total: _3.4__  mg used (in past 24 hours)      MEDICATIONS  (STANDING):  acetaminophen   IV Intermittent - Peds. 650 milliGRAM(s) IV Intermittent every 6 hours  dextrose 5% + sodium chloride 0.9%. - Pediatric 1000 milliLiter(s) (85 mL/Hr) IV Continuous <Continuous>  gabapentin Oral Tab/Cap - Peds 300 milliGRAM(s) Oral three times a day  HYDROmorphone PCA (1 mG/mL) - Peds 30 milliLiter(s) PCA Continuous PCA Continuous  lidocaine 4% Transdermal Patch - Peds 1 Patch Transdermal every 24 hours  sodium chloride 0.9%. - Pediatric 1000 milliLiter(s) (20 mL/Hr) IV Continuous <Continuous>    MEDICATIONS  (PRN):  dexAMETHasone IV Intermittent - Pediatric 4 milliGRAM(s) IV Intermittent every 6 hours PRN Nausea, IF ondansetron is ineffective after 30 - 60 minutes  HYDROmorphone PCA (1 mG/mL) Rescue Clinician Bolus - Peds 0.3 milliGRAM(s) IV Push every 15 minutes PRN for Pain Score greater than 6  naloxone  IV Push - Peds 0.1 milliGRAM(s) IV Push every 3 minutes PRN For ANY of the following changes in patient status:  A. RR less than 10 breaths/min, B. Oxygen saturation less than 90%, C. Sedation score of 6  ondansetron IV Intermittent - Peds 4 milliGRAM(s) IV Intermittent every 8 hours PRN Nausea      OBJECTIVE:  Patient ambulated to the bathroom and back to bed.     Sedation Score:	[ X] Alert	 [ ] Drowsy 	[ ] Arousable	[ ] Asleep	[ ] Unresponsive    Side Effects:	[X ] None	[ ] Nausea	[ ] Vomiting	[ ] Pruritus  		[ ] Other:    Vital Signs Last 24 Hrs  T(C): 36.9 (13 Jul 2022 06:55), Max: 37.1 (13 Jul 2022 02:14)  T(F): 98.4 (13 Jul 2022 06:55), Max: 98.7 (13 Jul 2022 02:14)  HR: 82 (13 Jul 2022 06:55) (66 - 104)  BP: 103/65 (13 Jul 2022 06:55) (99/63 - 125/65)  BP(mean): 75 (13 Jul 2022 02:14) (75 - 88)  RR: 18 (13 Jul 2022 06:55) (7 - 18)  SpO2: 99% (13 Jul 2022 06:55) (94% - 100%)    Parameters below as of 13 Jul 2022 06:55  Patient On (Oxygen Delivery Method): room air        ASSESSMENT/ PLAN    Therapy to  be:	[ X] Continue   [ ] Discontinued   [ ] Change to prn Analgesics    Documentation and Verification of current medications:   [X] Done	[ ] Not done, not elligible    Comments: Discussed patient with peds surgical team. Will continue with IV Dilaudid PCA. Increased demand dose to 0.25mg and added lidocaine patch to be split on either side of patient's midline incision.  Team added Gabapentin.  Recommend non-opioid adjuvant analgesics to be used when possible and when allowed by primary surgical team.    Progress Note written now but Patient was seen earlier. Anesthesia Pain Management Service    SUBJECTIVE: Patient reports pain with IV Dilaudid PCA, stating initially it helps, but pain relief does not last.    Pain Scale Score	At rest: _8/10__ 	With Activity: _9/10__ 	[X ] Refer to charted pain scores    THERAPY:    [ ] IV PCA Morphine		[ ] 5 mg/mL	[ ] 1 mg/mL  [X ] IV PCA Hydromorphone	[ ] 5 mg/mL	[X ] 1 mg/mL  [ ] IV PCA Fentanyl		[ ] 50 micrograms/mL    Demand dose __0.2_ lockout __6_ (minutes) Continuous Rate _0__ Total: _3.4__  mg used (in past 24 hours)      MEDICATIONS  (STANDING):  acetaminophen   IV Intermittent - Peds. 650 milliGRAM(s) IV Intermittent every 6 hours  dextrose 5% + sodium chloride 0.9%. - Pediatric 1000 milliLiter(s) (85 mL/Hr) IV Continuous <Continuous>  gabapentin Oral Tab/Cap - Peds 300 milliGRAM(s) Oral three times a day  HYDROmorphone PCA (1 mG/mL) - Peds 30 milliLiter(s) PCA Continuous PCA Continuous  lidocaine 4% Transdermal Patch - Peds 1 Patch Transdermal every 24 hours  sodium chloride 0.9%. - Pediatric 1000 milliLiter(s) (20 mL/Hr) IV Continuous <Continuous>    MEDICATIONS  (PRN):  dexAMETHasone IV Intermittent - Pediatric 4 milliGRAM(s) IV Intermittent every 6 hours PRN Nausea, IF ondansetron is ineffective after 30 - 60 minutes  HYDROmorphone PCA (1 mG/mL) Rescue Clinician Bolus - Peds 0.3 milliGRAM(s) IV Push every 15 minutes PRN for Pain Score greater than 6  naloxone  IV Push - Peds 0.1 milliGRAM(s) IV Push every 3 minutes PRN For ANY of the following changes in patient status:  A. RR less than 10 breaths/min, B. Oxygen saturation less than 90%, C. Sedation score of 6  ondansetron IV Intermittent - Peds 4 milliGRAM(s) IV Intermittent every 8 hours PRN Nausea      OBJECTIVE:  Patient ambulated to the bathroom and back to bed.     Sedation Score:	[ X] Alert	 [ ] Drowsy 	[ ] Arousable	[ ] Asleep	[ ] Unresponsive    Side Effects:	[X ] None	[ ] Nausea	[ ] Vomiting	[ ] Pruritus  		[ ] Other:    Vital Signs Last 24 Hrs  T(C): 36.9 (13 Jul 2022 06:55), Max: 37.1 (13 Jul 2022 02:14)  T(F): 98.4 (13 Jul 2022 06:55), Max: 98.7 (13 Jul 2022 02:14)  HR: 82 (13 Jul 2022 06:55) (66 - 104)  BP: 103/65 (13 Jul 2022 06:55) (99/63 - 125/65)  BP(mean): 75 (13 Jul 2022 02:14) (75 - 88)  RR: 18 (13 Jul 2022 06:55) (7 - 18)  SpO2: 99% (13 Jul 2022 06:55) (94% - 100%)    Parameters below as of 13 Jul 2022 06:55  Patient On (Oxygen Delivery Method): room air        ASSESSMENT/ PLAN    Therapy to  be:	[ X] Continue   [ ] Discontinued   [ ] Change to prn Analgesics    Documentation and Verification of current medications:   [X] Done	[ ] Not done, not elligible    Comments: Discussed patient with peds surgical team. Will continue with IV Dilaudid PCA as patient is still on a clear diet.  Increased demand dose to 0.25mg and added lidocaine patch to be split on either side of patient's midline incision, and IV Tylenol q 6 x 4 doses.  Team added Gabapentin.  Recommend non-opioid adjuvant analgesics to be used when possible and when allowed by primary surgical team.    Progress Note written now but Patient was seen earlier.

## 2022-07-14 ENCOUNTER — TRANSCRIPTION ENCOUNTER (OUTPATIENT)
Age: 22
End: 2022-07-14

## 2022-07-14 RX ORDER — SODIUM CHLORIDE 9 MG/ML
3 INJECTION INTRAMUSCULAR; INTRAVENOUS; SUBCUTANEOUS ONCE
Refills: 0 | Status: COMPLETED | OUTPATIENT
Start: 2022-07-14 | End: 2022-07-14

## 2022-07-14 RX ORDER — OXYCODONE HYDROCHLORIDE 5 MG/1
4.5 TABLET ORAL EVERY 4 HOURS
Refills: 0 | Status: DISCONTINUED | OUTPATIENT
Start: 2022-07-14 | End: 2022-07-14

## 2022-07-14 RX ORDER — ACETAMINOPHEN 500 MG
650 TABLET ORAL EVERY 6 HOURS
Refills: 0 | Status: DISCONTINUED | OUTPATIENT
Start: 2022-07-14 | End: 2022-07-15

## 2022-07-14 RX ORDER — OXYCODONE HYDROCHLORIDE 5 MG/1
5 TABLET ORAL EVERY 4 HOURS
Refills: 0 | Status: DISCONTINUED | OUTPATIENT
Start: 2022-07-14 | End: 2022-07-15

## 2022-07-14 RX ORDER — MORPHINE SULFATE 50 MG/1
2.2 CAPSULE, EXTENDED RELEASE ORAL EVERY 4 HOURS
Refills: 0 | Status: DISCONTINUED | OUTPATIENT
Start: 2022-07-14 | End: 2022-07-14

## 2022-07-14 RX ADMIN — SODIUM CHLORIDE 85 MILLILITER(S): 9 INJECTION, SOLUTION INTRAVENOUS at 07:45

## 2022-07-14 RX ADMIN — Medication 650 MILLIGRAM(S): at 00:12

## 2022-07-14 RX ADMIN — Medication 650 MILLIGRAM(S): at 17:53

## 2022-07-14 RX ADMIN — LIDOCAINE 1 PATCH: 4 CREAM TOPICAL at 00:34

## 2022-07-14 RX ADMIN — GABAPENTIN 300 MILLIGRAM(S): 400 CAPSULE ORAL at 21:25

## 2022-07-14 RX ADMIN — HYDROMORPHONE HYDROCHLORIDE 30 MILLILITER(S): 2 INJECTION INTRAMUSCULAR; INTRAVENOUS; SUBCUTANEOUS at 07:51

## 2022-07-14 RX ADMIN — SODIUM CHLORIDE 3 MILLILITER(S): 9 INJECTION INTRAMUSCULAR; INTRAVENOUS; SUBCUTANEOUS at 12:00

## 2022-07-14 RX ADMIN — OXYCODONE HYDROCHLORIDE 5 MILLIGRAM(S): 5 TABLET ORAL at 21:24

## 2022-07-14 RX ADMIN — SODIUM CHLORIDE 20 MILLILITER(S): 9 INJECTION, SOLUTION INTRAVENOUS at 07:46

## 2022-07-14 RX ADMIN — MORPHINE SULFATE 2.2 MILLIGRAM(S): 50 CAPSULE, EXTENDED RELEASE ORAL at 09:32

## 2022-07-14 RX ADMIN — ONDANSETRON 8 MILLIGRAM(S): 8 TABLET, FILM COATED ORAL at 08:31

## 2022-07-14 RX ADMIN — Medication 650 MILLIGRAM(S): at 06:45

## 2022-07-14 RX ADMIN — Medication 650 MILLIGRAM(S): at 12:17

## 2022-07-14 RX ADMIN — LIDOCAINE 1 PATCH: 4 CREAM TOPICAL at 12:17

## 2022-07-14 RX ADMIN — Medication 260 MILLIGRAM(S): at 06:00

## 2022-07-14 RX ADMIN — OXYCODONE HYDROCHLORIDE 5 MILLIGRAM(S): 5 TABLET ORAL at 16:30

## 2022-07-14 RX ADMIN — GABAPENTIN 300 MILLIGRAM(S): 400 CAPSULE ORAL at 14:59

## 2022-07-14 RX ADMIN — Medication 650 MILLIGRAM(S): at 23:32

## 2022-07-14 RX ADMIN — GABAPENTIN 300 MILLIGRAM(S): 400 CAPSULE ORAL at 08:24

## 2022-07-14 NOTE — PROGRESS NOTE PEDS - ASSESSMENT
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 Patient was taken to the OR on 7/12 for L RP mass excision. Moved to floor with improvement in urine output and color. 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 Patient was taken to the OR on 7/12 for L RP mass excision. Moved to floor with improvement in urine output and color. Pain controlled with IV meds     Plan:  - Pain control, d/c PCA  - Regular Diet   - Encourage OOB and ambulation  - Saline lock- good PO intake  - NO NSAIDS

## 2022-07-14 NOTE — DISCHARGE NOTE PROVIDER - NSDCCPCAREPLAN_GEN_ALL_CORE_FT
PRINCIPAL DISCHARGE DIAGNOSIS  Diagnosis: S/P exploratory laparotomy  Assessment and Plan of Treatment:       SECONDARY DISCHARGE DIAGNOSES  Diagnosis: NF (neurofibromatosis)  Assessment and Plan of Treatment:

## 2022-07-14 NOTE — DISCHARGE NOTE PROVIDER - CARE PROVIDER_API CALL
Kamaljit Marr)  Pediatric Surgery; Surgery  [address] [facility], [address]  [place] [address]  Phone: [phone]  Fax: [phone]  Follow Up Time:

## 2022-07-14 NOTE — PROGRESS NOTE ADULT - SUBJECTIVE AND OBJECTIVE BOX
Anesthesia Pain Management Service    SUBJECTIVE: Patient now OFF IV PCA.    Pain Scale Score	At rest: _5/10_ 	With Activity: ___ 	[X ] Refer to charted pain scores    THERAPY:    [ ] IV PCA Morphine		[ ] 5 mg/mL	[ ] 1 mg/mL  [X ] IV PCA Hydromorphone	[ ] 5 mg/mL	[X ] 1 mg/mL  [ ] IV PCA Fentanyl		[ ] 50 micrograms/mL    Demand dose __0.2_ lockout __6_ (minutes) Continuous Rate _0__ Total: _6.5__   mg used (in past 24 hrs)      MEDICATIONS  (STANDING):  acetaminophen   Oral Tab/Cap - Peds. 650 milliGRAM(s) Oral every 6 hours  gabapentin Oral Tab/Cap - Peds 300 milliGRAM(s) Oral three times a day  lidocaine 4% Transdermal Patch - Peds 1 Patch Transdermal every 24 hours  sodium chloride 0.9% lock flush - Peds 3 milliLiter(s) IV Push once    MEDICATIONS  (PRN):  dexAMETHasone IV Intermittent - Pediatric 4 milliGRAM(s) IV Intermittent every 6 hours PRN Nausea, IF ondansetron is ineffective after 30 - 60 minutes  morphine  IV  Push - Peds 2.2 milliGRAM(s) IV Push every 4 hours PRN Moderate Pain (4 - 6)  naloxone  IV Push - Peds 0.1 milliGRAM(s) IV Push every 3 minutes PRN For ANY of the following changes in patient status:  A. RR less than 10 breaths/min, B. Oxygen saturation less than 90%, C. Sedation score of 6  ondansetron IV Intermittent - Peds 4 milliGRAM(s) IV Intermittent every 8 hours PRN Nausea      OBJECTIVE:    Sedation Score:	[ X] Alert	[ ] Drowsy 	[ ] Arousable	[ ] Asleep	[ ] Unresponsive    Side Effects:	[X ] None	[ ] Nausea	[ ] Vomiting	[ ] Pruritus  		[ ] Other:    Vital Signs Last 24 Hrs  T(C): 37 (14 Jul 2022 08:55), Max: 37 (13 Jul 2022 22:52)  T(F): 98.6 (14 Jul 2022 08:55), Max: 98.6 (13 Jul 2022 22:52)  HR: 105 (14 Jul 2022 08:55) (90 - 105)  BP: 109/71 (14 Jul 2022 08:55) (96/64 - 110/66)  BP(mean): --  RR: 18 (14 Jul 2022 08:55) (18 - 19)  SpO2: 100% (14 Jul 2022 08:55) (98% - 100%)    Parameters below as of 14 Jul 2022 08:55  Patient On (Oxygen Delivery Method): room air        ASSESSMENT/ PLAN    Therapy to  be:	[ ] Continue   [ X] Discontinued   [X ] Change to prn Analgesics    Documentation and Verification of current medications:   [X] Done	[ ] Not done, not elligible    Comments: IV PCA discontinued and PRN Oral/IV opioids and/or Adjuvant non-opioid medication ordered by primary team.    Progress Note written now but Patient was seen earlier.

## 2022-07-14 NOTE — PROGRESS NOTE PEDS - SUBJECTIVE AND OBJECTIVE BOX
PEDIATRIC SURGERY DAILY PROGRESS NOTE:     Interval events: No acute events overnight.  Subjective: Patient seen and examined at bedside. Patient was able to get up and urinate independently.      Objective:    Vital Signs Last 24 Hrs  T(C): 36.5 (14 Jul 2022 02:11), Max: 37 (13 Jul 2022 22:52)  T(F): 97.7 (14 Jul 2022 02:11), Max: 98.6 (13 Jul 2022 22:52)  HR: 93 (14 Jul 2022 02:11) (82 - 101)  BP: 98/60 (14 Jul 2022 02:11) (96/64 - 106/64)  BP(mean): --  RR: 18 (14 Jul 2022 02:11) (18 - 19)  SpO2: 98% (14 Jul 2022 02:11) (97% - 100%)    Parameters below as of 14 Jul 2022 02:11  Patient On (Oxygen Delivery Method): room air        I&O's Detail    12 Jul 2022 07:01  -  13 Jul 2022 07:00  --------------------------------------------------------  IN:    dextrose 5% + sodium chloride 0.9% + potassium chloride 20 mEq/L - Pediatric: 445 mL    dextrose 5% + sodium chloride 0.9% - Pediatric: 935 mL    IV PiggyBack: 120 mL    Lactated Ringers Bolus - Pediatric: 500 mL    Oral Fluid: 285 mL    sodium chloride 0.9% - Pediatric: 240 mL    Sodium Chloride 0.9% Bolus - Pediatric: 920 mL  Total IN: 3445 mL    OUT:    Indwelling Catheter - Urethral (mL): 1080 mL  Total OUT: 1080 mL    Total NET: 2365 mL      13 Jul 2022 07:01  -  14 Jul 2022 03:39  --------------------------------------------------------  IN:    dextrose 5% + sodium chloride 0.9% - Pediatric: 1020 mL    Oral Fluid: 240 mL    sodium chloride 0.9% - Pediatric: 240 mL  Total IN: 1500 mL    OUT:    Indwelling Catheter - Urethral (mL): 300 mL    Voided (mL): 870 mL  Total OUT: 1170 mL    Total NET: 330 mL      LABS:                        9.4    13.35 )-----------( 161      ( 12 Jul 2022 15:30 )             28.5     07-12    136  |  105  |  20  ----------------------------<  118<H>  4.1   |  19<L>  |  0.74    Ca    8.3<L>      12 Jul 2022 13:25  Phos  2.5     07-12  Mg     1.70     07-12      RADIOLOGY & ADDITIONAL STUDIES:    MEDICATIONS  (STANDING):  acetaminophen   IV Intermittent - Peds. 650 milliGRAM(s) IV Intermittent every 6 hours  dextrose 5% + sodium chloride 0.9%. - Pediatric 1000 milliLiter(s) (85 mL/Hr) IV Continuous <Continuous>  gabapentin Oral Tab/Cap - Peds 300 milliGRAM(s) Oral three times a day  HYDROmorphone PCA (1 mG/mL) - Peds 30 milliLiter(s) PCA Continuous PCA Continuous  lidocaine 4% Transdermal Patch - Peds 1 Patch Transdermal every 24 hours  sodium chloride 0.9%. - Pediatric 1000 milliLiter(s) (20 mL/Hr) IV Continuous <Continuous>    MEDICATIONS  (PRN):  dexAMETHasone IV Intermittent - Pediatric 4 milliGRAM(s) IV Intermittent every 6 hours PRN Nausea, IF ondansetron is ineffective after 30 - 60 minutes  HYDROmorphone PCA (1 mG/mL) Rescue Clinician Bolus - Peds 0.3 milliGRAM(s) IV Push every 15 minutes PRN for Pain Score greater than 6  naloxone  IV Push - Peds 0.1 milliGRAM(s) IV Push every 3 minutes PRN For ANY of the following changes in patient status:  A. RR less than 10 breaths/min, B. Oxygen saturation less than 90%, C. Sedation score of 6  ondansetron IV Intermittent - Peds 4 milliGRAM(s) IV Intermittent every 8 hours PRN Nausea               PEDIATRIC SURGERY DAILY PROGRESS NOTE:     Interval events: No acute events overnight.  Subjective: Patient seen and examined at bedside. Patient was able to get up and urinate independently. Pain is well controlled. Good PO intake      Objective:    Vital Signs Last 24 Hrs  T(C): 36.5 (14 Jul 2022 02:11), Max: 37 (13 Jul 2022 22:52)  T(F): 97.7 (14 Jul 2022 02:11), Max: 98.6 (13 Jul 2022 22:52)  HR: 93 (14 Jul 2022 02:11) (82 - 101)  BP: 98/60 (14 Jul 2022 02:11) (96/64 - 106/64)  BP(mean): --  RR: 18 (14 Jul 2022 02:11) (18 - 19)  SpO2: 98% (14 Jul 2022 02:11) (97% - 100%)    Parameters below as of 14 Jul 2022 02:11  Patient On (Oxygen Delivery Method): room air        I&O's Detail    12 Jul 2022 07:01  -  13 Jul 2022 07:00  --------------------------------------------------------  IN:    dextrose 5% + sodium chloride 0.9% + potassium chloride 20 mEq/L - Pediatric: 445 mL    dextrose 5% + sodium chloride 0.9% - Pediatric: 935 mL    IV PiggyBack: 120 mL    Lactated Ringers Bolus - Pediatric: 500 mL    Oral Fluid: 285 mL    sodium chloride 0.9% - Pediatric: 240 mL    Sodium Chloride 0.9% Bolus - Pediatric: 920 mL  Total IN: 3445 mL    OUT:    Indwelling Catheter - Urethral (mL): 1080 mL  Total OUT: 1080 mL    Total NET: 2365 mL      13 Jul 2022 07:01  -  14 Jul 2022 03:39  --------------------------------------------------------  IN:    dextrose 5% + sodium chloride 0.9% - Pediatric: 1020 mL    Oral Fluid: 240 mL    sodium chloride 0.9% - Pediatric: 240 mL  Total IN: 1500 mL    OUT:    Indwelling Catheter - Urethral (mL): 300 mL    Voided (mL): 870 mL  Total OUT: 1170 mL    Total NET: 330 mL      LABS:                        9.4    13.35 )-----------( 161      ( 12 Jul 2022 15:30 )             28.5     07-12    136  |  105  |  20  ----------------------------<  118<H>  4.1   |  19<L>  |  0.74    Ca    8.3<L>      12 Jul 2022 13:25  Phos  2.5     07-12  Mg     1.70     07-12      RADIOLOGY & ADDITIONAL STUDIES:    MEDICATIONS  (STANDING):  acetaminophen   IV Intermittent - Peds. 650 milliGRAM(s) IV Intermittent every 6 hours  dextrose 5% + sodium chloride 0.9%. - Pediatric 1000 milliLiter(s) (85 mL/Hr) IV Continuous <Continuous>  gabapentin Oral Tab/Cap - Peds 300 milliGRAM(s) Oral three times a day  HYDROmorphone PCA (1 mG/mL) - Peds 30 milliLiter(s) PCA Continuous PCA Continuous  lidocaine 4% Transdermal Patch - Peds 1 Patch Transdermal every 24 hours  sodium chloride 0.9%. - Pediatric 1000 milliLiter(s) (20 mL/Hr) IV Continuous <Continuous>    MEDICATIONS  (PRN):  dexAMETHasone IV Intermittent - Pediatric 4 milliGRAM(s) IV Intermittent every 6 hours PRN Nausea, IF ondansetron is ineffective after 30 - 60 minutes  HYDROmorphone PCA (1 mG/mL) Rescue Clinician Bolus - Peds 0.3 milliGRAM(s) IV Push every 15 minutes PRN for Pain Score greater than 6  naloxone  IV Push - Peds 0.1 milliGRAM(s) IV Push every 3 minutes PRN For ANY of the following changes in patient status:  A. RR less than 10 breaths/min, B. Oxygen saturation less than 90%, C. Sedation score of 6  ondansetron IV Intermittent - Peds 4 milliGRAM(s) IV Intermittent every 8 hours PRN Nausea

## 2022-07-14 NOTE — DISCHARGE NOTE PROVIDER - HOSPITAL COURSE
JAYLIN PARADA is a 22y Female who was admitted to [facility] [place] of a left peritoneal mass.    This is a 21 y/o female with a PMH significant for Neurofibromatosis .  She is s/p right adrenalectomy and nephrectomy due to nerve sheath tumor - completed chemotherapy and radiation treatment in March 2011, s/p excision of right occipital plexiform neurofibroma in 2014 and s/p resection of a neurofibroma to her back in Feb 2020, pt s/p Thoracic laminectomy for resection spinal cord tumor , osteoplastic reconstruction 11/2020. She presented for resection for a left peritoneal mass found on MRI/Pet scan.      7/12: Patient underwent exploratory laparotomy with retroperitoneal mass excision. She was transferred to the PACU then floor in stable condition. He pain was controlled with dilauded via PCA pump, lidocaine patch, IV tylenol and gabapentin (pain management recommendations). Fluid boluses were given due to decreased urine output; outputs and vitals were monitored    7/13: Patient vitals and urine output were within normal limits.     7/14: Patient came off of PCA pump, has been out of been, and taken off of fluids. Pain was well-controlled.    At time of discharge, pt was tolerating a regular diet, voiding/stooling independently, ambulating, and pain was well-controlled. Patient and family felt ready for discharge. JAYLIN PARADA is a 22y Female who was admitted to [facility] [place], [place] for resection of a left peritoneal mass.    This is a 23 y/o female with a PMH significant for Neurofibromatosis presenting for resection of left peritoneal mass found on MRI/PET scan.       7/12: Patient underwent exploratory laparotomy with retroperitoneal mass excision. She was transferred to the PACU then floor in stable condition. He pain was controlled with dilauded via PCA pump, lidocaine patch, IV tylenol and gabapentin (pain management recommendations). Fluid boluses were given due to decreased urine output; outputs and vitals were monitored    7/13: Patient vitals and urine output were within normal limits.     7/14: Patient came off of PCA pump, has been out of been, and taken off of fluids. Pain was well-controlled.    7/15: At time of discharge, pt was tolerating a regular diet, voiding/stooling independently, ambulating, and pain was well-controlled. Patient and family felt ready for discharge.

## 2022-07-14 NOTE — PROGRESS NOTE ADULT - SUBJECTIVE AND OBJECTIVE BOX
Anesthesia Pain Management Service    SUBJECTIVE: Pt now off IV PCA without problems reported.    Therapy:	  [ X] IV PCA	   [ ] Epidural           [ ] s/p Spinal Opoid              [ ] Postpartum infusion	  [ ] Patient controlled regional anesthesia (PCRA)    [ ] prn Analgesics    Allergies    albuterol (Rash)          MEDICATIONS  (STANDING):  acetaminophen   Oral Tab/Cap - Peds. 650 milliGRAM(s) Oral every 6 hours  gabapentin Oral Tab/Cap - Peds 300 milliGRAM(s) Oral three times a day  lidocaine 4% Transdermal Patch - Peds 1 Patch Transdermal every 24 hours  sodium chloride 0.9% lock flush - Peds 3 milliLiter(s) IV Push once    MEDICATIONS  (PRN):  dexAMETHasone IV Intermittent - Pediatric 4 milliGRAM(s) IV Intermittent every 6 hours PRN Nausea, IF ondansetron is ineffective after 30 - 60 minutes  morphine  IV  Push - Peds 2.2 milliGRAM(s) IV Push every 4 hours PRN Moderate Pain (4 - 6)  naloxone  IV Push - Peds 0.1 milliGRAM(s) IV Push every 3 minutes PRN For ANY of the following changes in patient status:  A. RR less than 10 breaths/min, B. Oxygen saturation less than 90%, C. Sedation score of 6  ondansetron IV Intermittent - Peds 4 milliGRAM(s) IV Intermittent every 8 hours PRN Nausea      OBJECTIVE:   [X] No new signs     [ ] Other:    Side Effects:  [X ] None			[ ] Other:    Assessment of Catheter Site:		[ ] Intact		[ ] Other:    ASSESSMENT/PLAN  [ ] Continue current therapy    [X ] Therapy changed to:    [ ] IV PCA       [ ] Epidural     [ X] prn Analgesics     Comments: PRN Oral/IV opioids and/or non-opioid adjuvant analgesics to be used at this point.    Progress Note written now but Patient was seen earlier.

## 2022-07-14 NOTE — DISCHARGE NOTE PROVIDER - NSDCMRMEDTOKEN_GEN_ALL_CORE_FT
Benadryl 25 mg oral capsule: 1 cap(s) orally once a day (at bedtime)  Vitamin D3 25 mcg (1000 intl units) oral tablet: 1 tab(s) orally once a day   acetaminophen 325 mg oral tablet: 2 tab(s) orally every 6 hours  ibuprofen 200 mg oral tablet: 2 tab(s) orally every 6 hours    acetaminophen 325 mg oral tablet: 2 tab(s) orally every 6 hours  ibuprofen 200 mg oral tablet: 2 tab(s) orally every 6 hours   oxyCODONE 5 mg oral tablet: 1 tab(s) orally every 6 hours, As Needed -Severe Pain (7 - 10) MDD:4 tabs   acetaminophen 325 mg oral tablet: 2 tab(s) orally every 6 hours  ibuprofen 200 mg oral tablet: 2 tab(s) orally every 6 hours   MiraLax oral powder for reconstitution: 1 cap(s) orally once a day  oxyCODONE 5 mg oral tablet: 1 tab(s) orally every 6 hours, As Needed -Severe Pain (7 - 10) MDD:4 tabs   acetaminophen 325 mg oral tablet: 2 tab(s) orally every 6 hours  MiraLax oral powder for reconstitution: 1 cap(s) orally once a day  oxyCODONE 5 mg oral tablet: 1 tab(s) orally every 6 hours, As Needed -Severe Pain (7 - 10) MDD:4 tabs   acetaminophen 325 mg oral tablet: 2 tab(s) orally every 6 hours  gabapentin 300 mg oral capsule: 1 cap(s) orally 3 times a day  MiraLax oral powder for reconstitution: 1 cap(s) orally once a day  oxyCODONE 5 mg oral tablet: 1 tab(s) orally every 4 hours, As needed, Severe Pain (7 - 10) MDD:4 tabs

## 2022-07-14 NOTE — DISCHARGE NOTE PROVIDER - NSDCFUADDINST_GEN_ALL_CORE_FT
PAIN: You may continue to take  Acetaminophen (Tylenol) and  Ibuprofen (Advil, Motrin) over the counter for pain.   WOUND CARE:  You should allow warm soapy water to run down the wound in the shower. You do not need to scrub the area. You do not have any stitches that need to be removed.   BATHING: Please do not soak or submerge the wound in water (bath, swimming) for 7 days after your surgery.  ACTIVITY: No heavy lifting, straining, or vigorous activity until your follow-up appointment in 2 weeks.   NOTIFY US IF: Your child has any bleeding that does not stop, any pus draining from his/her wound(s), any fever (over 100.4 F) or chills, persistent nausea/vomiting, persistent diarrhea, or if his/her pain is not controlled on their discharge pain medications.  FOLLOW-UP: Please call the office and make an appointment to follow up with Dr. Marr in 2 weeks. Please follow up with your primary care physician in 1-2 weeks regarding your hospitalization.      **PLEASE NOTE OUR CLINIC HAS RECENTLY MOVED LOCATIONS. OUR NEW PHONE NUMBER IS [phone].** PAIN: You may continue to take  Acetaminophen over the counter for pain.   WOUND CARE:  You should allow warm soapy water to run down the wound in the shower. You do not need to scrub the area. You do not have any stitches that need to be removed.   BATHING: Please do not soak or submerge the wound in water (bath, swimming) for 7 days after your surgery.  ACTIVITY: No heavy lifting, straining, or vigorous activity until your follow-up appointment in 2 weeks.   NOTIFY US IF: Your child has any bleeding that does not stop, any pus draining from his/her wound(s), any fever (over 100.4 F) or chills, persistent nausea/vomiting, persistent diarrhea, or if his/her pain is not controlled on their discharge pain medications.  FOLLOW-UP: Please call the office and make an appointment to follow up with Dr. Marr in 2 weeks. Please follow up with your primary care physician in 1-2 weeks regarding your hospitalization.      **PLEASE NOTE OUR CLINIC HAS RECENTLY MOVED LOCATIONS. OUR NEW PHONE NUMBER IS [phone].**

## 2022-07-15 ENCOUNTER — TRANSCRIPTION ENCOUNTER (OUTPATIENT)
Age: 22
End: 2022-07-15

## 2022-07-15 VITALS
RESPIRATION RATE: 18 BRPM | DIASTOLIC BLOOD PRESSURE: 52 MMHG | SYSTOLIC BLOOD PRESSURE: 113 MMHG | TEMPERATURE: 98 F | HEART RATE: 77 BPM | OXYGEN SATURATION: 98 %

## 2022-07-15 RX ORDER — IBUPROFEN 200 MG
2 TABLET ORAL
Qty: 56 | Refills: 0
Start: 2022-07-15 | End: 2022-07-21

## 2022-07-15 RX ORDER — OXYCODONE HYDROCHLORIDE 5 MG/1
1 TABLET ORAL
Qty: 4 | Refills: 0
Start: 2022-07-15 | End: 2022-07-15

## 2022-07-15 RX ORDER — POLYETHYLENE GLYCOL 3350 17 G/17G
1 POWDER, FOR SOLUTION ORAL
Qty: 0 | Refills: 0 | DISCHARGE

## 2022-07-15 RX ORDER — ACETAMINOPHEN 500 MG
2 TABLET ORAL
Qty: 0 | Refills: 0 | DISCHARGE
Start: 2022-07-15

## 2022-07-15 RX ORDER — OXYCODONE HYDROCHLORIDE 5 MG/1
1 TABLET ORAL
Qty: 12 | Refills: 0
Start: 2022-07-15 | End: 2022-07-16

## 2022-07-15 RX ORDER — DIPHENHYDRAMINE HCL 50 MG
1 CAPSULE ORAL
Qty: 0 | Refills: 0 | DISCHARGE

## 2022-07-15 RX ORDER — CHOLECALCIFEROL (VITAMIN D3) 125 MCG
1 CAPSULE ORAL
Qty: 0 | Refills: 0 | DISCHARGE

## 2022-07-15 RX ORDER — ONDANSETRON 8 MG/1
4 TABLET, FILM COATED ORAL ONCE
Refills: 0 | Status: COMPLETED | OUTPATIENT
Start: 2022-07-15 | End: 2022-07-15

## 2022-07-15 RX ORDER — GABAPENTIN 400 MG/1
1 CAPSULE ORAL
Qty: 21 | Refills: 0
Start: 2022-07-15 | End: 2022-07-21

## 2022-07-15 RX ORDER — OXYCODONE HYDROCHLORIDE 5 MG/1
1 TABLET ORAL
Qty: 12 | Refills: 0
Start: 2022-07-15 | End: 2022-07-17

## 2022-07-15 RX ORDER — POLYETHYLENE GLYCOL 3350 17 G/17G
17 POWDER, FOR SOLUTION ORAL ONCE
Refills: 0 | Status: COMPLETED | OUTPATIENT
Start: 2022-07-15 | End: 2022-07-15

## 2022-07-15 RX ADMIN — ONDANSETRON 4 MILLIGRAM(S): 8 TABLET, FILM COATED ORAL at 16:11

## 2022-07-15 RX ADMIN — Medication 650 MILLIGRAM(S): at 06:24

## 2022-07-15 RX ADMIN — GABAPENTIN 300 MILLIGRAM(S): 400 CAPSULE ORAL at 16:11

## 2022-07-15 RX ADMIN — Medication 650 MILLIGRAM(S): at 12:20

## 2022-07-15 RX ADMIN — LIDOCAINE 1 PATCH: 4 CREAM TOPICAL at 12:21

## 2022-07-15 RX ADMIN — OXYCODONE HYDROCHLORIDE 5 MILLIGRAM(S): 5 TABLET ORAL at 01:40

## 2022-07-15 RX ADMIN — Medication 650 MILLIGRAM(S): at 12:50

## 2022-07-15 RX ADMIN — Medication 650 MILLIGRAM(S): at 18:32

## 2022-07-15 RX ADMIN — OXYCODONE HYDROCHLORIDE 5 MILLIGRAM(S): 5 TABLET ORAL at 08:42

## 2022-07-15 RX ADMIN — POLYETHYLENE GLYCOL 3350 17 GRAM(S): 17 POWDER, FOR SOLUTION ORAL at 13:05

## 2022-07-15 RX ADMIN — GABAPENTIN 300 MILLIGRAM(S): 400 CAPSULE ORAL at 08:42

## 2022-07-15 NOTE — DISCHARGE NOTE NURSING/CASE MANAGEMENT/SOCIAL WORK - NSTRANSFERBELONGINGSDISPO_GEN_A_NUR
[address] [facility] Internal Medicine- Pediatrics      Patient Name: Bob Sloan    YOB: 1981    Today's Date: 10/29/18           Chief Complaint   Patient presents with    Diabetes          Subjective:  Taking Adipex for the past month prescribed by a physician in [place]. 24 Hour recall  [place] sandwiches x2, pringles, juice  2 grilled cheese sandwiches   chicken noodle soup  double cheeseburger  tater tots       Eats out when he has to work      Normally only eats once per day  Drinks ETOH 1-2 times per week     Wife says he eats too much in on sitting    Patient has a history of ODD diagnosed as a child. Still has problems with attention impairment, losing things, not taking responsibility for actions. Family history of Bipolar Disorder in mother. Diabetes   He presents for his follow-up diabetic visit. He has type 2 diabetes mellitus. The initial diagnosis of diabetes was made 3 months ago. His disease course has been stable. There are no hypoglycemic associated symptoms. Pertinent negatives for hypoglycemia include no dizziness or headaches. Pertinent negatives for diabetes include no blurred vision, no chest pain, no fatigue, no foot paresthesias, no foot ulcerations, no polydipsia, no polyphagia, no polyuria, no visual change, no weakness and no weight loss. There are no hypoglycemic complications. There are no diabetic complications. Risk factors for coronary artery disease include sedentary lifestyle, male sex and obesity. Current diabetic treatment includes oral agent (monotherapy). He is compliant with treatment none of the time (stopped medication when refills  ). His weight is decreasing steadily. He is following a generally unhealthy diet. When asked about meal planning, he reported none. He has not had a previous visit with a dietitian. He never participates in exercise. He does not see a podiatrist.Eye exam is not current.        History:     Past not applicable

## 2022-07-15 NOTE — DISCHARGE NOTE NURSING/CASE MANAGEMENT/SOCIAL WORK - PATIENT PORTAL LINK FT
You can access the FollowMyHealth Patient Portal offered by [facility] by registering at the following website: [email or web address]. By joining [employer]’s FollowMyHealth portal, you will also be able to view your health information using other applications (apps) compatible with our system.

## 2022-07-15 NOTE — PROGRESS NOTE PEDS - SUBJECTIVE AND OBJECTIVE BOX
PEDIATRIC SURGERY DAILY PROGRESS NOTE:     Interval events: No acute events overnight.    Subjective: Patient seen and examined at bedside.      Objective:    Vital Signs Last 24 Hrs  T(C): 36.9 (15 Jul 2022 02:22), Max: 37.7 (14 Jul 2022 22:14)  T(F): 98.4 (15 Jul 2022 02:22), Max: 99.9 (14 Jul 2022 22:14)  HR: 99 (15 Jul 2022 02:22) (96 - 114)  BP: 109/69 (15 Jul 2022 02:22) (99/58 - 110/72)  BP(mean): --  RR: 20 (15 Jul 2022 02:22) (18 - 20)  SpO2: 98% (15 Jul 2022 02:22) (98% - 100%)    Parameters below as of 15 Jul 2022 02:22  Patient On (Oxygen Delivery Method): room air        I&O's Detail    13 Jul 2022 07:01  -  14 Jul 2022 07:00  --------------------------------------------------------  IN:    dextrose 5% + sodium chloride 0.9% - Pediatric: 1360 mL    Oral Fluid: 360 mL    sodium chloride 0.9% - Pediatric: 320 mL  Total IN: 2040 mL    OUT:    Indwelling Catheter - Urethral (mL): 300 mL    Voided (mL): 1200 mL  Total OUT: 1500 mL    Total NET: 540 mL      14 Jul 2022 07:01  -  15 Jul 2022 03:29  --------------------------------------------------------  IN:    Oral Fluid: 600 mL  Total IN: 600 mL    OUT:    Voided (mL): 2655 mL  Total OUT: 2655 mL    Total NET: -2055 mL            General: NAD, well-nourished  HEENT: Atraumatic, EOMI  Resp: Breathing comfortably on RA  CV: Normal sinus rhythm  Abd:   Ext: ROMIx4, motor strength intact x 4      LABS:                RADIOLOGY & ADDITIONAL STUDIES:    MEDICATIONS  (STANDING):  acetaminophen   Oral Tab/Cap - Peds. 650 milliGRAM(s) Oral every 6 hours  gabapentin Oral Tab/Cap - Peds 300 milliGRAM(s) Oral three times a day  lidocaine 4% Transdermal Patch - Peds 1 Patch Transdermal every 24 hours    MEDICATIONS  (PRN):  dexAMETHasone IV Intermittent - Pediatric 4 milliGRAM(s) IV Intermittent every 6 hours PRN Nausea, IF ondansetron is ineffective after 30 - 60 minutes  naloxone  IV Push - Peds 0.1 milliGRAM(s) IV Push every 3 minutes PRN For ANY of the following changes in patient status:  A. RR less than 10 breaths/min, B. Oxygen saturation less than 90%, C. Sedation score of 6  ondansetron IV Intermittent - Peds 4 milliGRAM(s) IV Intermittent every 8 hours PRN Nausea  oxyCODONE   IR Oral Tab/Cap - Peds 5 milliGRAM(s) Oral every 4 hours PRN Severe Pain (7 - 10)

## 2022-07-15 NOTE — PROGRESS NOTE PEDS - ATTENDING COMMENTS
Patient seen and examined  POD#1 - excision of left retroperitoneal mass  No acute post-op events  Initial issues with pain control, improved  Afeb, HD stable  NAD  Abd:  Non-distended, expected post-op tenderness, incision intact w/o erythema or drainage  Adequate u/o, urine clear in appearance this am  D/C [place]  Diet as kj  Analgesia  Encourage OOB, ambulation
Patient seen and examined  POD#3 - ELAP for excision of left retroperitoneal mass  No acute 24 hour events  Denies flatus or BM  Afeb, HD stable  Gen:  Comfortably sitting in bed, NAD  Abd:  non-distended, minimal tenderness, incision intact w/o erythema or drainage  Adequate u/o  Initiate bowel regimen  Encourage ambulation, reg diet  D/C planning
Pt seen and examined  POD#2 s/p resection of left retroperitoneal mass  Feeling better today, pain better controlled with IV PCA and neurontin  Tolerating clears and some PO  no BM  No fevers, no emesis  Ambulated well yesterday, using IS  Abdomen soft, nontender, nondistended  INcision healing well  Good urine output , pink tinged per patient    Will d/c PCA today and transition to PO pain meds  Continue ambulation/IS/OOB  Monitor urine output  Monitor PO intake  Plan d/w Cate and mom at [facility], all questions answered, offered reassurance

## 2022-07-15 NOTE — PROGRESS NOTE PEDS - ASSESSMENT
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 Patient was taken to the OR on 7/12 for L RP mass excision. Moved to floor with improvement in urine output and color. Pain controlled with IV meds     Plan:  - Pain control, d/c PCA  - Regular Diet   - Encourage OOB and ambulation  - Saline lock- good PO intake  - NO NSAIDS

## 2022-07-15 NOTE — DISCHARGE NOTE NURSING/CASE MANAGEMENT/SOCIAL WORK - NSDCPEFALRISK_GEN_ALL_CORE
For information on Fall & Injury Prevention, visit: [email or web address] OR  [email or web address] OR  https://www.cdc.gov/steadi/patient.html

## 2022-07-19 LAB — SURGICAL PATHOLOGY STUDY: SIGNIFICANT CHANGE UP

## 2022-07-26 ENCOUNTER — APPOINTMENT (OUTPATIENT)
Dept: PEDIATRIC SURGERY | Facility: CLINIC | Age: 22
End: 2022-07-26

## 2022-07-26 VITALS
TEMPERATURE: 94.3 F | SYSTOLIC BLOOD PRESSURE: 98 MMHG | DIASTOLIC BLOOD PRESSURE: 64 MMHG | OXYGEN SATURATION: 100 % | HEART RATE: 101 BPM | BODY MASS INDEX: 17.59 KG/M2 | WEIGHT: 94.36 LBS | HEIGHT: 61.26 IN

## 2022-07-26 DIAGNOSIS — R19.00 INTRA-ABDOMINAL AND PELVIC SWELLING, MASS AND LUMP, UNSPECIFIED SITE: ICD-10-CM

## 2022-07-26 PROCEDURE — 99024 POSTOP FOLLOW-UP VISIT: CPT

## 2022-07-27 RX ORDER — OXYCODONE 5 MG/1
5 TABLET ORAL
Qty: 12 | Refills: 0 | Status: COMPLETED | COMMUNITY
Start: 2022-07-18

## 2022-07-27 NOTE — REASON FOR VISIT
[____ Week(s)] : [unfilled] week(s)  [Other: ____] : [unfilled] [Patient] : patient [Mother] : mother [de-identified] : 07/12/2022 [de-identified] : Derrick Marr & Kasi Davies [de-identified] : Since the procedure, Cate has been doing well.  Her pain has been improving and is currently only taking Tylenol and Gabapentin. She takes the gabapentin before sleep.  Her appetite has not normalized as of yet but it is slowly improving and yesterday she had three meals.  She is having daily bowel movements.  She has not had any fevers.  She denies any redness or drainage at the incision site.  She says the swelling around her incision has slowly been improving.  She denies any urinary symptoms and states her urine has been clear yellow since discharge.

## 2022-07-27 NOTE — CONSULT LETTER
[Dear  ___] : Dear  [unfilled], [Consult Letter:] : I had the pleasure of evaluating your patient, [unfilled]. [Please see my note below.] : Please see my note below. [Consult Closing:] : Thank you very much for allowing me to participate in the care of this patient.  If you have any questions, please do not hesitate to contact me. [Sincerely,] : Sincerely, [FreeTextEntry2] : Dr Juancarlos Bo\par [address] \[place] [address] [FreeTextEntry3] : Kamaljit Marr MD\par Division of Pediatric, General, Thoracic and Endoscopic Surgery\par [facility]

## 2022-07-27 NOTE — ADDENDUM
[FreeTextEntry1] : Documented by Ino Daugherty acting as a scribe for Dr. Marr on 07/26/2022.\par \par All medical record entries made by the Scribe were at my, Dr. Marr, direction and personally dictated by me on 07/26/2022. I have reviewed the chart and agree that the record accurately reflects my personal performances of the history, physical exam, assessment and plan. I have also personally directed, reviewed, and agree with the discharge instructions.

## 2022-07-27 NOTE — ASSESSMENT
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  She has been doing well and has recovered quite nicely.  Her incision is healing well.  I reviewed the pathology with Dr Powers of pediatric pathology and then with Cate and her mother.  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 I counseled Cate and mom about this diagnosis, and offered reassurance that this is not a malignant lesion.  We will review her case at tumor board tomorrow and we will update them with any further recommendations.  However, they are following up with Dr Cerda in September.  I reviewed postoperative expectations with Cate and mom.  Her appetite is slowly improving but she has lost weight since the surgery.  I recommended starting Ensure clears for nutritional supplementation and Cate will contact me in the upcoming weeks if her appetite has not returned.  I recommended not returning to full activities for another 2-4 weeks.  They know to contact me going forward with any further questions or concerns.   Cate can return to see us on an as needed basis.

## 2022-08-15 ENCOUNTER — NON-APPOINTMENT (OUTPATIENT)
Age: 22
End: 2022-08-15

## 2022-09-01 ENCOUNTER — OUTPATIENT (OUTPATIENT)
Dept: OUTPATIENT SERVICES | Age: 22
LOS: 1 days | Discharge: HOME | End: 2022-09-01

## 2022-09-01 DIAGNOSIS — Z98.890 OTHER SPECIFIED POSTPROCEDURAL STATES: Chronic | ICD-10-CM

## 2022-09-01 DIAGNOSIS — D36.10 BENIGN NEOPLASM OF PERIPHERAL NERVES AND AUTONOMIC NERVOUS SYSTEM, UNSPECIFIED: Chronic | ICD-10-CM

## 2022-09-01 DIAGNOSIS — Z78.9 OTHER SPECIFIED HEALTH STATUS: Chronic | ICD-10-CM

## 2022-09-02 ENCOUNTER — APPOINTMENT (OUTPATIENT)
Dept: PEDIATRIC HEMATOLOGY/ONCOLOGY | Facility: CLINIC | Age: 22
End: 2022-09-02

## 2022-09-02 VITALS
HEART RATE: 105 BPM | WEIGHT: 94.36 LBS | RESPIRATION RATE: 18 BRPM | TEMPERATURE: 97.81 F | BODY MASS INDEX: 17.14 KG/M2 | SYSTOLIC BLOOD PRESSURE: 102 MMHG | OXYGEN SATURATION: 100 % | DIASTOLIC BLOOD PRESSURE: 70 MMHG | HEIGHT: 62.09 IN

## 2022-09-02 PROCEDURE — 99214 OFFICE O/P EST MOD 30 MIN: CPT

## 2022-09-06 DIAGNOSIS — D36.10 BENIGN NEOPLASM OF PERIPHERAL NERVES AND AUTONOMIC NERVOUS SYSTEM, UNSPECIFIED: ICD-10-CM

## 2022-09-08 NOTE — PHYSICAL EXAM
[PERRLA] : SUKHDEEP [EOMI] : EOMI  [Motor Exam nomal] : motor exam normal [No Dysmetria] : no dysmetria  [Normal] : affect appropriate [90: Able to carry normal activity; minor signs or symptoms of disease.] : 90: Able to carry normal activity; minor signs or symptoms of disease.  [de-identified] : Large healed post surgical scars bilateral abdomen;  [de-identified] : tenderness along right lower back [de-identified] : right foot in ankle brace that supports toes [de-identified] : many cafe au lait spots scattered diffusely  [de-identified] : multiple areas of numbness and decreased sensation on trunk and lower extremities.  right leg virtually normal strength and gait.

## 2022-09-08 NOTE — CONSULT LETTER
[Dear  ___] : Dear  [unfilled], [Courtesy Letter:] : I had the pleasure of seeing your patient, [unfilled], in my office today. [Please see my note below.] : Please see my note below. [Consult Closing:] : Thank you very much for allowing me to participate in the care of this patient.  If you have any questions, please do not hesitate to contact me. [Sincerely,] : Sincerely, [DrElaine  ___] : Dr. MCCALLUM [FreeTextEntry2] : Juancarlos oB MD\par [address] [place] [address], [address]\[place] [address]\par Tel. #: [phone]\par Fax #: [phone] [FreeTextEntry3] : Wan Cerda MD \par Chief, [facility]\par  of Pediatrics\par [facility]\par

## 2022-09-08 NOTE — HISTORY OF PRESENT ILLNESS
[de-identified] : Cate is a 22  year-old young woman with NF1 who first came to the attention of our division in 2010 at age 10 when she presented with abdominal pain and a RUQ mass which was resected by Dr Anthony Smith in August 2010, and she then required ipsilateral nephrectomy secondary to renal infarct and poor perfusion in September 2010.  This was consistent with a Malignant Peripheral Nerve Sheath Tumor for which she received chemotherapy and radiation.  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  She had right lower back pain in 2020 which resolved after resection of intradural tumor by Dr Griffith and Kendy in February 2020; this was reresected in November 2020 for persistent asymptomatic growth. \par \par She began on Selumetinib in September 2021 for her plexiform neurofibromata, but had to discontinue it after approximately one week because of uveitis, subsequently resolved. [de-identified] : Vision is back to baseline acuity, still with some mild, occasional photosensitivity.  Per Dr Goldberg, her ophthalmologist, we can reattempt Selumetinib, but Cate is not interested\par \par Gait substantially improved.\par \par Using new brace for her right foot which helps with her toes but isn't bulky covering her entire lower leg.\par \par No new symptoms

## 2022-10-05 NOTE — PATIENT PROFILE PEDIATRIC. - AGE OF PATIENT
Returned patient's phone call .  Verified name and . Informed of test results . RX sent to pharmacy with instructions . Pt verbalized understanding.    9 years or older (need ONE dose)...

## 2022-11-09 NOTE — PHYSICAL EXAM
[PERRLA] : SUKHDEEP [EOMI] : EOMI  [Motor Exam nomal] : motor exam normal [No Dysmetria] : no dysmetria  [Normal] : affect appropriate [90: Able to carry normal activity; minor signs or symptoms of disease.] : 90: Able to carry normal activity; minor signs or symptoms of disease.  [de-identified] : Large healed post surgical scar on right abdomen; palpable mass left abdomen consistent with known left psoas mass [de-identified] : tenderness along right lower back [de-identified] : right foot in ankle brace that supports toes [de-identified] : many cafe au lait spots scattered diffusely  [de-identified] : multiple areas of numbness and decreased sensation on trunk and lower extremities.  right leg with unstable knee on gait

## 2022-11-09 NOTE — HISTORY OF PRESENT ILLNESS
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  6 years ago she underwent right adrenalectomy and nephrectomy.  [place] [facility] [place] over one year ago.  She follows with opthalmology, nephrology and cardiology regularly.\par \par Cate's  Right lower back pain resolved after resection of intradural tumor last year by Dr Griffith and Kendy in February 2020, was reresected in November for persistent asymptomatic growth.  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 [de-identified] : Vision is back to baseline acuity, still with some mild, occasional photosensitivity.  Per Dr Goldberg, her ophthalmologist, we can reattempt Selumetinib.\par \par walks without a cane and has no more pain, off gabapentin.\par \par Using new brace for her right foot which helps with her toes but isn't bulky covering her entire lower leg.\par \par No new symptoms

## 2022-11-09 NOTE — CONSULT LETTER
[Dear  ___] : Dear  [unfilled], [Courtesy Letter:] : I had the pleasure of seeing your patient, [unfilled], in my office today. [Please see my note below.] : Please see my note below. [Consult Closing:] : Thank you very much for allowing me to participate in the care of this patient.  If you have any questions, please do not hesitate to contact me. [Sincerely,] : Sincerely, [DrElaine  ___] : Dr. MCCALLUM [FreeTextEntry2] : Juancarlos Bo MD\par [address] [place] [address], [address]\[place] [address]\par Tel. #: [phone]\par Fax #: [phone] [FreeTextEntry3] : Wan Cerda MD \par Chief, Child[facility]\par  of Pediatrics\par [employer]\par

## 2022-11-16 NOTE — H&P PST ADULT - VENOUS THROMBOEMBOLISM BMI
Detail Level: Zone Plan: Patient was just released from hospital for pancreatitis. I suspect that this outbreak is being caused by his cholesterol being very elevated. Patient will follow up with PCP on Friday for cholesterol regulation \\nOffice will contact PCP and send lab results (0) indicator not present

## 2022-12-13 ENCOUNTER — APPOINTMENT (OUTPATIENT)
Dept: PEDIATRIC NEUROLOGY | Facility: CLINIC | Age: 22
End: 2022-12-13

## 2022-12-13 ENCOUNTER — APPOINTMENT (OUTPATIENT)
Dept: PEDIATRIC HEMATOLOGY/ONCOLOGY | Facility: CLINIC | Age: 22
End: 2022-12-13

## 2022-12-13 VITALS
WEIGHT: 99 LBS | HEIGHT: 62.09 IN | BODY MASS INDEX: 17.99 KG/M2 | SYSTOLIC BLOOD PRESSURE: 104 MMHG | OXYGEN SATURATION: 99 % | TEMPERATURE: 208.94 F | HEART RATE: 99 BPM | DIASTOLIC BLOOD PRESSURE: 71 MMHG

## 2022-12-13 PROCEDURE — 99214 OFFICE O/P EST MOD 30 MIN: CPT

## 2022-12-13 RX ORDER — FUROSEMIDE 40 MG
20 TABLET ORAL ONCE
Refills: 0 | Status: COMPLETED | OUTPATIENT
Start: 2022-12-19 | End: 2022-12-19

## 2022-12-13 NOTE — REASON FOR VISIT
[Follow-Up Evaluation] : a follow-up evaluation for [Other: ____] : [unfilled] [Mother] : mother [FreeTextEntry2] : [facility] with Dr. Cerda; Dr. Layla Ha, peds resident, Dr. Kyung Powell neuro resident, and Martha sanchez

## 2022-12-13 NOTE — HISTORY OF PRESENT ILLNESS
[FreeTextEntry1] : JAYLIN has NF1. 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 S/P surgery to remove a neurofibroma from the scalp. Fainted in March 2019. 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 \par Previously seen by Dr. Beck, Derm (2019); Dr. Garcia, nephro (2017), Dr. 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 Griffith NSx for intramedullary tumors at T10-T11\par She was on Selumetinib but within few days she developed side effects; Seen by Dr. Packer 9/20/2021: anterior uveitis bilaterally; seen by Dr. 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 Cerda), PM&R consult for possible PT for right LE\par \par Last imaging: Brain and Orbit in May 2021, C-T-L spine MRI 2/3/22, Abdomen and Pelvis MRI 4/25/22 \par \par Seen by Dr. Prado, PM&R, Dec 2021, AFO advised to prevent falling; PT\par Seen by Dr. Marr, peds sx, on 3/29/22, after MRI in Jan 2022 showed left retroperitoneal mass growth. 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 Mother reports JAYLIN buckled and fell; did not follow up with PM&R. Her right knee remains an issue. She is using soft brace for right knee and right ankle. \par JAYLIN has no other complaints; no aches or pains; no growing or changing NF. She is working in a store. \par Scheduled for MRI 12/19/22

## 2022-12-13 NOTE — CONSULT LETTER
[Dear  ___] : Dear  [unfilled], [Consult Letter:] : I had the pleasure of evaluating your patient, [unfilled]. [Please see my note below.] : Please see my note below. [Consult Closing:] : Thank you very much for allowing me to participate in the care of this patient.  If you have any questions, please do not hesitate to contact me. [Sincerely,] : Sincerely, [FreeTextEntry3] : Ina Guillen M.D\par Pediatric neurology attending\par Neurofibromatosis clinic Co-director\par [facility]\par [facility]\par Tel: [phone]\par Fax: [phone]\par

## 2022-12-13 NOTE — ASSESSMENT
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 S/P surgery to remove a neurofibroma from the scalp. S/P resection of intramedullary pilocytic astrocytoma 2020 after that ambulated with AFO and a quad cane. She continues to have gait issues. Seen by PM&R about a year ago but did not follow up since then. She fell few days ago. She started tx with Selumetinib spring 2021 but within few day after that she developed uveitis. Off treatment and improvement of vision. S/P removal of left retroperitoneal NF July 2022. Follows closely with ophtho. She has no complaints today. On exam right leg weakness\par \par PLAN:\par - F/U w/ PM&R\par - MRI 12/19/22\par - F/U 6 months; sooner as needed\par \par

## 2022-12-13 NOTE — PHYSICAL EXAM
[Well-appearing] : well-appearing [Soft] : soft [No organomegaly] : no organomegaly [Alert] : alert [Well related, good eye contact] : well related, good eye contact [Conversant] : conversant [Full extraocular movements] : full extraocular movements [No nystagmus] : no nystagmus [No facial asymmetry or weakness] : no facial asymmetry or weakness [Equal palate elevation] : equal palate elevation [Good shoulder shrug] : good shoulder shrug [Normal tongue movement] : normal tongue movement [Normal finger tapping and fine finger movements] : normal finger tapping and fine finger movements [2+ biceps] : 2+ biceps [No ankle clonus] : no ankle clonus [No dysmetria on FTNT] : no dysmetria on FTNT [Negative Romberg] : negative Romberg [L] : left [R] : right [de-identified] : awake, alert, in NAD [de-identified] : throat clear; right periorbital hematoma (from the fall that she had few days ago) [de-identified] : several surgical scars, healed [de-identified] : JEANNE macules; cutaneous neurofibromas [de-identified] : right foot drop, using a brace [de-identified] : right leg proximal and distal weakness [de-identified] : difficulty to heel walk right leg [de-identified] : brisk patella right [de-identified] : unsteady gait

## 2022-12-14 NOTE — HISTORY OF PRESENT ILLNESS
[de-identified] : Cate is a 22  year-old young woman with NF1 who first came to the attention of our division in 2010 at age 10 when she presented with abdominal pain and a RUQ mass which was resected by Dr Anthony Smith in August 2010, and she then required ipsilateral nephrectomy secondary to renal infarct and poor perfusion in September 2010.  This was consistent with a Malignant Peripheral Nerve Sheath Tumor for which she received chemotherapy and radiation.  She follows with opthalmology, nephrology and cardiology regularly.\par \par aCte's had multiple symptomatic plexiform neurofibromata requiring resection, of her chest wall (2017) several intraspinal (2020) and left retroperitoneal (2022) (atypical Neurofibroma).  She had right lower back pain in 2020 which resolved after resection of intradural tumor by Dr Griffith and Kendy in February 2020; this was reresected in November 2020 for persistent asymptomatic growth. \par \par She began on Selumetinib in September 2021 for her plexiform neurofibromata, but had to discontinue it after approximately one week because of uveitis, subsequently resolved. [de-identified] : Vision is back to baseline acuity, still with some mild, occasional photosensitivity.  Per Dr Goldberg, her ophthalmologist, we can reattempt Selumetinib, but Cate is not interested\par \par Gait substantially improved.\par \par Using new brace for her right foot which helps with her toes but isn't bulky covering her entire lower leg.\par \par No new symptoms

## 2022-12-14 NOTE — PHYSICAL EXAM
[PERRLA] : SUKHDEEP [EOMI] : EOMI  [Motor Exam nomal] : motor exam normal [No Dysmetria] : no dysmetria  [Normal] : affect appropriate [90: Able to carry normal activity; minor signs or symptoms of disease.] : 90: Able to carry normal activity; minor signs or symptoms of disease.  [de-identified] : Large healed post surgical scars bilateral abdomen;  [de-identified] : tenderness along right lower back [de-identified] : right foot and knee in soft brace [de-identified] : many cafe au lait spots scattered diffusely; ecchymosis below right eye [de-identified] : multiple areas of numbness and decreased sensation on trunk and lower extremities, though sensation on right LE is improved from previous.  right leg virtually normal strength and gait.

## 2022-12-14 NOTE — CONSULT LETTER
[Dear  ___] : Dear  [unfilled], [Courtesy Letter:] : I had the pleasure of seeing your patient, [unfilled], in my office today. [Please see my note below.] : Please see my note below. [Consult Closing:] : Thank you very much for allowing me to participate in the care of this patient.  If you have any questions, please do not hesitate to contact me. [Sincerely,] : Sincerely, [DrElaine  ___] : Dr. MCCALLUM [FreeTextEntry2] : Juancarlos Bo MD\par [address] [place] [address], [address]\[place] [address]\par Tel. #: [phone]\par Fax #: [phone] [FreeTextEntry3] : Wan Cerda MD \par Chief, [facility]\par  of Pediatrics\par [facility]\par

## 2022-12-19 ENCOUNTER — RESULT REVIEW (OUTPATIENT)
Age: 22
End: 2022-12-19

## 2022-12-19 ENCOUNTER — OUTPATIENT (OUTPATIENT)
Dept: OUTPATIENT SERVICES | Age: 22
LOS: 1 days | End: 2022-12-19

## 2022-12-19 ENCOUNTER — APPOINTMENT (OUTPATIENT)
Dept: MRI IMAGING | Facility: HOSPITAL | Age: 22
End: 2022-12-19

## 2022-12-19 DIAGNOSIS — D36.10 BENIGN NEOPLASM OF PERIPHERAL NERVES AND AUTONOMIC NERVOUS SYSTEM, UNSPECIFIED: Chronic | ICD-10-CM

## 2022-12-19 DIAGNOSIS — Z78.9 OTHER SPECIFIED HEALTH STATUS: Chronic | ICD-10-CM

## 2022-12-19 DIAGNOSIS — Z98.890 OTHER SPECIFIED POSTPROCEDURAL STATES: Chronic | ICD-10-CM

## 2022-12-19 DIAGNOSIS — C47.9 MALIGNANT NEOPLASM OF PERIPHERAL NERVES AND AUTONOMIC NERVOUS SYSTEM, UNSPECIFIED: ICD-10-CM

## 2022-12-19 PROCEDURE — 74183 MRI ABD W/O CNTR FLWD CNTR: CPT | Mod: 26

## 2022-12-19 PROCEDURE — 72197 MRI PELVIS W/O & W/DYE: CPT | Mod: 26

## 2022-12-19 RX ORDER — SODIUM CHLORIDE 9 MG/ML
900 INJECTION INTRAMUSCULAR; INTRAVENOUS; SUBCUTANEOUS ONCE
Refills: 0 | Status: COMPLETED | OUTPATIENT
Start: 2022-12-19 | End: 2022-12-19

## 2022-12-19 RX ADMIN — SODIUM CHLORIDE 1200 MILLILITER(S): 9 INJECTION INTRAMUSCULAR; INTRAVENOUS; SUBCUTANEOUS at 13:15

## 2022-12-19 RX ADMIN — Medication 4 MILLIGRAM(S): at 16:10

## 2022-12-20 ENCOUNTER — RESULT REVIEW (OUTPATIENT)
Age: 22
End: 2022-12-20

## 2022-12-21 ENCOUNTER — NON-APPOINTMENT (OUTPATIENT)
Age: 22
End: 2022-12-21

## 2023-01-03 ENCOUNTER — APPOINTMENT (OUTPATIENT)
Dept: MRI IMAGING | Facility: HOSPITAL | Age: 23
End: 2023-01-03
Payer: COMMERCIAL

## 2023-06-26 ENCOUNTER — RESULT REVIEW (OUTPATIENT)
Age: 23
End: 2023-06-26

## 2023-06-26 ENCOUNTER — OUTPATIENT (OUTPATIENT)
Dept: OUTPATIENT SERVICES | Age: 23
LOS: 1 days | End: 2023-06-26

## 2023-06-26 ENCOUNTER — APPOINTMENT (OUTPATIENT)
Dept: MRI IMAGING | Facility: HOSPITAL | Age: 23
End: 2023-06-26
Payer: COMMERCIAL

## 2023-06-26 ENCOUNTER — APPOINTMENT (OUTPATIENT)
Dept: MRI IMAGING | Facility: HOSPITAL | Age: 23
End: 2023-06-26

## 2023-06-26 DIAGNOSIS — C47.9 MALIGNANT NEOPLASM OF PERIPHERAL NERVES AND AUTONOMIC NERVOUS SYSTEM, UNSPECIFIED: ICD-10-CM

## 2023-06-26 DIAGNOSIS — D36.10 BENIGN NEOPLASM OF PERIPHERAL NERVES AND AUTONOMIC NERVOUS SYSTEM, UNSPECIFIED: Chronic | ICD-10-CM

## 2023-06-26 DIAGNOSIS — Z98.890 OTHER SPECIFIED POSTPROCEDURAL STATES: Chronic | ICD-10-CM

## 2023-06-26 DIAGNOSIS — Z78.9 OTHER SPECIFIED HEALTH STATUS: Chronic | ICD-10-CM

## 2023-06-26 DIAGNOSIS — R26.9 UNSPECIFIED ABNORMALITIES OF GAIT AND MOBILITY: ICD-10-CM

## 2023-06-26 DIAGNOSIS — D36.10 BENIGN NEOPLASM OF PERIPHERAL NERVES AND AUTONOMIC NERVOUS SYSTEM, UNSPECIFIED: ICD-10-CM

## 2023-06-26 DIAGNOSIS — Q85.01 NEUROFIBROMATOSIS, TYPE 1: ICD-10-CM

## 2023-06-26 PROCEDURE — 74183 MRI ABD W/O CNTR FLWD CNTR: CPT | Mod: 26

## 2023-06-26 PROCEDURE — 72197 MRI PELVIS W/O & W/DYE: CPT | Mod: 26

## 2023-06-26 RX ORDER — FUROSEMIDE 40 MG
20 TABLET ORAL ONCE
Refills: 0 | Status: COMPLETED | OUTPATIENT
Start: 2023-06-26 | End: 2023-06-26

## 2023-06-26 RX ORDER — SODIUM CHLORIDE 9 MG/ML
900 INJECTION INTRAMUSCULAR; INTRAVENOUS; SUBCUTANEOUS ONCE
Refills: 0 | Status: COMPLETED | OUTPATIENT
Start: 2023-06-26 | End: 2023-06-26

## 2023-06-26 RX ADMIN — SODIUM CHLORIDE 1200 MILLILITER(S): 9 INJECTION INTRAMUSCULAR; INTRAVENOUS; SUBCUTANEOUS at 14:30

## 2023-06-26 RX ADMIN — Medication 4 MILLIGRAM(S): at 17:31

## 2023-07-12 ENCOUNTER — APPOINTMENT (OUTPATIENT)
Dept: MRI IMAGING | Facility: HOSPITAL | Age: 23
End: 2023-07-12

## 2023-07-12 ENCOUNTER — RESULT REVIEW (OUTPATIENT)
Age: 23
End: 2023-07-12

## 2023-07-12 ENCOUNTER — OUTPATIENT (OUTPATIENT)
Dept: OUTPATIENT SERVICES | Age: 23
LOS: 1 days | End: 2023-07-12

## 2023-07-12 DIAGNOSIS — Z98.890 OTHER SPECIFIED POSTPROCEDURAL STATES: Chronic | ICD-10-CM

## 2023-07-12 DIAGNOSIS — Z78.9 OTHER SPECIFIED HEALTH STATUS: Chronic | ICD-10-CM

## 2023-07-12 DIAGNOSIS — C47.9 MALIGNANT NEOPLASM OF PERIPHERAL NERVES AND AUTONOMIC NERVOUS SYSTEM, UNSPECIFIED: ICD-10-CM

## 2023-07-12 DIAGNOSIS — D36.10 BENIGN NEOPLASM OF PERIPHERAL NERVES AND AUTONOMIC NERVOUS SYSTEM, UNSPECIFIED: Chronic | ICD-10-CM

## 2023-07-12 PROCEDURE — 72158 MRI LUMBAR SPINE W/O & W/DYE: CPT | Mod: 26

## 2023-08-15 ENCOUNTER — APPOINTMENT (OUTPATIENT)
Dept: PEDIATRIC HEMATOLOGY/ONCOLOGY | Facility: CLINIC | Age: 23
End: 2023-08-15
Payer: COMMERCIAL

## 2023-08-15 ENCOUNTER — APPOINTMENT (OUTPATIENT)
Dept: PEDIATRIC NEUROLOGY | Facility: CLINIC | Age: 23
End: 2023-08-15
Payer: COMMERCIAL

## 2023-08-15 VITALS
HEIGHT: 61.81 IN | SYSTOLIC BLOOD PRESSURE: 100 MMHG | WEIGHT: 95 LBS | HEART RATE: 97 BPM | BODY MASS INDEX: 17.48 KG/M2 | DIASTOLIC BLOOD PRESSURE: 65 MMHG

## 2023-08-15 VITALS
DIASTOLIC BLOOD PRESSURE: 65 MMHG | HEART RATE: 97 BPM | BODY MASS INDEX: 17.48 KG/M2 | HEIGHT: 61.81 IN | SYSTOLIC BLOOD PRESSURE: 100 MMHG | WEIGHT: 95 LBS

## 2023-08-15 DIAGNOSIS — C47.9 MALIGNANT NEOPLASM OF PERIPHERAL NERVES AND AUTONOMIC NERVOUS SYSTEM, UNSPECIFIED: ICD-10-CM

## 2023-08-15 DIAGNOSIS — C72.30 MALIGNANT NEOPLASM OF UNSPECIFIED OPTIC NERVE: ICD-10-CM

## 2023-08-15 DIAGNOSIS — S89.91XS UNSPECIFIED INJURY OF RIGHT LOWER LEG, SEQUELA: ICD-10-CM

## 2023-08-15 DIAGNOSIS — D36.10 BENIGN NEOPLASM OF PERIPHERAL NERVES AND AUTONOMIC NERVOUS SYSTEM, UNSPECIFIED: ICD-10-CM

## 2023-08-15 DIAGNOSIS — C71.9 MALIGNANT NEOPLASM OF BRAIN, UNSPECIFIED: ICD-10-CM

## 2023-08-15 PROCEDURE — 99215 OFFICE O/P EST HI 40 MIN: CPT

## 2023-08-15 PROCEDURE — XXXXX: CPT | Mod: 1L

## 2023-08-17 PROBLEM — C71.9 PILOCYTIC ASTROCYTOMA: Status: ACTIVE | Noted: 2020-11-30

## 2023-08-17 PROBLEM — D36.10 PLEXIFORM NEUROFIBROMA: Status: ACTIVE | Noted: 2017-07-25

## 2023-08-17 LAB
25(OH)D3 SERPL-MCNC: 31.4 NG/ML
ALBUMIN SERPL ELPH-MCNC: 4.7 G/DL
ALP BLD-CCNC: 113 U/L
ALT SERPL-CCNC: 12 U/L
ANION GAP SERPL CALC-SCNC: 12 MMOL/L
AST SERPL-CCNC: 18 U/L
BILIRUB SERPL-MCNC: 0.3 MG/DL
BUN SERPL-MCNC: 18 MG/DL
CALCIUM SERPL-MCNC: 9.4 MG/DL
CHLORIDE SERPL-SCNC: 103 MMOL/L
CO2 SERPL-SCNC: 25 MMOL/L
CREAT SERPL-MCNC: 0.83 MG/DL
EGFR: 100 ML/MIN/1.73M2
GLUCOSE SERPL-MCNC: 84 MG/DL
POTASSIUM SERPL-SCNC: 3.9 MMOL/L
PROT SERPL-MCNC: 7.1 G/DL
SODIUM SERPL-SCNC: 141 MMOL/L
T3 SERPL-MCNC: 96 NG/DL
T4 SERPL-MCNC: 7.7 UG/DL
TSH SERPL-ACNC: 1.06 UIU/ML

## 2023-08-17 NOTE — ASSESSMENT
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 S/P surgery to remove a neurofibroma from the scalp. S/P resection of intramedullary pilocytic astrocytoma 2020 after that ambulated with AFO and a quad cane. S/P resection of left retroperitoneal mass 7/12/22 by Dr. 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 Seen by PM&R in the past but did not follow up. She tried Selumetinib in spring 2021 but within few day after that she developed uveitis. Off treatment and improvement of vision. Follows closely with ophtho. On exam NF stigmata and right leg weakness  > I reviewed with JAYLIN continued surveillance for potential NF1 complications in adulthood: I advised JAYLIN to notify us if any progressive pain, rapid tumor growth, or change in consistency, becoming hard. These are symptoms that can be suggestive of conversion of plexiform NF to MPNST. Continued imaging of deeper plexiform NF is indicated even if asymptomatic.  Women with NF1 are at increased risk of breast cancer and may present at an earlier age than the general population. The National Comprehensive Cancer Network guidelines recommend an annual mammogram starting at age 30 years and suggest breast MRI w/ gado between 30 and 50 years.  B.P should be measured at least annually due to risk for julia vascular hypertension.  I reviewed with JAYLIN risk of genetic transmission of NF1 to offsprings, this is an autosomal dominant condition with 50% chance of transmission in each pregnancy. I advised her about option for prenatal or preimplantation diagnosis. Recommend consultation with genetics and OBGYN Risk of osteoporosis  > Nutritional counseling and suggestions on lifestyle modifications for BMI < 19 discussed. Refer to nutrition for further recommendations and safe and effective implementation of dietary intervention. Dietary goals will be discussed by Francine Tolentino, Registered Dietitian and myself in subsequent visits.

## 2023-08-17 NOTE — REASON FOR VISIT
[Follow-Up Evaluation] : a follow-up evaluation for [Other: ____] : [unfilled] [FreeTextEntry2] : MEGAN W/ Dr. Cerda

## 2023-08-17 NOTE — DATA REVIEWED
[FreeTextEntry1] : ACC: [id number]     EXAM:  MR SPINE LUMBAR WAW IC   ORDERED BY: [facility] PROCEDURE DATE:  07/12/2023 IMPRESSION: Status post resection of large dominant left para-aortic neurofibroma compared with the 2022 spine MRI study. Multiple paraspinal, retroperitoneal, subcutaneous, neural foraminal, and abdominal wall neurofibromas are again noted, stable in size compared to the recent abdomen MRI from 06/26/2023. Some of the lesions however are mildly larger in size compared to the older spine MRI from 02/03/2022.  ACC: [id number]     EXAM:  MR ABDOMEN WAW IC   ORDERED BY: [facility] ACC: [id number]     EXAM:  MR PELVIS WAW IC   ORDERED BY: [facility] PROCEDURE DATE:  06/26/2023 Numerous retroperitoneal, paraspinal, and cutaneous neurofibromas are again seen. Some lesions demonstrate a mild interval increase in size

## 2023-08-17 NOTE — PHYSICAL EXAM
[Well-appearing] : well-appearing [Soft] : soft [No organomegaly] : no organomegaly [Alert] : alert [Well related, good eye contact] : well related, good eye contact [Conversant] : conversant [Full extraocular movements] : full extraocular movements [No nystagmus] : no nystagmus [No facial asymmetry or weakness] : no facial asymmetry or weakness [Equal palate elevation] : equal palate elevation [Good shoulder shrug] : good shoulder shrug [Normal tongue movement] : normal tongue movement [Normal finger tapping and fine finger movements] : normal finger tapping and fine finger movements [2+ biceps] : 2+ biceps [No ankle clonus] : no ankle clonus [L] : left [R] : right [No dysmetria on FTNT] : no dysmetria on FTNT [Negative Romberg] : negative Romberg [de-identified] : awake, alert, in NAD [de-identified] : throat clear [de-identified] : several surgical scars, healed [de-identified] : JEANNE macules; cutaneous neurofibromas [de-identified] : right foot drop, brace right foot and right knee [de-identified] : right leg proximal and distal weakness [de-identified] : difficulty to heel walk right leg [de-identified] : brisk patella right [de-identified] : unsteady gait

## 2023-08-17 NOTE — CONSULT LETTER
[Dear  ___] : Dear  [unfilled], [Consult Letter:] : I had the pleasure of evaluating your patient, [unfilled]. [Please see my note below.] : Please see my note below. [Consult Closing:] : Thank you very much for allowing me to participate in the care of this patient.  If you have any questions, please do not hesitate to contact me. [Sincerely,] : Sincerely, [FreeTextEntry3] : Ina Guillen M.D Pediatric neurology attending Neurofibromatosis clinic Co-director [facility] Tel: [phone] Fax: [phone]

## 2023-08-17 NOTE — HISTORY OF PRESENT ILLNESS
[FreeTextEntry1] : JAYLIN has NF1. 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 S/P surgery to remove a neurofibroma from the scalp. 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 Seen by Dr. Prado, PM&R, Dec 2021, AFO advised to prevent falling & PT. MRI in Jan 2022 showed left retroperitoneal mass growth; S/P resection 7/12/22 by Dr. 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  Previously seen by Dr. Beck, Derm (2019); Dr. Garcia nephro (2017), Dr. 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 Griffith NSx for intramedullary tumors at T10-T11  She was on Selumetinib in 2021 but within few days she developed side effects; Seen by Dr. Packer 9/20/2021: anterior uveitis bilaterally; seen by Dr. Goldberg (uvea and retina specialist) 10/11/2021: Unspecified Iridocyclitis OU recent active following rx w NF1 drug/Selumetinib Tx with Selumetinib was D/Cd Oct 2021.  Last visit 12/13/2022 JAYLIN did not follow up with PM&R. Her right knee remains an issue.  She is using soft brace for right knee and right ankle. She buckled and fell   ___________________  08/15/2023  follow up Abdomen and Pelvis MRI 6/26/23 Numerous retroperitoneal, paraspinal, and cutaneous neurofibromas are again seen. Some increased L spine MRI 7/12/23 Multiple paraspinal, retroperitoneal, subcutaneous, neural foraminal, and abdominal wall neurofibromas Above reviewed with mother and JAYLIN MOSQUEDA reports that she is pain free. She denies headaches, stomachaches, or back pain. She denies any noticeable change in any of the NF, no change in size or consistency and she denies any pain associated with the NF. She has a NF dorsum of foot and inner ankle that somewhat bother her when she puts shoes on.  She works in retail factory part time. JAYLIN denies falls; right knee continues to give out; she continues to use the soft braces for right knee and right ankle. She did not follow up with PM&R JAYLIN states she is trying to gain weight. She is doing anything she can but she is not successful. She is interested in seeing a nutritionist.

## 2023-08-23 ENCOUNTER — APPOINTMENT (OUTPATIENT)
Dept: PEDIATRIC NEUROLOGY | Facility: CLINIC | Age: 23
End: 2023-08-23
Payer: COMMERCIAL

## 2023-08-23 PROCEDURE — 99211 OFF/OP EST MAY X REQ PHY/QHP: CPT | Mod: 95

## 2023-08-29 ENCOUNTER — APPOINTMENT (OUTPATIENT)
Dept: PLASTIC SURGERY | Facility: CLINIC | Age: 23
End: 2023-08-29
Payer: COMMERCIAL

## 2023-08-29 PROCEDURE — 99213 OFFICE O/P EST LOW 20 MIN: CPT

## 2023-08-29 NOTE — HISTORY OF PRESENT ILLNESS
[FreeTextEntry1] : Cate is a 22-year-old female with Neurofibromatosis type I and optic glioma who underwent right adrenalectomy followed by right nephrectomy for a peripheral nerve sheath tumor of the right adrenal gland. She has also undergone resection of a intradural tumor in 2020, with further resection in 2021 for persistent growth. She has attempted treatment with Selumetinib that was complicated by uveitis, so this was since discontinued and her vision has improved. She has been followed for a left retroperitoneal mass. Today presents in the office for a possible removal of two masses on left lower leg and foot that has been growing gradually and now become painful. h/o neurofibroma removal

## 2023-09-13 ENCOUNTER — APPOINTMENT (OUTPATIENT)
Dept: PHYSICAL MEDICINE AND REHAB | Facility: CLINIC | Age: 23
End: 2023-09-13
Payer: COMMERCIAL

## 2023-09-13 DIAGNOSIS — R26.9 UNSPECIFIED ABNORMALITIES OF GAIT AND MOBILITY: ICD-10-CM

## 2023-09-13 DIAGNOSIS — S24.153A: ICD-10-CM

## 2023-09-13 PROCEDURE — 99213 OFFICE O/P EST LOW 20 MIN: CPT

## 2023-09-26 ENCOUNTER — APPOINTMENT (OUTPATIENT)
Dept: PLASTIC SURGERY | Facility: CLINIC | Age: 23
End: 2023-09-26
Payer: COMMERCIAL

## 2023-09-26 ENCOUNTER — LABORATORY RESULT (OUTPATIENT)
Age: 23
End: 2023-09-26

## 2023-09-26 PROCEDURE — 13120 CMPLX RPR S/A/L 1.1-2.5 CM: CPT | Mod: 58,59

## 2023-09-26 PROCEDURE — 27618 EXC LEG/ANKLE TUM < 3 CM: CPT

## 2023-10-03 ENCOUNTER — APPOINTMENT (OUTPATIENT)
Dept: PLASTIC SURGERY | Facility: CLINIC | Age: 23
End: 2023-10-03
Payer: COMMERCIAL

## 2023-10-03 PROCEDURE — 99024 POSTOP FOLLOW-UP VISIT: CPT

## 2023-10-04 ENCOUNTER — APPOINTMENT (OUTPATIENT)
Dept: OPHTHALMOLOGY | Facility: CLINIC | Age: 23
End: 2023-10-04
Payer: COMMERCIAL

## 2023-10-04 ENCOUNTER — NON-APPOINTMENT (OUTPATIENT)
Age: 23
End: 2023-10-04

## 2023-10-04 PROCEDURE — 92250 FUNDUS PHOTOGRAPHY W/I&R: CPT

## 2023-10-04 PROCEDURE — 92014 COMPRE OPH EXAM EST PT 1/>: CPT

## 2023-10-10 ENCOUNTER — LABORATORY RESULT (OUTPATIENT)
Age: 23
End: 2023-10-10

## 2023-10-10 ENCOUNTER — APPOINTMENT (OUTPATIENT)
Dept: PLASTIC SURGERY | Facility: CLINIC | Age: 23
End: 2023-10-10
Payer: COMMERCIAL

## 2023-10-10 ENCOUNTER — APPOINTMENT (OUTPATIENT)
Dept: OBGYN | Facility: CLINIC | Age: 23
End: 2023-10-10
Payer: COMMERCIAL

## 2023-10-10 VITALS
BODY MASS INDEX: 18.5 KG/M2 | DIASTOLIC BLOOD PRESSURE: 62 MMHG | SYSTOLIC BLOOD PRESSURE: 98 MMHG | HEART RATE: 67 BPM | WEIGHT: 98 LBS | HEIGHT: 61 IN

## 2023-10-10 DIAGNOSIS — Z01.419 ENCOUNTER FOR GYNECOLOGICAL EXAMINATION (GENERAL) (ROUTINE) W/OUT ABNORMAL FINDINGS: ICD-10-CM

## 2023-10-10 DIAGNOSIS — Q85.01 NEUROFIBROMATOSIS, TYPE 1: ICD-10-CM

## 2023-10-10 PROCEDURE — 28039 EXC FOOT/TOE TUM SC 1.5 CM/>: CPT | Mod: 79

## 2023-10-10 PROCEDURE — 99385 PREV VISIT NEW AGE 18-39: CPT

## 2023-10-10 PROCEDURE — 13131 CMPLX RPR F/C/C/M/N/AX/G/H/F: CPT | Mod: 79,59

## 2023-10-13 LAB — CYTOLOGY CVX/VAG DOC THIN PREP: ABNORMAL

## 2023-10-17 ENCOUNTER — APPOINTMENT (OUTPATIENT)
Dept: PLASTIC SURGERY | Facility: CLINIC | Age: 23
End: 2023-10-17
Payer: COMMERCIAL

## 2023-10-17 PROCEDURE — 99024 POSTOP FOLLOW-UP VISIT: CPT

## 2023-11-09 ENCOUNTER — APPOINTMENT (OUTPATIENT)
Dept: PLASTIC SURGERY | Facility: CLINIC | Age: 23
End: 2023-11-09
Payer: COMMERCIAL

## 2023-11-09 DIAGNOSIS — D36.10 BENIGN NEOPLASM OF PERIPHERAL NERVES AND AUTONOMIC NERVOUS SYSTEM, UNSPECIFIED: ICD-10-CM

## 2023-11-09 DIAGNOSIS — L03.818 CELLULITIS OF OTHER SITES: ICD-10-CM

## 2023-11-09 PROCEDURE — 99024 POSTOP FOLLOW-UP VISIT: CPT

## 2023-11-09 RX ORDER — MUPIROCIN 20 MG/G
2 OINTMENT TOPICAL 3 TIMES DAILY
Qty: 1 | Refills: 1 | Status: ACTIVE | COMMUNITY
Start: 2023-11-09 | End: 1900-01-01

## 2023-11-09 RX ORDER — CEPHALEXIN 500 MG/1
500 CAPSULE ORAL
Qty: 10 | Refills: 0 | Status: ACTIVE | COMMUNITY
Start: 2023-11-09 | End: 1900-01-01

## 2023-11-13 PROBLEM — D36.10 NEUROFIBROMA: Status: ACTIVE | Noted: 2017-07-18

## 2023-11-22 ENCOUNTER — APPOINTMENT (OUTPATIENT)
Dept: PEDIATRIC NEUROLOGY | Facility: CLINIC | Age: 23
End: 2023-11-22

## 2024-02-01 NOTE — H&P PST ADULT - NSANTHGENDERRD_ENT_A_CORE
Render In Strict Bullet Format?: No
Initiate Treatment: pharmacy compounding accessory \\nQuantity: 1.0 Unspecified  Days Supply: 30\\nSig: SK Pen Cream AAA QHS as tolerated *not on healthy skin*
Detail Level: Zone
No

## 2024-02-28 NOTE — ASU PATIENT PROFILE, ADULT - AS SC BRADEN MOISTURE
Continued Stay Note  [facility]     Patient Name: Mona Sheffield  MRN: [id number]  Today's Date: 2/28/2024    Admit Date: 2/26/2024    Plan: Return home with brother.   Discharge Plan       Row Name 02/28/24 1050       Plan    Plan Return home with brother.    Plan Comments Barriers:  Urology following with f/c in place and renal ultrasound ordered. Clear Liquid diet and GI consult for constipation.  PT recommended home health and case management will follow up prior to discharge                        Phone communication or documentation only - no physical contact with patient or family.   Brokoe MENDOZA,RN Case Manager  [facility]  Phone: Desk- [phone] cell- [phone]    (4) rarely moist

## 2024-04-01 NOTE — ASU PREOP CHECKLIST - BOWEL PREP
Submitted PA for Lubiprostone 8MCG capsules   Via [facility] Key: [id number]  STATUS: PENDING.    Follow up done daily; if no decision with in three days we will refax.  If another three days goes by with no decision will call the insurance for status.     n/a

## 2024-04-17 NOTE — ASU PATIENT PROFILE, ADULT - ANESTHESIA, PREVIOUS REACTION, PROFILE
----- Message from Angel Dacosta MD sent at 4/16/2024  4:09 PM EDT -----  Rare premature beats on the monitor nothing concerning.  ----- Message -----  From: Roverto Sanchez MD  Sent: 4/13/2024   7:34 PM EDT  To: Angel Dacosta MD       none

## 2024-09-11 NOTE — DIETITIAN INITIAL EVALUATION PEDIATRIC - PROTEIN CALORIES
Type of surgery: RFID localized right breast lumpectomy with right sentinel lymph node biopsy  Location of surgery: [facility]  Date and time of surgery: 9/23/24 at 10:40am  Surgeon: Dr. Geoff Hurst  Pre-Op Appt Date: patient to schedule  Post-Op Appt Date: patient to schedule   Packet sent out: Yes  Pre-cert/Authorization completed:  Not Applicable  Date: 9/11/24     69.9 93.2

## 2024-10-01 NOTE — H&P PST ADULT - NSANTHOSAYNRD_GEN_A_CORE
bilateral upper extremity ROM was WFL (within functional limits)/bilateral lower extremity ROM was WFL (within functional limits)
No. GENIA screening performed.  STOP BANG Legend: 0-2 = LOW Risk; 3-4 = INTERMEDIATE Risk; 5-8 = HIGH Risk

## 2025-03-18 NOTE — PHYSICAL THERAPY INITIAL EVALUATION ADULT - HEALTH SCREEN CRITERIA
You no longer need to see a breast surgeon. Please follow-up in the breast clinic with an CHEN/NP  for yearly exam    
yes

## 2025-05-08 NOTE — H&P PST ADULT - CORNEAL ABRASION RISK
Patient's first and last name, , procedure, and correct site confirmed prior to the start of procedure. pt denies